# Patient Record
Sex: FEMALE | Race: WHITE | Employment: OTHER | URBAN - METROPOLITAN AREA
[De-identification: names, ages, dates, MRNs, and addresses within clinical notes are randomized per-mention and may not be internally consistent; named-entity substitution may affect disease eponyms.]

---

## 2017-07-17 PROBLEM — M16.4 POST-TRAUMATIC OSTEOARTHRITIS OF BOTH HIPS: Status: ACTIVE | Noted: 2017-07-17

## 2017-07-17 PROBLEM — M51.36 DDD (DEGENERATIVE DISC DISEASE), LUMBAR: Status: ACTIVE | Noted: 2017-07-17

## 2017-08-25 PROBLEM — Z87.81 HISTORY OF COMPRESSION FRACTURE OF SPINE: Status: ACTIVE | Noted: 2017-08-25

## 2018-05-14 ENCOUNTER — HOSPITAL ENCOUNTER (OUTPATIENT)
Age: 83
Discharge: HOME | End: 2018-05-16
Payer: MEDICARE

## 2018-05-14 LAB
ALBUMIN SERPL-MCNC: 3.7 G/DL (ref 3.5–5.2)
ALP BLD-CCNC: 50 U/L (ref 35–104)
ALT SERPL-CCNC: 13 U/L (ref 0–32)
ANION GAP SERPL CALCULATED.3IONS-SCNC: 15 MMOL/L (ref 7–16)
AST SERPL-CCNC: 18 U/L (ref 0–31)
BASOPHILS ABSOLUTE: 0.04 E9/L (ref 0–0.2)
BASOPHILS RELATIVE PERCENT: 0.8 % (ref 0–2)
BILIRUB SERPL-MCNC: 0.3 MG/DL (ref 0–1.2)
BUN BLDV-MCNC: 18 MG/DL (ref 8–23)
C-REACTIVE PROTEIN: <0.1 MG/DL (ref 0–0.4)
CALCIUM SERPL-MCNC: 9 MG/DL (ref 8.6–10.2)
CHLORIDE BLD-SCNC: 102 MMOL/L (ref 98–107)
CHOLESTEROL, TOTAL: 147 MG/DL (ref 0–199)
CO2: 25 MMOL/L (ref 22–29)
CREAT SERPL-MCNC: 1 MG/DL (ref 0.5–1)
EOSINOPHILS ABSOLUTE: 0.12 E9/L (ref 0.05–0.5)
EOSINOPHILS RELATIVE PERCENT: 2.3 % (ref 0–6)
GFR AFRICAN AMERICAN: >60
GFR NON-AFRICAN AMERICAN: 53 ML/MIN/1.73
GLUCOSE BLD-MCNC: 122 MG/DL (ref 74–109)
HCT VFR BLD CALC: 41.8 % (ref 34–48)
HDLC SERPL-MCNC: 72 MG/DL
HEMOGLOBIN: 13.2 G/DL (ref 11.5–15.5)
IMMATURE GRANULOCYTES #: 0.03 E9/L
IMMATURE GRANULOCYTES %: 0.6 % (ref 0–5)
LDL CHOLESTEROL CALCULATED: 57 MG/DL (ref 0–99)
LYMPHOCYTES ABSOLUTE: 1.12 E9/L (ref 1.5–4)
LYMPHOCYTES RELATIVE PERCENT: 21.3 % (ref 20–42)
MCH RBC QN AUTO: 29.3 PG (ref 26–35)
MCHC RBC AUTO-ENTMCNC: 31.6 % (ref 32–34.5)
MCV RBC AUTO: 92.9 FL (ref 80–99.9)
MONOCYTES ABSOLUTE: 0.57 E9/L (ref 0.1–0.95)
MONOCYTES RELATIVE PERCENT: 10.8 % (ref 2–12)
NEUTROPHILS ABSOLUTE: 3.39 E9/L (ref 1.8–7.3)
NEUTROPHILS RELATIVE PERCENT: 64.2 % (ref 43–80)
PDW BLD-RTO: 12.8 FL (ref 11.5–15)
PLATELET # BLD: 194 E9/L (ref 130–450)
PMV BLD AUTO: 11.4 FL (ref 7–12)
POTASSIUM SERPL-SCNC: 4.6 MMOL/L (ref 3.5–5)
RBC # BLD: 4.5 E12/L (ref 3.5–5.5)
SODIUM BLD-SCNC: 142 MMOL/L (ref 132–146)
TOTAL PROTEIN: 6.5 G/DL (ref 6.4–8.3)
TRIGL SERPL-MCNC: 89 MG/DL (ref 0–149)
VLDLC SERPL CALC-MCNC: 18 MG/DL
WBC # BLD: 5.3 E9/L (ref 4.5–11.5)

## 2018-05-14 PROCEDURE — 80053 COMPREHEN METABOLIC PANEL: CPT

## 2018-05-14 PROCEDURE — 86140 C-REACTIVE PROTEIN: CPT

## 2018-05-14 PROCEDURE — 85651 RBC SED RATE NONAUTOMATED: CPT

## 2018-05-14 PROCEDURE — 80061 LIPID PANEL: CPT

## 2018-05-14 PROCEDURE — 85025 COMPLETE CBC W/AUTO DIFF WBC: CPT

## 2018-05-15 LAB — SEDIMENTATION RATE, ERYTHROCYTE: 6 MM/HR (ref 0–20)

## 2018-11-21 ENCOUNTER — HOSPITAL ENCOUNTER (OUTPATIENT)
Age: 83
Discharge: HOME | End: 2018-11-23
Payer: MEDICARE

## 2018-11-21 LAB
ALBUMIN SERPL-MCNC: 3.8 G/DL (ref 3.5–5.2)
ALP BLD-CCNC: 48 U/L (ref 35–104)
ALT SERPL-CCNC: 17 U/L (ref 0–32)
ANION GAP SERPL CALCULATED.3IONS-SCNC: 15 MMOL/L (ref 7–16)
AST SERPL-CCNC: 24 U/L (ref 0–31)
BASOPHILS ABSOLUTE: 0.06 E9/L (ref 0–0.2)
BASOPHILS RELATIVE PERCENT: 1.1 % (ref 0–2)
BILIRUB SERPL-MCNC: 0.4 MG/DL (ref 0–1.2)
BUN BLDV-MCNC: 17 MG/DL (ref 8–23)
CALCIUM SERPL-MCNC: 9.1 MG/DL (ref 8.6–10.2)
CHLORIDE BLD-SCNC: 102 MMOL/L (ref 98–107)
CO2: 24 MMOL/L (ref 22–29)
CREAT SERPL-MCNC: 0.8 MG/DL (ref 0.5–1)
EOSINOPHILS ABSOLUTE: 0.11 E9/L (ref 0.05–0.5)
EOSINOPHILS RELATIVE PERCENT: 2 % (ref 0–6)
FOLATE: >20 NG/ML (ref 4.8–24.2)
GFR AFRICAN AMERICAN: >60
GFR NON-AFRICAN AMERICAN: >60 ML/MIN/1.73
GLUCOSE BLD-MCNC: 81 MG/DL (ref 74–99)
HCT VFR BLD CALC: 40.8 % (ref 34–48)
HEMOGLOBIN: 12.8 G/DL (ref 11.5–15.5)
IMMATURE GRANULOCYTES #: 0.02 E9/L
IMMATURE GRANULOCYTES %: 0.4 % (ref 0–5)
LYMPHOCYTES ABSOLUTE: 0.85 E9/L (ref 1.5–4)
LYMPHOCYTES RELATIVE PERCENT: 15.6 % (ref 20–42)
MCH RBC QN AUTO: 29.3 PG (ref 26–35)
MCHC RBC AUTO-ENTMCNC: 31.4 % (ref 32–34.5)
MCV RBC AUTO: 93.4 FL (ref 80–99.9)
MONOCYTES ABSOLUTE: 0.73 E9/L (ref 0.1–0.95)
MONOCYTES RELATIVE PERCENT: 13.4 % (ref 2–12)
NEUTROPHILS ABSOLUTE: 3.67 E9/L (ref 1.8–7.3)
NEUTROPHILS RELATIVE PERCENT: 67.5 % (ref 43–80)
PDW BLD-RTO: 13.2 FL (ref 11.5–15)
PLATELET # BLD: 184 E9/L (ref 130–450)
PMV BLD AUTO: 11.4 FL (ref 7–12)
POTASSIUM REFLEX MAGNESIUM: 4.3 MMOL/L (ref 3.5–5)
RBC # BLD: 4.37 E12/L (ref 3.5–5.5)
SODIUM BLD-SCNC: 141 MMOL/L (ref 132–146)
T4 FREE: 1.08 NG/DL (ref 0.93–1.7)
TOTAL PROTEIN: 6.5 G/DL (ref 6.4–8.3)
TSH SERPL DL<=0.05 MIU/L-ACNC: 1.92 UIU/ML (ref 0.27–4.2)
VITAMIN B-12: 702 PG/ML (ref 211–946)
VITAMIN D 25-HYDROXY: 55 NG/ML (ref 30–100)
WBC # BLD: 5.4 E9/L (ref 4.5–11.5)

## 2018-11-21 PROCEDURE — 82746 ASSAY OF FOLIC ACID SERUM: CPT

## 2018-11-21 PROCEDURE — 82306 VITAMIN D 25 HYDROXY: CPT

## 2018-11-21 PROCEDURE — 84439 ASSAY OF FREE THYROXINE: CPT

## 2018-11-21 PROCEDURE — 85025 COMPLETE CBC W/AUTO DIFF WBC: CPT

## 2018-11-21 PROCEDURE — 84443 ASSAY THYROID STIM HORMONE: CPT

## 2018-11-21 PROCEDURE — 82607 VITAMIN B-12: CPT

## 2018-11-21 PROCEDURE — 80053 COMPREHEN METABOLIC PANEL: CPT

## 2019-02-19 ENCOUNTER — HOSPITAL ENCOUNTER (OUTPATIENT)
Age: 84
Discharge: HOME | End: 2019-02-21
Payer: MEDICARE

## 2019-02-19 LAB
ALBUMIN SERPL-MCNC: 4 G/DL (ref 3.5–5.2)
ALP BLD-CCNC: 55 U/L (ref 35–104)
ALT SERPL-CCNC: 16 U/L (ref 0–32)
ANION GAP SERPL CALCULATED.3IONS-SCNC: 10 MMOL/L (ref 7–16)
AST SERPL-CCNC: 22 U/L (ref 0–31)
BASOPHILS ABSOLUTE: 0.04 E9/L (ref 0–0.2)
BASOPHILS RELATIVE PERCENT: 0.6 % (ref 0–2)
BILIRUB SERPL-MCNC: 0.3 MG/DL (ref 0–1.2)
BUN BLDV-MCNC: 18 MG/DL (ref 8–23)
C-REACTIVE PROTEIN: <0.1 MG/DL (ref 0–0.4)
CALCIUM SERPL-MCNC: 9.1 MG/DL (ref 8.6–10.2)
CHLORIDE BLD-SCNC: 100 MMOL/L (ref 98–107)
CHOLESTEROL, TOTAL: 172 MG/DL (ref 0–199)
CO2: 30 MMOL/L (ref 22–29)
CREAT SERPL-MCNC: 0.9 MG/DL (ref 0.5–1)
CREATININE URINE: 82 MG/DL (ref 29–226)
EOSINOPHILS ABSOLUTE: 0.06 E9/L (ref 0.05–0.5)
EOSINOPHILS RELATIVE PERCENT: 1 % (ref 0–6)
GFR AFRICAN AMERICAN: >60
GFR NON-AFRICAN AMERICAN: 59 ML/MIN/1.73
GLUCOSE BLD-MCNC: 83 MG/DL (ref 74–99)
HCT VFR BLD CALC: 42.5 % (ref 34–48)
HCT VFR BLD CALC: 42.7 % (ref 34–48)
HDLC SERPL-MCNC: 89 MG/DL
HEMOGLOBIN: 13 G/DL (ref 11.5–15.5)
IMMATURE GRANULOCYTES #: 0.06 E9/L
IMMATURE GRANULOCYTES %: 1 % (ref 0–5)
IMMATURE RETIC FRACT: 7.4 % (ref 3–15.9)
IRON SATURATION: 22 % (ref 15–50)
IRON: 74 MCG/DL (ref 37–145)
LDL CHOLESTEROL CALCULATED: 68 MG/DL (ref 0–99)
LYMPHOCYTES ABSOLUTE: 0.86 E9/L (ref 1.5–4)
LYMPHOCYTES RELATIVE PERCENT: 13.8 % (ref 20–42)
MAGNESIUM: 2.2 MG/DL (ref 1.6–2.6)
MCH RBC QN AUTO: 29.2 PG (ref 26–35)
MCHC RBC AUTO-ENTMCNC: 30.4 % (ref 32–34.5)
MCV RBC AUTO: 96 FL (ref 80–99.9)
MICROALBUMIN UR-MCNC: <12 MG/L
MICROALBUMIN/CREAT UR-RTO: ABNORMAL (ref 0–30)
MONOCYTES ABSOLUTE: 0.7 E9/L (ref 0.1–0.95)
MONOCYTES RELATIVE PERCENT: 11.2 % (ref 2–12)
NEUTROPHILS ABSOLUTE: 4.51 E9/L (ref 1.8–7.3)
NEUTROPHILS RELATIVE PERCENT: 72.4 % (ref 43–80)
PARATHYROID HORMONE INTACT: 29 PG/ML (ref 15–65)
PDW BLD-RTO: 13.7 FL (ref 11.5–15)
PHOSPHORUS: 4.4 MG/DL (ref 2.5–4.5)
PLATELET # BLD: 213 E9/L (ref 130–450)
PMV BLD AUTO: 10.8 FL (ref 7–12)
POTASSIUM SERPL-SCNC: 4.6 MMOL/L (ref 3.5–5)
RBC # BLD: 4.45 E12/L (ref 3.5–5.5)
RETIC HGB EQUIVALENT: 33.8 PG (ref 28.2–36.6)
RETICULOCYTE ABSOLUTE COUNT: 0.07 E12/L
RETICULOCYTE COUNT PCT: 1.5 % (ref 0.4–1.9)
SODIUM BLD-SCNC: 140 MMOL/L (ref 132–146)
TOTAL IRON BINDING CAPACITY: 331 MCG/DL (ref 250–450)
TOTAL PROTEIN: 6.6 G/DL (ref 6.4–8.3)
TRIGL SERPL-MCNC: 77 MG/DL (ref 0–149)
TSH SERPL DL<=0.05 MIU/L-ACNC: 2.06 UIU/ML (ref 0.27–4.2)
URIC ACID, SERUM: 3.5 MG/DL (ref 2.4–5.7)
VITAMIN D 25-HYDROXY: 57 NG/ML (ref 30–100)
VLDLC SERPL CALC-MCNC: 15 MG/DL
WBC # BLD: 6.2 E9/L (ref 4.5–11.5)

## 2019-02-19 PROCEDURE — 82570 ASSAY OF URINE CREATININE: CPT

## 2019-02-19 PROCEDURE — 83550 IRON BINDING TEST: CPT

## 2019-02-19 PROCEDURE — 82044 UR ALBUMIN SEMIQUANTITATIVE: CPT

## 2019-02-19 PROCEDURE — 85651 RBC SED RATE NONAUTOMATED: CPT

## 2019-02-19 PROCEDURE — 83540 ASSAY OF IRON: CPT

## 2019-02-19 PROCEDURE — 84443 ASSAY THYROID STIM HORMONE: CPT

## 2019-02-19 PROCEDURE — 84100 ASSAY OF PHOSPHORUS: CPT

## 2019-02-19 PROCEDURE — 82306 VITAMIN D 25 HYDROXY: CPT

## 2019-02-19 PROCEDURE — 80053 COMPREHEN METABOLIC PANEL: CPT

## 2019-02-19 PROCEDURE — 80061 LIPID PANEL: CPT

## 2019-02-19 PROCEDURE — 83735 ASSAY OF MAGNESIUM: CPT

## 2019-02-19 PROCEDURE — 84550 ASSAY OF BLOOD/URIC ACID: CPT

## 2019-02-19 PROCEDURE — 86140 C-REACTIVE PROTEIN: CPT

## 2019-02-19 PROCEDURE — 85045 AUTOMATED RETICULOCYTE COUNT: CPT

## 2019-02-19 PROCEDURE — 83970 ASSAY OF PARATHORMONE: CPT

## 2019-02-19 PROCEDURE — 85025 COMPLETE CBC W/AUTO DIFF WBC: CPT

## 2019-02-20 LAB — SEDIMENTATION RATE, ERYTHROCYTE: 6 MM/HR (ref 0–20)

## 2019-03-07 DIAGNOSIS — M85.80 DECREASED BONE DENSITY: ICD-10-CM

## 2019-03-07 RX ORDER — ZOLEDRONIC ACID 5 MG/100ML
5 INJECTION, SOLUTION INTRAVENOUS ONCE
Status: CANCELLED | OUTPATIENT
Start: 2019-04-15 | End: 2019-04-15

## 2019-03-07 RX ORDER — HEPARIN SODIUM (PORCINE) LOCK FLUSH IV SOLN 100 UNIT/ML 100 UNIT/ML
500 SOLUTION INTRAVENOUS PRN
Status: CANCELLED | OUTPATIENT
Start: 2019-04-15

## 2019-03-07 RX ORDER — SODIUM CHLORIDE 0.9 % (FLUSH) 0.9 %
10 SYRINGE (ML) INJECTION PRN
Status: CANCELLED | OUTPATIENT
Start: 2019-04-15

## 2019-03-26 ENCOUNTER — HOSPITAL ENCOUNTER (OUTPATIENT)
Dept: INFUSION THERAPY | Age: 84
Setting detail: INFUSION SERIES
Discharge: HOME | End: 2019-03-26
Payer: MEDICARE

## 2019-03-26 VITALS
RESPIRATION RATE: 18 BRPM | HEART RATE: 75 BPM | DIASTOLIC BLOOD PRESSURE: 68 MMHG | OXYGEN SATURATION: 96 % | TEMPERATURE: 98 F | SYSTOLIC BLOOD PRESSURE: 133 MMHG

## 2019-03-26 DIAGNOSIS — M85.80 DECREASED BONE DENSITY: Primary | ICD-10-CM

## 2019-03-26 PROCEDURE — 96365 THER/PROPH/DIAG IV INF INIT: CPT

## 2019-03-26 PROCEDURE — 6360000002 HC RX W HCPCS: Performed by: INTERNAL MEDICINE

## 2019-03-26 PROCEDURE — 2580000003 HC RX 258: Performed by: INTERNAL MEDICINE

## 2019-03-26 RX ORDER — CAPSAICIN 0.025 %
CREAM (GRAM) TOPICAL 2 TIMES DAILY
COMMUNITY
End: 2021-01-01

## 2019-03-26 RX ORDER — ZOLEDRONIC ACID 5 MG/100ML
5 INJECTION, SOLUTION INTRAVENOUS ONCE
Status: CANCELLED | OUTPATIENT
Start: 2019-03-26 | End: 2019-03-26

## 2019-03-26 RX ORDER — IPRATROPIUM BROMIDE 21 UG/1
2 SPRAY, METERED NASAL EVERY 12 HOURS
COMMUNITY
End: 2021-01-01

## 2019-03-26 RX ORDER — SODIUM CHLORIDE 0.9 % (FLUSH) 0.9 %
10 SYRINGE (ML) INJECTION PRN
Status: CANCELLED | OUTPATIENT
Start: 2019-03-26

## 2019-03-26 RX ORDER — BENAZEPRIL HYDROCHLORIDE AND HYDROCHLOROTHIAZIDE 5; 6.25 MG/1; MG/1
1 TABLET ORAL DAILY
COMMUNITY
End: 2021-01-01

## 2019-03-26 RX ORDER — HEPARIN SODIUM (PORCINE) LOCK FLUSH IV SOLN 100 UNIT/ML 100 UNIT/ML
500 SOLUTION INTRAVENOUS PRN
Status: CANCELLED | OUTPATIENT
Start: 2019-03-26

## 2019-03-26 RX ORDER — SODIUM CHLORIDE 0.9 % (FLUSH) 0.9 %
10 SYRINGE (ML) INJECTION PRN
Status: DISCONTINUED | OUTPATIENT
Start: 2019-03-26 | End: 2019-03-27 | Stop reason: HOSPADM

## 2019-03-26 RX ORDER — ZOLEDRONIC ACID 5 MG/100ML
5 INJECTION, SOLUTION INTRAVENOUS ONCE
Status: COMPLETED | OUTPATIENT
Start: 2019-03-26 | End: 2019-03-26

## 2019-03-26 RX ORDER — BUDESONIDE AND FORMOTEROL FUMARATE DIHYDRATE 160; 4.5 UG/1; UG/1
2 AEROSOL RESPIRATORY (INHALATION) 2 TIMES DAILY
COMMUNITY
End: 2021-01-01

## 2019-03-26 RX ADMIN — Medication 10 ML: at 13:52

## 2019-03-26 RX ADMIN — ZOLEDRONIC ACID 5 MG: 0.05 INJECTION, SOLUTION INTRAVENOUS at 13:31

## 2019-03-26 RX ADMIN — Medication 10 ML: at 13:31

## 2019-05-14 ENCOUNTER — HOSPITAL ENCOUNTER (OUTPATIENT)
Age: 84
Discharge: HOME | End: 2019-05-16
Payer: MEDICARE

## 2019-05-14 LAB
ALBUMIN SERPL-MCNC: 4 G/DL (ref 3.5–5.2)
ALP BLD-CCNC: 47 U/L (ref 35–104)
ALT SERPL-CCNC: 14 U/L (ref 0–32)
ANION GAP SERPL CALCULATED.3IONS-SCNC: 15 MMOL/L (ref 7–16)
AST SERPL-CCNC: 25 U/L (ref 0–31)
BASOPHILS ABSOLUTE: 0 E9/L (ref 0–0.2)
BASOPHILS RELATIVE PERCENT: 0.6 % (ref 0–2)
BILIRUB SERPL-MCNC: 0.4 MG/DL (ref 0–1.2)
BUN BLDV-MCNC: 17 MG/DL (ref 8–23)
BURR CELLS: ABNORMAL
CALCIUM SERPL-MCNC: 9.3 MG/DL (ref 8.6–10.2)
CHLORIDE BLD-SCNC: 103 MMOL/L (ref 98–107)
CO2: 25 MMOL/L (ref 22–29)
CREAT SERPL-MCNC: 0.8 MG/DL (ref 0.5–1)
EOSINOPHILS ABSOLUTE: 0.11 E9/L (ref 0.05–0.5)
EOSINOPHILS RELATIVE PERCENT: 1.7 % (ref 0–6)
GFR AFRICAN AMERICAN: >60
GFR NON-AFRICAN AMERICAN: >60 ML/MIN/1.73
GLUCOSE BLD-MCNC: 103 MG/DL (ref 74–99)
HCT VFR BLD CALC: 40.5 % (ref 34–48)
HEMOGLOBIN: 12.8 G/DL (ref 11.5–15.5)
LYMPHOCYTES ABSOLUTE: 0.4 E9/L (ref 1.5–4)
LYMPHOCYTES RELATIVE PERCENT: 6.1 % (ref 20–42)
MCH RBC QN AUTO: 29.3 PG (ref 26–35)
MCHC RBC AUTO-ENTMCNC: 31.6 % (ref 32–34.5)
MCV RBC AUTO: 92.7 FL (ref 80–99.9)
MONOCYTES ABSOLUTE: 0.79 E9/L (ref 0.1–0.95)
MONOCYTES RELATIVE PERCENT: 12.2 % (ref 2–12)
NEUTROPHILS ABSOLUTE: 5.28 E9/L (ref 1.8–7.3)
NEUTROPHILS RELATIVE PERCENT: 80 % (ref 43–80)
OVALOCYTES: ABNORMAL
PDW BLD-RTO: 13.4 FL (ref 11.5–15)
PLATELET # BLD: 184 E9/L (ref 130–450)
PMV BLD AUTO: 11.7 FL (ref 7–12)
POIKILOCYTES: ABNORMAL
POTASSIUM SERPL-SCNC: 4.5 MMOL/L (ref 3.5–5)
RBC # BLD: 4.37 E12/L (ref 3.5–5.5)
SODIUM BLD-SCNC: 143 MMOL/L (ref 132–146)
TOTAL PROTEIN: 6.7 G/DL (ref 6.4–8.3)
WBC # BLD: 6.6 E9/L (ref 4.5–11.5)

## 2019-05-14 PROCEDURE — 80053 COMPREHEN METABOLIC PANEL: CPT

## 2019-05-14 PROCEDURE — 85025 COMPLETE CBC W/AUTO DIFF WBC: CPT

## 2019-08-13 ENCOUNTER — HOSPITAL ENCOUNTER (OUTPATIENT)
Dept: GENERAL RADIOLOGY | Age: 84
Discharge: HOME | End: 2019-08-15
Payer: MEDICARE

## 2019-08-13 ENCOUNTER — HOSPITAL ENCOUNTER (OUTPATIENT)
Age: 84
Discharge: HOME | End: 2019-08-15
Payer: MEDICARE
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
## 2019-08-13 PROCEDURE — 72100 X-RAY EXAM L-S SPINE 2/3 VWS: CPT

## 2019-08-13 PROCEDURE — 72220 X-RAY EXAM SACRUM TAILBONE: CPT

## 2020-01-01 ENCOUNTER — HOSPITAL ENCOUNTER (OUTPATIENT)
Age: 85
Discharge: HOME | End: 2020-09-10
Payer: MEDICARE

## 2020-01-01 ENCOUNTER — TELEPHONE (OUTPATIENT)
Dept: FAMILY MEDICINE CLINIC | Age: 85
End: 2020-01-01

## 2020-01-01 LAB
ALBUMIN SERPL-MCNC: 3.6 G/DL (ref 3.5–5.2)
ALBUMIN SERPL-MCNC: 3.8 G/DL (ref 3.5–5.2)
ALP BLD-CCNC: 49 U/L (ref 35–104)
ALP BLD-CCNC: 51 U/L (ref 35–104)
ALT SERPL-CCNC: 12 U/L (ref 0–32)
ALT SERPL-CCNC: 13 U/L (ref 0–32)
ANION GAP SERPL CALCULATED.3IONS-SCNC: 11 MMOL/L (ref 7–16)
ANION GAP SERPL CALCULATED.3IONS-SCNC: 13 MMOL/L (ref 7–16)
AST SERPL-CCNC: 21 U/L (ref 0–31)
AST SERPL-CCNC: 22 U/L (ref 0–31)
BASOPHILS ABSOLUTE: 0.05 E9/L (ref 0–0.2)
BASOPHILS ABSOLUTE: 0.07 E9/L (ref 0–0.2)
BASOPHILS RELATIVE PERCENT: 0.7 % (ref 0–2)
BASOPHILS RELATIVE PERCENT: 1.2 % (ref 0–2)
BILIRUB SERPL-MCNC: 0.3 MG/DL (ref 0–1.2)
BILIRUB SERPL-MCNC: 0.5 MG/DL (ref 0–1.2)
BILIRUBIN URINE: NEGATIVE
BLOOD, URINE: NEGATIVE
BUN BLDV-MCNC: 19 MG/DL (ref 8–23)
BUN BLDV-MCNC: 19 MG/DL (ref 8–23)
C-REACTIVE PROTEIN: <0.1 MG/DL (ref 0–0.4)
CALCIUM SERPL-MCNC: 9.4 MG/DL (ref 8.6–10.2)
CALCIUM SERPL-MCNC: 9.4 MG/DL (ref 8.6–10.2)
CHLORIDE BLD-SCNC: 103 MMOL/L (ref 98–107)
CHLORIDE BLD-SCNC: 98 MMOL/L (ref 98–107)
CHOLESTEROL, TOTAL: 146 MG/DL (ref 0–199)
CHOLESTEROL, TOTAL: 153 MG/DL (ref 0–199)
CLARITY: CLEAR
CO2: 26 MMOL/L (ref 22–29)
CO2: 28 MMOL/L (ref 22–29)
COLOR: YELLOW
CREAT SERPL-MCNC: 0.8 MG/DL (ref 0.5–1)
CREAT SERPL-MCNC: 1 MG/DL (ref 0.5–1)
EOSINOPHILS ABSOLUTE: 0.1 E9/L (ref 0.05–0.5)
EOSINOPHILS ABSOLUTE: 0.22 E9/L (ref 0.05–0.5)
EOSINOPHILS RELATIVE PERCENT: 1.4 % (ref 0–6)
EOSINOPHILS RELATIVE PERCENT: 3.8 % (ref 0–6)
FOLATE: >20 NG/ML (ref 4.8–24.2)
GFR AFRICAN AMERICAN: >60
GFR AFRICAN AMERICAN: >60
GFR NON-AFRICAN AMERICAN: 52 ML/MIN/1.73
GFR NON-AFRICAN AMERICAN: >60 ML/MIN/1.73
GLUCOSE BLD-MCNC: 109 MG/DL (ref 74–99)
GLUCOSE BLD-MCNC: 88 MG/DL (ref 74–99)
GLUCOSE URINE: NEGATIVE MG/DL
HCT VFR BLD CALC: 39.4 % (ref 34–48)
HCT VFR BLD CALC: 39.7 % (ref 34–48)
HDLC SERPL-MCNC: 89 MG/DL
HDLC SERPL-MCNC: 93 MG/DL
HEMOGLOBIN: 12.1 G/DL (ref 11.5–15.5)
HEMOGLOBIN: 12.4 G/DL (ref 11.5–15.5)
IMMATURE GRANULOCYTES #: 0.02 E9/L
IMMATURE GRANULOCYTES #: 0.04 E9/L
IMMATURE GRANULOCYTES %: 0.3 % (ref 0–5)
IMMATURE GRANULOCYTES %: 0.6 % (ref 0–5)
KETONES, URINE: NEGATIVE MG/DL
LDL CHOLESTEROL CALCULATED: 46 MG/DL (ref 0–99)
LDL CHOLESTEROL CALCULATED: 47 MG/DL (ref 0–99)
LEUKOCYTE ESTERASE, URINE: NEGATIVE
LYMPHOCYTES ABSOLUTE: 0.61 E9/L (ref 1.5–4)
LYMPHOCYTES ABSOLUTE: 1.35 E9/L (ref 1.5–4)
LYMPHOCYTES RELATIVE PERCENT: 23.6 % (ref 20–42)
LYMPHOCYTES RELATIVE PERCENT: 8.8 % (ref 20–42)
MCH RBC QN AUTO: 27.2 PG (ref 26–35)
MCH RBC QN AUTO: 27.7 PG (ref 26–35)
MCHC RBC AUTO-ENTMCNC: 30.7 % (ref 32–34.5)
MCHC RBC AUTO-ENTMCNC: 31.2 % (ref 32–34.5)
MCV RBC AUTO: 87.1 FL (ref 80–99.9)
MCV RBC AUTO: 90.2 FL (ref 80–99.9)
MONOCYTES ABSOLUTE: 0.78 E9/L (ref 0.1–0.95)
MONOCYTES ABSOLUTE: 0.84 E9/L (ref 0.1–0.95)
MONOCYTES RELATIVE PERCENT: 11.3 % (ref 2–12)
MONOCYTES RELATIVE PERCENT: 14.7 % (ref 2–12)
NEUTROPHILS ABSOLUTE: 3.22 E9/L (ref 1.8–7.3)
NEUTROPHILS ABSOLUTE: 5.35 E9/L (ref 1.8–7.3)
NEUTROPHILS RELATIVE PERCENT: 56.4 % (ref 43–80)
NEUTROPHILS RELATIVE PERCENT: 77.2 % (ref 43–80)
NITRITE, URINE: NEGATIVE
PDW BLD-RTO: 14.4 FL (ref 11.5–15)
PDW BLD-RTO: 14.7 FL (ref 11.5–15)
PH UA: 6 (ref 5–9)
PLATELET # BLD: 187 E9/L (ref 130–450)
PLATELET # BLD: 216 E9/L (ref 130–450)
PMV BLD AUTO: 11.2 FL (ref 7–12)
PMV BLD AUTO: 11.4 FL (ref 7–12)
POTASSIUM SERPL-SCNC: 4.2 MMOL/L (ref 3.5–5)
POTASSIUM SERPL-SCNC: 4.5 MMOL/L (ref 3.5–5)
PROTEIN UA: NEGATIVE MG/DL
RBC # BLD: 4.37 E12/L (ref 3.5–5.5)
RBC # BLD: 4.56 E12/L (ref 3.5–5.5)
SEDIMENTATION RATE, ERYTHROCYTE: 3 MM/HR (ref 0–20)
SODIUM BLD-SCNC: 137 MMOL/L (ref 132–146)
SODIUM BLD-SCNC: 142 MMOL/L (ref 132–146)
SPECIFIC GRAVITY UA: >=1.03 (ref 1–1.03)
TOTAL PROTEIN: 6.1 G/DL (ref 6.4–8.3)
TOTAL PROTEIN: 6.5 G/DL (ref 6.4–8.3)
TRIGL SERPL-MCNC: 56 MG/DL (ref 0–149)
TRIGL SERPL-MCNC: 64 MG/DL (ref 0–149)
TSH SERPL DL<=0.05 MIU/L-ACNC: 1.5 UIU/ML (ref 0.27–4.2)
TSH SERPL DL<=0.05 MIU/L-ACNC: 2.97 UIU/ML (ref 0.27–4.2)
URINE CULTURE, ROUTINE: NORMAL
UROBILINOGEN, URINE: 0.2 E.U./DL
VITAMIN B-12: 699 PG/ML (ref 211–946)
VITAMIN D 25-HYDROXY: 54 NG/ML (ref 30–100)
VLDLC SERPL CALC-MCNC: 11 MG/DL
VLDLC SERPL CALC-MCNC: 13 MG/DL
WBC # BLD: 5.7 E9/L (ref 4.5–11.5)
WBC # BLD: 6.9 E9/L (ref 4.5–11.5)

## 2020-01-01 PROCEDURE — 86140 C-REACTIVE PROTEIN: CPT

## 2020-01-01 PROCEDURE — 84443 ASSAY THYROID STIM HORMONE: CPT

## 2020-01-01 PROCEDURE — 80061 LIPID PANEL: CPT

## 2020-01-01 PROCEDURE — 85025 COMPLETE CBC W/AUTO DIFF WBC: CPT

## 2020-01-01 PROCEDURE — 80053 COMPREHEN METABOLIC PANEL: CPT

## 2020-01-01 PROCEDURE — 85651 RBC SED RATE NONAUTOMATED: CPT

## 2020-01-01 RX ORDER — ZINC OXIDE
OINTMENT (GRAM) TOPICAL
Qty: 1 TUBE | Refills: 2 | Status: SHIPPED
Start: 2020-01-01 | End: 2021-01-01

## 2020-01-01 RX ORDER — LORATADINE 10 MG/1
TABLET ORAL
Qty: 30 TABLET | Refills: 11 | COMMUNITY
Start: 2020-01-01 | End: 2020-01-01 | Stop reason: SDUPTHER

## 2020-01-01 RX ORDER — WHEAT DEXTRIN 3 G/3.8 G
POWDER (GRAM) ORAL
Qty: 1 CAN | Refills: 11 | Status: SHIPPED | OUTPATIENT
Start: 2020-01-01

## 2020-01-01 RX ORDER — CHOLECALCIFEROL (VITAMIN D3) 50 MCG
2000 TABLET ORAL DAILY
Qty: 30 TABLET | Refills: 11 | Status: SHIPPED | OUTPATIENT
Start: 2020-01-01

## 2020-01-01 RX ORDER — VALSARTAN 40 MG/1
40 TABLET ORAL DAILY
Qty: 30 TABLET | Refills: 11 | Status: SHIPPED
Start: 2020-01-01 | End: 2021-01-01 | Stop reason: SINTOL

## 2020-01-01 RX ORDER — ALBUTEROL SULFATE 90 UG/1
2 AEROSOL, METERED RESPIRATORY (INHALATION) 4 TIMES DAILY PRN
Qty: 1 INHALER | Refills: 5 | Status: SHIPPED
Start: 2020-01-01 | End: 2021-01-01

## 2020-01-01 RX ORDER — MV-MN/OM3/DHA/EPA/FISH/LUT/ZEA 250-5-1 MG
1 CAPSULE ORAL DAILY
Qty: 30 CAPSULE | Refills: 11 | Status: SHIPPED | OUTPATIENT
Start: 2020-01-01

## 2020-01-01 RX ORDER — PREDNISONE 5 MG/1
TABLET, DELAYED RELEASE ORAL
Qty: 30 TABLET | Refills: 5 | Status: SHIPPED
Start: 2020-01-01 | End: 2021-01-01

## 2020-01-01 RX ORDER — UREA 10 %
1 LOTION (ML) TOPICAL NIGHTLY PRN
Qty: 30 TABLET | Refills: 11 | Status: SHIPPED
Start: 2020-01-01 | End: 2021-01-01

## 2020-01-01 RX ORDER — FUROSEMIDE 20 MG/1
20 TABLET ORAL DAILY
Qty: 30 TABLET | Refills: 5 | Status: SHIPPED
Start: 2020-01-01 | End: 2021-01-01

## 2020-01-01 RX ORDER — LORATADINE 10 MG/1
TABLET ORAL
Qty: 30 TABLET | Refills: 11 | Status: SHIPPED | OUTPATIENT
Start: 2020-01-01

## 2020-01-01 RX ORDER — LANOLIN ALCOHOL/MO/W.PET/CERES
3 CREAM (GRAM) TOPICAL NIGHTLY PRN
Qty: 15 TABLET | Refills: 3 | Status: SHIPPED | OUTPATIENT
Start: 2020-01-01 | End: 2020-01-01

## 2020-01-01 RX ORDER — LEVOFLOXACIN 500 MG/1
500 TABLET, FILM COATED ORAL DAILY
Qty: 7 TABLET | Refills: 0 | Status: SHIPPED | OUTPATIENT
Start: 2020-01-01 | End: 2021-01-01

## 2020-01-01 RX ORDER — SENNOSIDES 8.6 MG
CAPSULE ORAL
Qty: 180 TABLET | Refills: 5 | Status: SHIPPED
Start: 2020-01-01 | End: 2021-01-01

## 2020-01-01 RX ORDER — PHENOL 1.4 %
1 AEROSOL, SPRAY (ML) MUCOUS MEMBRANE 2 TIMES DAILY
Qty: 30 TABLET | Refills: 11 | Status: SHIPPED | OUTPATIENT
Start: 2020-01-01

## 2020-02-10 ENCOUNTER — HOSPITAL ENCOUNTER (OUTPATIENT)
Age: 85
Discharge: HOME | End: 2020-02-12
Payer: MEDICARE

## 2020-02-10 LAB
ALBUMIN SERPL-MCNC: 4 G/DL (ref 3.5–5.2)
ALP BLD-CCNC: 48 U/L (ref 35–104)
ALT SERPL-CCNC: 16 U/L (ref 0–32)
ANION GAP SERPL CALCULATED.3IONS-SCNC: 15 MMOL/L (ref 7–16)
AST SERPL-CCNC: 24 U/L (ref 0–31)
BASOPHILS ABSOLUTE: 0.03 E9/L (ref 0–0.2)
BASOPHILS RELATIVE PERCENT: 0.4 % (ref 0–2)
BILIRUB SERPL-MCNC: 0.3 MG/DL (ref 0–1.2)
BUN BLDV-MCNC: 26 MG/DL (ref 8–23)
BURR CELLS: ABNORMAL
C-REACTIVE PROTEIN: 0.1 MG/DL (ref 0–0.4)
CALCIUM SERPL-MCNC: 9.4 MG/DL (ref 8.6–10.2)
CHLORIDE BLD-SCNC: 100 MMOL/L (ref 98–107)
CHOLESTEROL, TOTAL: 169 MG/DL (ref 0–199)
CO2: 24 MMOL/L (ref 22–29)
CREAT SERPL-MCNC: 1 MG/DL (ref 0.5–1)
EOSINOPHILS ABSOLUTE: 0.05 E9/L (ref 0.05–0.5)
EOSINOPHILS RELATIVE PERCENT: 0.7 % (ref 0–6)
GFR AFRICAN AMERICAN: >60
GFR NON-AFRICAN AMERICAN: 52 ML/MIN/1.73
GLUCOSE BLD-MCNC: 128 MG/DL (ref 74–99)
HCT VFR BLD CALC: 40.7 % (ref 34–48)
HDLC SERPL-MCNC: 86 MG/DL
HEMOGLOBIN: 12.1 G/DL (ref 11.5–15.5)
IMMATURE GRANULOCYTES #: 0.04 E9/L
IMMATURE GRANULOCYTES %: 0.6 % (ref 0–5)
LDL CHOLESTEROL CALCULATED: 67 MG/DL (ref 0–99)
LYMPHOCYTES ABSOLUTE: 0.52 E9/L (ref 1.5–4)
LYMPHOCYTES RELATIVE PERCENT: 7.5 % (ref 20–42)
MCH RBC QN AUTO: 27.1 PG (ref 26–35)
MCHC RBC AUTO-ENTMCNC: 29.7 % (ref 32–34.5)
MCV RBC AUTO: 91.3 FL (ref 80–99.9)
MONOCYTES ABSOLUTE: 0.6 E9/L (ref 0.1–0.95)
MONOCYTES RELATIVE PERCENT: 8.6 % (ref 2–12)
NEUTROPHILS ABSOLUTE: 5.73 E9/L (ref 1.8–7.3)
NEUTROPHILS RELATIVE PERCENT: 82.2 % (ref 43–80)
OVALOCYTES: ABNORMAL
PDW BLD-RTO: 15 FL (ref 11.5–15)
PLATELET # BLD: 177 E9/L (ref 130–450)
PMV BLD AUTO: 11.1 FL (ref 7–12)
POIKILOCYTES: ABNORMAL
POTASSIUM SERPL-SCNC: 4.3 MMOL/L (ref 3.5–5)
RBC # BLD: 4.46 E12/L (ref 3.5–5.5)
SEDIMENTATION RATE, ERYTHROCYTE: 11 MM/HR (ref 0–20)
SODIUM BLD-SCNC: 139 MMOL/L (ref 132–146)
TOTAL PROTEIN: 6.8 G/DL (ref 6.4–8.3)
TRIGL SERPL-MCNC: 79 MG/DL (ref 0–149)
VLDLC SERPL CALC-MCNC: 16 MG/DL
WBC # BLD: 7 E9/L (ref 4.5–11.5)

## 2020-02-10 PROCEDURE — 85025 COMPLETE CBC W/AUTO DIFF WBC: CPT

## 2020-02-10 PROCEDURE — 85651 RBC SED RATE NONAUTOMATED: CPT

## 2020-02-10 PROCEDURE — 80061 LIPID PANEL: CPT

## 2020-02-10 PROCEDURE — 80053 COMPREHEN METABOLIC PANEL: CPT

## 2020-02-10 PROCEDURE — 86140 C-REACTIVE PROTEIN: CPT

## 2020-06-24 ENCOUNTER — HOSPITAL ENCOUNTER (OUTPATIENT)
Age: 85
Discharge: HOME | End: 2020-06-26
Payer: MEDICARE

## 2020-06-24 LAB
ANION GAP SERPL CALCULATED.3IONS-SCNC: 14 MMOL/L (ref 7–16)
BACTERIA: ABNORMAL /HPF
BILIRUBIN URINE: NEGATIVE
BLOOD, URINE: NEGATIVE
BUN BLDV-MCNC: 18 MG/DL (ref 8–23)
CALCIUM SERPL-MCNC: 9.2 MG/DL (ref 8.6–10.2)
CHLORIDE BLD-SCNC: 100 MMOL/L (ref 98–107)
CLARITY: CLEAR
CO2: 27 MMOL/L (ref 22–29)
COLOR: YELLOW
CREAT SERPL-MCNC: 0.8 MG/DL (ref 0.5–1)
CREATININE URINE: 105 MG/DL (ref 29–226)
FERRITIN: 21 NG/ML
GFR AFRICAN AMERICAN: >60
GFR NON-AFRICAN AMERICAN: >60 ML/MIN/1.73
GLUCOSE BLD-MCNC: 97 MG/DL (ref 74–99)
GLUCOSE URINE: NEGATIVE MG/DL
HCT VFR BLD CALC: 41.8 % (ref 34–48)
HEMOGLOBIN: 12.5 G/DL (ref 11.5–15.5)
IMMATURE RETIC FRACT: 11.7 % (ref 3–15.9)
IRON SATURATION: 20 % (ref 15–50)
IRON: 73 MCG/DL (ref 37–145)
KETONES, URINE: NEGATIVE MG/DL
LEUKOCYTE ESTERASE, URINE: NEGATIVE
MAGNESIUM: 2.2 MG/DL (ref 1.6–2.6)
MCH RBC QN AUTO: 27.4 PG (ref 26–35)
MCHC RBC AUTO-ENTMCNC: 29.9 % (ref 32–34.5)
MCV RBC AUTO: 91.7 FL (ref 80–99.9)
MICROALBUMIN UR-MCNC: 24 MG/L
MICROALBUMIN/CREAT UR-RTO: 22.9 (ref 0–30)
NITRITE, URINE: NEGATIVE
PARATHYROID HORMONE INTACT: 36 PG/ML (ref 15–65)
PDW BLD-RTO: 14.6 FL (ref 11.5–15)
PH UA: 6 (ref 5–9)
PHOSPHORUS: 4 MG/DL (ref 2.5–4.5)
PLATELET # BLD: 195 E9/L (ref 130–450)
PMV BLD AUTO: 11.4 FL (ref 7–12)
POTASSIUM SERPL-SCNC: 4.4 MMOL/L (ref 3.5–5)
PROTEIN UA: NEGATIVE MG/DL
RBC # BLD: 4.56 E12/L (ref 3.5–5.5)
RBC UA: ABNORMAL /HPF (ref 0–2)
RETIC HGB EQUIVALENT: 32.1 PG (ref 28.2–36.6)
RETICULOCYTE ABSOLUTE COUNT: 0.05 E12/L
RETICULOCYTE COUNT PCT: 1.2 % (ref 0.4–1.9)
SODIUM BLD-SCNC: 141 MMOL/L (ref 132–146)
SPECIFIC GRAVITY UA: 1.01 (ref 1–1.03)
TOTAL IRON BINDING CAPACITY: 372 MCG/DL (ref 250–450)
URIC ACID, SERUM: 3.2 MG/DL (ref 2.4–5.7)
UROBILINOGEN, URINE: 0.2 E.U./DL
VITAMIN D 25-HYDROXY: 57 NG/ML (ref 30–100)
WBC # BLD: 7.3 E9/L (ref 4.5–11.5)
WBC UA: ABNORMAL /HPF (ref 0–5)

## 2020-06-24 PROCEDURE — 82728 ASSAY OF FERRITIN: CPT

## 2020-06-24 PROCEDURE — 82570 ASSAY OF URINE CREATININE: CPT

## 2020-06-24 PROCEDURE — 80048 BASIC METABOLIC PNL TOTAL CA: CPT

## 2020-06-24 PROCEDURE — 85027 COMPLETE CBC AUTOMATED: CPT

## 2020-06-24 PROCEDURE — 84550 ASSAY OF BLOOD/URIC ACID: CPT

## 2020-06-24 PROCEDURE — 83550 IRON BINDING TEST: CPT

## 2020-06-24 PROCEDURE — 84100 ASSAY OF PHOSPHORUS: CPT

## 2020-06-24 PROCEDURE — 82044 UR ALBUMIN SEMIQUANTITATIVE: CPT

## 2020-06-24 PROCEDURE — 81001 URINALYSIS AUTO W/SCOPE: CPT

## 2020-06-24 PROCEDURE — 85045 AUTOMATED RETICULOCYTE COUNT: CPT

## 2020-06-24 PROCEDURE — 83735 ASSAY OF MAGNESIUM: CPT

## 2020-06-24 PROCEDURE — 83970 ASSAY OF PARATHORMONE: CPT

## 2020-06-24 PROCEDURE — 82306 VITAMIN D 25 HYDROXY: CPT

## 2020-06-24 PROCEDURE — 83540 ASSAY OF IRON: CPT

## 2020-10-21 NOTE — TELEPHONE ENCOUNTER
Dinh Perkins from [address] [place] [address] would like to know if pt should be on the plain Butt paste or the compounded butt paste pt was previously on the compound

## 2021-01-01 ENCOUNTER — TELEPHONE (OUTPATIENT)
Dept: CARDIOLOGY CLINIC | Age: 86
End: 2021-01-01

## 2021-01-01 ENCOUNTER — HOSPITAL ENCOUNTER (INPATIENT)
Age: 86
LOS: 6 days | Discharge: NURSING, REHAB OR LONG-TERM CARE | DRG: 291 | End: 2021-03-29
Attending: EMERGENCY MEDICINE | Admitting: FAMILY MEDICINE
Payer: MEDICARE

## 2021-01-01 ENCOUNTER — HOSPITAL ENCOUNTER (OUTPATIENT)
Dept: PREADMISSION TESTING | Age: 86
Discharge: HOME | End: 2021-05-13
Payer: MEDICARE

## 2021-01-01 ENCOUNTER — ANESTHESIA EVENT (OUTPATIENT)
Dept: OPERATING ROOM | Age: 86
DRG: 266 | End: 2021-01-01
Payer: MEDICARE

## 2021-01-01 ENCOUNTER — HOSPITAL ENCOUNTER (OUTPATIENT)
Dept: ULTRASOUND IMAGING | Age: 86
Discharge: HOME | End: 2021-05-15
Payer: MEDICARE

## 2021-01-01 ENCOUNTER — OFFICE VISIT (OUTPATIENT)
Dept: CARDIOLOGY CLINIC | Age: 86
End: 2021-01-01
Payer: MEDICARE

## 2021-01-01 ENCOUNTER — TELEPHONE (OUTPATIENT)
Dept: CARDIOLOGY | Age: 86
End: 2021-01-01

## 2021-01-01 ENCOUNTER — PREP FOR PROCEDURE (OUTPATIENT)
Dept: CARDIOLOGY | Age: 86
End: 2021-01-01

## 2021-01-01 ENCOUNTER — APPOINTMENT (OUTPATIENT)
Dept: ULTRASOUND IMAGING | Age: 86
DRG: 266 | End: 2021-01-01
Attending: INTERNAL MEDICINE
Payer: MEDICARE

## 2021-01-01 ENCOUNTER — HOSPITAL ENCOUNTER (OUTPATIENT)
Dept: GENERAL RADIOLOGY | Age: 86
Discharge: HOME | End: 2021-05-15
Payer: MEDICARE

## 2021-01-01 ENCOUNTER — HOSPITAL ENCOUNTER (OUTPATIENT)
Dept: CT IMAGING | Age: 86
Discharge: HOME | End: 2021-05-07
Payer: MEDICARE

## 2021-01-01 ENCOUNTER — IMMUNIZATION (OUTPATIENT)
Dept: PRIMARY CARE CLINIC | Age: 86
End: 2021-01-01
Payer: MEDICARE

## 2021-01-01 ENCOUNTER — TELEPHONE (OUTPATIENT)
Dept: PULMONOLOGY | Age: 86
End: 2021-01-01

## 2021-01-01 ENCOUNTER — APPOINTMENT (OUTPATIENT)
Dept: GENERAL RADIOLOGY | Age: 86
DRG: 266 | End: 2021-01-01
Attending: INTERNAL MEDICINE
Payer: MEDICARE

## 2021-01-01 ENCOUNTER — HOSPITAL ENCOUNTER (OUTPATIENT)
Dept: PULMONOLOGY | Age: 86
Discharge: HOME | End: 2021-05-13
Payer: MEDICARE

## 2021-01-01 ENCOUNTER — APPOINTMENT (OUTPATIENT)
Dept: GENERAL RADIOLOGY | Age: 86
DRG: 291 | End: 2021-01-01
Payer: MEDICARE

## 2021-01-01 ENCOUNTER — HOSPITAL ENCOUNTER (INPATIENT)
Age: 86
LOS: 3 days | Discharge: NURSING, REHAB OR LONG-TERM CARE | DRG: 266 | End: 2021-05-22
Attending: INTERNAL MEDICINE | Admitting: INTERNAL MEDICINE
Payer: MEDICARE

## 2021-01-01 ENCOUNTER — ANESTHESIA (OUTPATIENT)
Dept: OPERATING ROOM | Age: 86
DRG: 266 | End: 2021-01-01
Payer: MEDICARE

## 2021-01-01 ENCOUNTER — TELEPHONE (OUTPATIENT)
Dept: FAMILY MEDICINE CLINIC | Age: 86
End: 2021-01-01

## 2021-01-01 ENCOUNTER — APPOINTMENT (OUTPATIENT)
Dept: CT IMAGING | Age: 86
DRG: 291 | End: 2021-01-01
Payer: MEDICARE

## 2021-01-01 ENCOUNTER — APPOINTMENT (OUTPATIENT)
Dept: ULTRASOUND IMAGING | Age: 86
DRG: 291 | End: 2021-01-01
Payer: MEDICARE

## 2021-01-01 ENCOUNTER — HOSPITAL ENCOUNTER (OUTPATIENT)
Dept: CARDIOLOGY | Age: 86
Discharge: HOME | End: 2021-06-22
Payer: MEDICARE

## 2021-01-01 VITALS
RESPIRATION RATE: 16 BRPM | OXYGEN SATURATION: 100 % | BODY MASS INDEX: 24.3 KG/M2 | HEART RATE: 96 BPM | DIASTOLIC BLOOD PRESSURE: 93 MMHG | TEMPERATURE: 96.8 F | WEIGHT: 128.7 LBS | HEIGHT: 61 IN | SYSTOLIC BLOOD PRESSURE: 119 MMHG

## 2021-01-01 VITALS — RESPIRATION RATE: 28 BRPM | OXYGEN SATURATION: 100 %

## 2021-01-01 VITALS
TEMPERATURE: 98.4 F | BODY MASS INDEX: 23.98 KG/M2 | HEIGHT: 61 IN | WEIGHT: 127 LBS | SYSTOLIC BLOOD PRESSURE: 130 MMHG | DIASTOLIC BLOOD PRESSURE: 60 MMHG | HEART RATE: 94 BPM

## 2021-01-01 VITALS
WEIGHT: 134.6 LBS | RESPIRATION RATE: 20 BRPM | DIASTOLIC BLOOD PRESSURE: 76 MMHG | HEIGHT: 61 IN | BODY MASS INDEX: 25.41 KG/M2 | HEART RATE: 111 BPM | SYSTOLIC BLOOD PRESSURE: 122 MMHG

## 2021-01-01 VITALS
WEIGHT: 133.2 LBS | HEART RATE: 79 BPM | DIASTOLIC BLOOD PRESSURE: 52 MMHG | SYSTOLIC BLOOD PRESSURE: 88 MMHG | HEIGHT: 60 IN | BODY MASS INDEX: 26.15 KG/M2 | RESPIRATION RATE: 14 BRPM

## 2021-01-01 VITALS
WEIGHT: 134 LBS | DIASTOLIC BLOOD PRESSURE: 56 MMHG | HEART RATE: 77 BPM | HEIGHT: 61 IN | TEMPERATURE: 97.7 F | BODY MASS INDEX: 25.3 KG/M2 | OXYGEN SATURATION: 98 % | RESPIRATION RATE: 18 BRPM | SYSTOLIC BLOOD PRESSURE: 123 MMHG

## 2021-01-01 VITALS
HEART RATE: 69 BPM | DIASTOLIC BLOOD PRESSURE: 64 MMHG | BODY MASS INDEX: 23.94 KG/M2 | HEIGHT: 61 IN | SYSTOLIC BLOOD PRESSURE: 118 MMHG | RESPIRATION RATE: 16 BRPM | WEIGHT: 126.8 LBS

## 2021-01-01 VITALS
HEART RATE: 90 BPM | HEIGHT: 61 IN | DIASTOLIC BLOOD PRESSURE: 66 MMHG | SYSTOLIC BLOOD PRESSURE: 107 MMHG | WEIGHT: 130 LBS | RESPIRATION RATE: 20 BRPM | BODY MASS INDEX: 24.55 KG/M2

## 2021-01-01 VITALS
SYSTOLIC BLOOD PRESSURE: 120 MMHG | TEMPERATURE: 97.7 F | HEART RATE: 60 BPM | DIASTOLIC BLOOD PRESSURE: 50 MMHG | WEIGHT: 124 LBS | HEIGHT: 61 IN | BODY MASS INDEX: 23.41 KG/M2

## 2021-01-01 VITALS
SYSTOLIC BLOOD PRESSURE: 122 MMHG | HEART RATE: 87 BPM | RESPIRATION RATE: 18 BRPM | OXYGEN SATURATION: 90 % | WEIGHT: 134 LBS | DIASTOLIC BLOOD PRESSURE: 70 MMHG | BODY MASS INDEX: 25.3 KG/M2 | HEIGHT: 61 IN

## 2021-01-01 VITALS
WEIGHT: 134 LBS | BODY MASS INDEX: 25.3 KG/M2 | RESPIRATION RATE: 20 BRPM | DIASTOLIC BLOOD PRESSURE: 74 MMHG | HEART RATE: 96 BPM | SYSTOLIC BLOOD PRESSURE: 106 MMHG | OXYGEN SATURATION: 97 % | HEIGHT: 61 IN | TEMPERATURE: 97.5 F

## 2021-01-01 DIAGNOSIS — Z95.2 S/P TAVR (TRANSCATHETER AORTIC VALVE REPLACEMENT): Primary | ICD-10-CM

## 2021-01-01 DIAGNOSIS — I50.9 ACUTE CONGESTIVE HEART FAILURE, UNSPECIFIED HEART FAILURE TYPE (HCC): ICD-10-CM

## 2021-01-01 DIAGNOSIS — I34.9 NON-RHEUMATIC MITRAL VALVE DISEASE: ICD-10-CM

## 2021-01-01 DIAGNOSIS — I35.0 NODULAR CALCIFIC AORTIC VALVE STENOSIS: Primary | ICD-10-CM

## 2021-01-01 DIAGNOSIS — I35.0 NODULAR CALCIFIC AORTIC VALVE STENOSIS: ICD-10-CM

## 2021-01-01 DIAGNOSIS — R06.00 DYSPNEA, UNSPECIFIED TYPE: ICD-10-CM

## 2021-01-01 DIAGNOSIS — I35.0 CRITICAL AORTIC VALVE STENOSIS: Primary | ICD-10-CM

## 2021-01-01 DIAGNOSIS — R01.1 MURMUR: ICD-10-CM

## 2021-01-01 DIAGNOSIS — R06.00 DYSPNEA, UNSPECIFIED TYPE: Primary | ICD-10-CM

## 2021-01-01 DIAGNOSIS — J96.02 ACUTE RESPIRATORY FAILURE WITH HYPOXIA AND HYPERCAPNIA (HCC): Primary | ICD-10-CM

## 2021-01-01 DIAGNOSIS — R09.89 SYMPTOMS INVOLVING CARDIOVASCULAR SYSTEM: Primary | ICD-10-CM

## 2021-01-01 DIAGNOSIS — I50.23 ACUTE ON CHRONIC SYSTOLIC CONGESTIVE HEART FAILURE (HCC): Primary | ICD-10-CM

## 2021-01-01 DIAGNOSIS — J96.01 ACUTE RESPIRATORY FAILURE WITH HYPOXIA AND HYPERCAPNIA (HCC): Primary | ICD-10-CM

## 2021-01-01 DIAGNOSIS — I10 HYPERTENSION, UNSPECIFIED TYPE: Primary | ICD-10-CM

## 2021-01-01 DIAGNOSIS — I26.93 SINGLE SUBSEGMENTAL PULMONARY EMBOLISM WITHOUT ACUTE COR PULMONALE (HCC): ICD-10-CM

## 2021-01-01 DIAGNOSIS — I50.23 ACUTE ON CHRONIC SYSTOLIC CONGESTIVE HEART FAILURE (HCC): ICD-10-CM

## 2021-01-01 DIAGNOSIS — I26.99 OTHER ACUTE PULMONARY EMBOLISM WITHOUT ACUTE COR PULMONALE (HCC): ICD-10-CM

## 2021-01-01 DIAGNOSIS — I27.20 MODERATE TO SEVERE PULMONARY HYPERTENSION (HCC): ICD-10-CM

## 2021-01-01 DIAGNOSIS — R09.89 SYMPTOMS INVOLVING CARDIOVASCULAR SYSTEM: ICD-10-CM

## 2021-01-01 DIAGNOSIS — I48.19 PERSISTENT ATRIAL FIBRILLATION (HCC): Primary | ICD-10-CM

## 2021-01-01 DIAGNOSIS — I10 HYPERTENSION, UNSPECIFIED TYPE: ICD-10-CM

## 2021-01-01 DIAGNOSIS — Z51.5 TERMINAL CARE: Primary | ICD-10-CM

## 2021-01-01 DIAGNOSIS — Z01.818 PRE-OP TESTING: Primary | ICD-10-CM

## 2021-01-01 LAB
ABO/RH: NORMAL
ADENOVIRUS BY PCR: NOT DETECTED
ALBUMIN SERPL-MCNC: 3.4 G/DL (ref 3.5–5.2)
ALBUMIN SERPL-MCNC: 3.4 G/DL (ref 3.5–5.2)
ALBUMIN SERPL-MCNC: 3.5 G/DL (ref 3.5–5.2)
ALBUMIN SERPL-MCNC: 3.6 G/DL (ref 3.5–5.2)
ALBUMIN SERPL-MCNC: 3.7 G/DL (ref 3.5–5.2)
ALBUMIN SERPL-MCNC: 4.1 G/DL (ref 3.5–5.2)
ALP BLD-CCNC: 53 U/L (ref 35–104)
ALP BLD-CCNC: 54 U/L (ref 35–104)
ALP BLD-CCNC: 55 U/L (ref 35–104)
ALP BLD-CCNC: 57 U/L (ref 35–104)
ALP BLD-CCNC: 58 U/L (ref 35–104)
ALP BLD-CCNC: 63 U/L (ref 35–104)
ALP BLD-CCNC: 63 U/L (ref 35–104)
ALP BLD-CCNC: 64 U/L (ref 35–104)
ALT SERPL-CCNC: 17 U/L (ref 0–32)
ALT SERPL-CCNC: 17 U/L (ref 0–32)
ALT SERPL-CCNC: 18 U/L (ref 0–32)
ALT SERPL-CCNC: 19 U/L (ref 0–32)
ALT SERPL-CCNC: 19 U/L (ref 0–32)
ALT SERPL-CCNC: 21 U/L (ref 0–32)
ALT SERPL-CCNC: 22 U/L (ref 0–32)
ALT SERPL-CCNC: 26 U/L (ref 0–32)
ANION GAP SERPL CALCULATED.3IONS-SCNC: 10 MMOL/L (ref 7–16)
ANION GAP SERPL CALCULATED.3IONS-SCNC: 11 MMOL/L (ref 7–16)
ANION GAP SERPL CALCULATED.3IONS-SCNC: 12 MMOL/L (ref 7–16)
ANION GAP SERPL CALCULATED.3IONS-SCNC: 2 MMOL/L (ref 7–16)
ANION GAP SERPL CALCULATED.3IONS-SCNC: 4 MMOL/L (ref 7–16)
ANION GAP SERPL CALCULATED.3IONS-SCNC: 5 MMOL/L (ref 7–16)
ANION GAP SERPL CALCULATED.3IONS-SCNC: 5 MMOL/L (ref 7–16)
ANION GAP SERPL CALCULATED.3IONS-SCNC: 6 MMOL/L (ref 7–16)
ANION GAP SERPL CALCULATED.3IONS-SCNC: 7 MMOL/L (ref 7–16)
ANION GAP SERPL CALCULATED.3IONS-SCNC: 8 MMOL/L (ref 7–16)
ANISOCYTOSIS: ABNORMAL
ANTIBODY SCREEN: NORMAL
APTT: 37 SEC (ref 24.5–35.1)
AST SERPL-CCNC: 18 U/L (ref 0–31)
AST SERPL-CCNC: 19 U/L (ref 0–31)
AST SERPL-CCNC: 19 U/L (ref 0–31)
AST SERPL-CCNC: 21 U/L (ref 0–31)
AST SERPL-CCNC: 22 U/L (ref 0–31)
AST SERPL-CCNC: 22 U/L (ref 0–31)
AST SERPL-CCNC: 24 U/L (ref 0–31)
AST SERPL-CCNC: 24 U/L (ref 0–31)
B.E.: 12.9 MMOL/L (ref -3–3)
B.E.: 6.9 MMOL/L (ref -3–3)
B.E.: 9.1 MMOL/L (ref -3–3)
BACTERIA: ABNORMAL /HPF
BASOPHILS ABSOLUTE: 0 E9/L (ref 0–0.2)
BASOPHILS ABSOLUTE: 0 E9/L (ref 0–0.2)
BASOPHILS ABSOLUTE: 0.04 E9/L (ref 0–0.2)
BASOPHILS ABSOLUTE: 0.05 E9/L (ref 0–0.2)
BASOPHILS ABSOLUTE: 0.06 E9/L (ref 0–0.2)
BASOPHILS RELATIVE PERCENT: 0.4 % (ref 0–2)
BASOPHILS RELATIVE PERCENT: 0.5 % (ref 0–2)
BASOPHILS RELATIVE PERCENT: 0.6 % (ref 0–2)
BASOPHILS RELATIVE PERCENT: 0.8 % (ref 0–2)
BASOPHILS RELATIVE PERCENT: 1 % (ref 0–2)
BILIRUB SERPL-MCNC: 0.4 MG/DL (ref 0–1.2)
BILIRUB SERPL-MCNC: 0.5 MG/DL (ref 0–1.2)
BILIRUB SERPL-MCNC: 0.5 MG/DL (ref 0–1.2)
BILIRUB SERPL-MCNC: 0.6 MG/DL (ref 0–1.2)
BILIRUBIN URINE: NEGATIVE
BILIRUBIN URINE: NEGATIVE
BLOOD BANK DISPENSE STATUS: NORMAL
BLOOD BANK PRODUCT CODE: NORMAL
BLOOD CULTURE, ROUTINE: NORMAL
BLOOD, URINE: ABNORMAL
BLOOD, URINE: NEGATIVE
BORDETELLA PARAPERTUSSIS BY PCR: NOT DETECTED
BORDETELLA PERTUSSIS BY PCR: NOT DETECTED
BPU ID: NORMAL
BUN BLDV-MCNC: 14 MG/DL (ref 8–23)
BUN BLDV-MCNC: 16 MG/DL (ref 6–23)
BUN BLDV-MCNC: 17 MG/DL (ref 8–23)
BUN BLDV-MCNC: 18 MG/DL (ref 8–23)
BUN BLDV-MCNC: 19 MG/DL (ref 6–23)
BUN BLDV-MCNC: 19 MG/DL (ref 8–23)
BUN BLDV-MCNC: 19 MG/DL (ref 8–23)
BUN BLDV-MCNC: 20 MG/DL (ref 6–23)
BUN BLDV-MCNC: 20 MG/DL (ref 8–23)
BUN BLDV-MCNC: 22 MG/DL (ref 8–23)
BUN BLDV-MCNC: 22 MG/DL (ref 8–23)
BUN BLDV-MCNC: 23 MG/DL (ref 8–23)
BUN BLDV-MCNC: 25 MG/DL (ref 8–23)
BUN BLDV-MCNC: 25 MG/DL (ref 8–23)
BUN BLDV-MCNC: 27 MG/DL (ref 8–23)
BUN BLDV-MCNC: 27 MG/DL (ref 8–23)
BUN BLDV-MCNC: 29 MG/DL (ref 8–23)
BURR CELLS: ABNORMAL
CALCIUM SERPL-MCNC: 10.5 MG/DL (ref 8.6–10.2)
CALCIUM SERPL-MCNC: 11.1 MG/DL (ref 8.6–10.2)
CALCIUM SERPL-MCNC: 8.3 MG/DL (ref 8.6–10.2)
CALCIUM SERPL-MCNC: 8.7 MG/DL (ref 8.6–10.2)
CALCIUM SERPL-MCNC: 8.8 MG/DL (ref 8.6–10.2)
CALCIUM SERPL-MCNC: 8.8 MG/DL (ref 8.6–10.2)
CALCIUM SERPL-MCNC: 8.9 MG/DL (ref 8.6–10.2)
CALCIUM SERPL-MCNC: 9 MG/DL (ref 8.6–10.2)
CALCIUM SERPL-MCNC: 9.1 MG/DL (ref 8.6–10.2)
CALCIUM SERPL-MCNC: 9.2 MG/DL (ref 8.6–10.2)
CALCIUM SERPL-MCNC: 9.3 MG/DL (ref 8.6–10.2)
CHLAMYDOPHILIA PNEUMONIAE BY PCR: NOT DETECTED
CHLORIDE BLD-SCNC: 100 MMOL/L (ref 98–107)
CHLORIDE BLD-SCNC: 102 MMOL/L (ref 98–107)
CHLORIDE BLD-SCNC: 87 MMOL/L (ref 98–107)
CHLORIDE BLD-SCNC: 90 MMOL/L (ref 98–107)
CHLORIDE BLD-SCNC: 91 MMOL/L (ref 98–107)
CHLORIDE BLD-SCNC: 92 MMOL/L (ref 98–107)
CHLORIDE BLD-SCNC: 93 MMOL/L (ref 98–107)
CHLORIDE BLD-SCNC: 93 MMOL/L (ref 98–107)
CHLORIDE BLD-SCNC: 94 MMOL/L (ref 98–107)
CHLORIDE BLD-SCNC: 95 MMOL/L (ref 98–107)
CHLORIDE BLD-SCNC: 96 MMOL/L (ref 98–107)
CHLORIDE BLD-SCNC: 97 MMOL/L (ref 98–107)
CHLORIDE BLD-SCNC: 98 MMOL/L (ref 98–107)
CHLORIDE BLD-SCNC: 98 MMOL/L (ref 98–107)
CHLORIDE BLD-SCNC: 99 MMOL/L (ref 98–107)
CLARITY: ABNORMAL
CLARITY: CLEAR
CO2: 30 MMOL/L (ref 22–29)
CO2: 33 MMOL/L (ref 22–29)
CO2: 34 MMOL/L (ref 22–29)
CO2: 35 MMOL/L (ref 22–29)
CO2: 36 MMOL/L (ref 22–29)
CO2: 37 MMOL/L (ref 22–29)
CO2: 38 MMOL/L (ref 22–29)
CO2: 39 MMOL/L (ref 22–29)
CO2: 40 MMOL/L (ref 22–29)
CO2: 40 MMOL/L (ref 22–29)
CO2: 41 MMOL/L (ref 22–29)
CO2: 42 MMOL/L (ref 22–29)
CO2: 44 MMOL/L (ref 22–29)
CO2: 46 MMOL/L (ref 22–29)
COHB: 1.8 % (ref 0–1.5)
COHB: 1.8 % (ref 0–1.5)
COHB: 1.9 % (ref 0–1.5)
COLOR: YELLOW
COLOR: YELLOW
CORONAVIRUS 229E BY PCR: NOT DETECTED
CORONAVIRUS HKU1 BY PCR: NOT DETECTED
CORONAVIRUS NL63 BY PCR: NOT DETECTED
CORONAVIRUS OC43 BY PCR: NOT DETECTED
CREAT SERPL-MCNC: 0.6 MG/DL (ref 0.5–1)
CREAT SERPL-MCNC: 0.7 MG/DL (ref 0.5–1)
CREAT SERPL-MCNC: 0.8 MG/DL (ref 0.5–1)
CRITICAL: ABNORMAL
CULTURE, BLOOD 2: NORMAL
CULTURE, RESPIRATORY: NORMAL
DATE ANALYZED: ABNORMAL
DATE OF COLLECTION: ABNORMAL
DESCRIPTION BLOOD BANK: NORMAL
DIGOXIN LEVEL: 1.5 NG/ML (ref 0.8–2)
DIGOXIN LEVEL: <0.3 NG/ML (ref 0.8–2)
EKG ATRIAL RATE: 441 BPM
EKG ATRIAL RATE: 72 BPM
EKG ATRIAL RATE: 96 BPM
EKG Q-T INTERVAL: 314 MS
EKG Q-T INTERVAL: 336 MS
EKG Q-T INTERVAL: 340 MS
EKG QRS DURATION: 102 MS
EKG QRS DURATION: 92 MS
EKG QRS DURATION: 96 MS
EKG QTC CALCULATION (BAZETT): 397 MS
EKG QTC CALCULATION (BAZETT): 412 MS
EKG QTC CALCULATION (BAZETT): 437 MS
EKG R AXIS: 35 DEGREES
EKG R AXIS: 53 DEGREES
EKG R AXIS: 62 DEGREES
EKG T AXIS: -102 DEGREES
EKG T AXIS: -117 DEGREES
EKG T AXIS: 118 DEGREES
EKG VENTRICULAR RATE: 102 BPM
EKG VENTRICULAR RATE: 104 BPM
EKG VENTRICULAR RATE: 82 BPM
EOSINOPHILS ABSOLUTE: 0.07 E9/L (ref 0.05–0.5)
EOSINOPHILS ABSOLUTE: 0.11 E9/L (ref 0.05–0.5)
EOSINOPHILS ABSOLUTE: 0.15 E9/L (ref 0.05–0.5)
EOSINOPHILS ABSOLUTE: 0.16 E9/L (ref 0.05–0.5)
EOSINOPHILS ABSOLUTE: 0.17 E9/L (ref 0.05–0.5)
EOSINOPHILS ABSOLUTE: 0.18 E9/L (ref 0.05–0.5)
EOSINOPHILS RELATIVE PERCENT: 0.7 % (ref 0–6)
EOSINOPHILS RELATIVE PERCENT: 0.9 % (ref 0–6)
EOSINOPHILS RELATIVE PERCENT: 0.9 % (ref 0–6)
EOSINOPHILS RELATIVE PERCENT: 1.3 % (ref 0–6)
EOSINOPHILS RELATIVE PERCENT: 2 % (ref 0–6)
EOSINOPHILS RELATIVE PERCENT: 2.5 % (ref 0–6)
EOSINOPHILS RELATIVE PERCENT: 2.6 % (ref 0–6)
EOSINOPHILS RELATIVE PERCENT: 2.8 % (ref 0–6)
GFR AFRICAN AMERICAN: >60
GFR NON-AFRICAN AMERICAN: >60 ML/MIN/1.73
GLUCOSE BLD-MCNC: 100 MG/DL (ref 74–99)
GLUCOSE BLD-MCNC: 101 MG/DL (ref 74–99)
GLUCOSE BLD-MCNC: 101 MG/DL (ref 74–99)
GLUCOSE BLD-MCNC: 104 MG/DL (ref 74–99)
GLUCOSE BLD-MCNC: 105 MG/DL (ref 74–99)
GLUCOSE BLD-MCNC: 108 MG/DL (ref 74–99)
GLUCOSE BLD-MCNC: 110 MG/DL (ref 74–99)
GLUCOSE BLD-MCNC: 112 MG/DL (ref 74–99)
GLUCOSE BLD-MCNC: 115 MG/DL (ref 74–99)
GLUCOSE BLD-MCNC: 117 MG/DL (ref 74–99)
GLUCOSE BLD-MCNC: 119 MG/DL (ref 74–99)
GLUCOSE BLD-MCNC: 123 MG/DL (ref 74–99)
GLUCOSE BLD-MCNC: 82 MG/DL (ref 74–99)
GLUCOSE BLD-MCNC: 89 MG/DL (ref 74–99)
GLUCOSE BLD-MCNC: 95 MG/DL (ref 74–99)
GLUCOSE BLD-MCNC: 96 MG/DL (ref 74–99)
GLUCOSE BLD-MCNC: 96 MG/DL (ref 74–99)
GLUCOSE BLD-MCNC: 97 MG/DL (ref 74–99)
GLUCOSE BLD-MCNC: 98 MG/DL (ref 74–99)
GLUCOSE BLD-MCNC: 98 MG/DL (ref 74–99)
GLUCOSE URINE: NEGATIVE MG/DL
GLUCOSE URINE: NEGATIVE MG/DL
HBA1C MFR BLD: 5.1 % (ref 4–5.6)
HBA1C MFR BLD: 5.6 % (ref 4–5.6)
HCO3: 33.1 MMOL/L (ref 22–26)
HCO3: 35.6 MMOL/L (ref 22–26)
HCO3: 39.1 MMOL/L (ref 22–26)
HCT VFR BLD CALC: 36.8 % (ref 34–48)
HCT VFR BLD CALC: 37 % (ref 34–48)
HCT VFR BLD CALC: 37.3 % (ref 34–48)
HCT VFR BLD CALC: 37.6 % (ref 34–48)
HCT VFR BLD CALC: 38.3 % (ref 34–48)
HCT VFR BLD CALC: 38.9 % (ref 34–48)
HCT VFR BLD CALC: 39.1 % (ref 34–48)
HCT VFR BLD CALC: 39.1 % (ref 34–48)
HCT VFR BLD CALC: 40 % (ref 34–48)
HCT VFR BLD CALC: 40 % (ref 34–48)
HCT VFR BLD CALC: 44.2 % (ref 34–48)
HCT VFR BLD CALC: 44.3 % (ref 34–48)
HEMOGLOBIN: 11.1 G/DL (ref 11.5–15.5)
HEMOGLOBIN: 11.2 G/DL (ref 11.5–15.5)
HEMOGLOBIN: 11.4 G/DL (ref 11.5–15.5)
HEMOGLOBIN: 11.4 G/DL (ref 11.5–15.5)
HEMOGLOBIN: 11.6 G/DL (ref 11.5–15.5)
HEMOGLOBIN: 11.6 G/DL (ref 11.5–15.5)
HEMOGLOBIN: 11.9 G/DL (ref 11.5–15.5)
HEMOGLOBIN: 11.9 G/DL (ref 11.5–15.5)
HEMOGLOBIN: 12 G/DL (ref 11.5–15.5)
HEMOGLOBIN: 12.1 G/DL (ref 11.5–15.5)
HEMOGLOBIN: 13.4 G/DL (ref 11.5–15.5)
HEMOGLOBIN: 13.4 G/DL (ref 11.5–15.5)
HHB: 2.7 % (ref 0–5)
HHB: 3.3 % (ref 0–5)
HHB: 3.9 % (ref 0–5)
HUMAN METAPNEUMOVIRUS BY PCR: NOT DETECTED
HUMAN RHINOVIRUS/ENTEROVIRUS BY PCR: NOT DETECTED
HYPOCHROMIA: ABNORMAL
HYPOCHROMIA: ABNORMAL
IMMATURE GRANULOCYTES #: 0.03 E9/L
IMMATURE GRANULOCYTES #: 0.04 E9/L
IMMATURE GRANULOCYTES #: 0.05 E9/L
IMMATURE GRANULOCYTES #: 0.05 E9/L
IMMATURE GRANULOCYTES #: 0.06 E9/L
IMMATURE GRANULOCYTES #: 0.06 E9/L
IMMATURE GRANULOCYTES %: 0.5 % (ref 0–5)
IMMATURE GRANULOCYTES %: 0.5 % (ref 0–5)
IMMATURE GRANULOCYTES %: 0.6 % (ref 0–5)
IMMATURE GRANULOCYTES %: 0.7 % (ref 0–5)
IMMATURE GRANULOCYTES %: 0.8 % (ref 0–5)
IMMATURE GRANULOCYTES %: 0.9 % (ref 0–5)
INFLUENZA A BY PCR: NOT DETECTED
INFLUENZA B BY PCR: NOT DETECTED
INR BLD: 1.1
KETONES, URINE: ABNORMAL MG/DL
KETONES, URINE: NEGATIVE MG/DL
L. PNEUMOPHILA SEROGP 1 UR AG: NORMAL
LAB: ABNORMAL
LACTIC ACID: 0.8 MMOL/L (ref 0.5–2.2)
LACTIC ACID: 1.7 MMOL/L (ref 0.5–2.2)
LEUKOCYTE ESTERASE, URINE: ABNORMAL
LEUKOCYTE ESTERASE, URINE: NEGATIVE
LV EF: 60 %
LV EF: 70 %
LVEF MODALITY: NORMAL
LVEF MODALITY: NORMAL
LYMPHOCYTES ABSOLUTE: 0.43 E9/L (ref 1.5–4)
LYMPHOCYTES ABSOLUTE: 0.47 E9/L (ref 1.5–4)
LYMPHOCYTES ABSOLUTE: 0.62 E9/L (ref 1.5–4)
LYMPHOCYTES ABSOLUTE: 0.78 E9/L (ref 1.5–4)
LYMPHOCYTES ABSOLUTE: 0.86 E9/L (ref 1.5–4)
LYMPHOCYTES ABSOLUTE: 0.87 E9/L (ref 1.5–4)
LYMPHOCYTES ABSOLUTE: 0.87 E9/L (ref 1.5–4)
LYMPHOCYTES ABSOLUTE: 1.19 E9/L (ref 1.5–4)
LYMPHOCYTES RELATIVE PERCENT: 11.5 % (ref 20–42)
LYMPHOCYTES RELATIVE PERCENT: 13.6 % (ref 20–42)
LYMPHOCYTES RELATIVE PERCENT: 14.1 % (ref 20–42)
LYMPHOCYTES RELATIVE PERCENT: 17.1 % (ref 20–42)
LYMPHOCYTES RELATIVE PERCENT: 4.3 % (ref 20–42)
LYMPHOCYTES RELATIVE PERCENT: 6.1 % (ref 20–42)
LYMPHOCYTES RELATIVE PERCENT: 7.9 % (ref 20–42)
LYMPHOCYTES RELATIVE PERCENT: 9 % (ref 20–42)
Lab: ABNORMAL
MAGNESIUM: 2 MG/DL (ref 1.6–2.6)
MAGNESIUM: 2 MG/DL (ref 1.6–2.6)
MAGNESIUM: 2.2 MG/DL (ref 1.6–2.6)
MCH RBC QN AUTO: 27.7 PG (ref 26–35)
MCH RBC QN AUTO: 27.9 PG (ref 26–35)
MCH RBC QN AUTO: 28 PG (ref 26–35)
MCH RBC QN AUTO: 28.1 PG (ref 26–35)
MCH RBC QN AUTO: 28.4 PG (ref 26–35)
MCH RBC QN AUTO: 28.7 PG (ref 26–35)
MCH RBC QN AUTO: 28.9 PG (ref 26–35)
MCH RBC QN AUTO: 29.1 PG (ref 26–35)
MCH RBC QN AUTO: 29.3 PG (ref 26–35)
MCH RBC QN AUTO: 29.3 PG (ref 26–35)
MCH RBC QN AUTO: 29.4 PG (ref 26–35)
MCH RBC QN AUTO: 29.5 PG (ref 26–35)
MCHC RBC AUTO-ENTMCNC: 29.8 % (ref 32–34.5)
MCHC RBC AUTO-ENTMCNC: 29.8 % (ref 32–34.5)
MCHC RBC AUTO-ENTMCNC: 30.2 % (ref 32–34.5)
MCHC RBC AUTO-ENTMCNC: 30.2 % (ref 32–34.5)
MCHC RBC AUTO-ENTMCNC: 30.3 % (ref 32–34.5)
MCHC RBC AUTO-ENTMCNC: 30.4 % (ref 32–34.5)
MCHC RBC AUTO-ENTMCNC: 30.6 % (ref 32–34.5)
MCHC RBC AUTO-ENTMCNC: 30.7 % (ref 32–34.5)
MCV RBC AUTO: 92.3 FL (ref 80–99.9)
MCV RBC AUTO: 92.7 FL (ref 80–99.9)
MCV RBC AUTO: 92.7 FL (ref 80–99.9)
MCV RBC AUTO: 92.8 FL (ref 80–99.9)
MCV RBC AUTO: 93.2 FL (ref 80–99.9)
MCV RBC AUTO: 95.4 FL (ref 80–99.9)
MCV RBC AUTO: 95.4 FL (ref 80–99.9)
MCV RBC AUTO: 96.2 FL (ref 80–99.9)
MCV RBC AUTO: 96.3 FL (ref 80–99.9)
MCV RBC AUTO: 96.7 FL (ref 80–99.9)
MCV RBC AUTO: 97 FL (ref 80–99.9)
MCV RBC AUTO: 97 FL (ref 80–99.9)
METER GLUCOSE: 110 MG/DL (ref 74–99)
METER GLUCOSE: 127 MG/DL (ref 74–99)
METER GLUCOSE: 204 MG/DL (ref 74–99)
METER GLUCOSE: 87 MG/DL (ref 74–99)
METER GLUCOSE: 94 MG/DL (ref 74–99)
METHB: 0.2 % (ref 0–1.5)
MODE: ABNORMAL
MONOCYTES ABSOLUTE: 0.78 E9/L (ref 0.1–0.95)
MONOCYTES ABSOLUTE: 0.88 E9/L (ref 0.1–0.95)
MONOCYTES ABSOLUTE: 0.92 E9/L (ref 0.1–0.95)
MONOCYTES ABSOLUTE: 0.96 E9/L (ref 0.1–0.95)
MONOCYTES ABSOLUTE: 0.97 E9/L (ref 0.1–0.95)
MONOCYTES ABSOLUTE: 1 E9/L (ref 0.1–0.95)
MONOCYTES ABSOLUTE: 1.09 E9/L (ref 0.1–0.95)
MONOCYTES ABSOLUTE: 1.21 E9/L (ref 0.1–0.95)
MONOCYTES RELATIVE PERCENT: 12.3 % (ref 2–12)
MONOCYTES RELATIVE PERCENT: 13.2 % (ref 2–12)
MONOCYTES RELATIVE PERCENT: 13.9 % (ref 2–12)
MONOCYTES RELATIVE PERCENT: 14 % (ref 2–12)
MONOCYTES RELATIVE PERCENT: 14 % (ref 2–12)
MONOCYTES RELATIVE PERCENT: 14.4 % (ref 2–12)
MONOCYTES RELATIVE PERCENT: 15 % (ref 2–12)
MONOCYTES RELATIVE PERCENT: 7.8 % (ref 2–12)
MRSA CULTURE ONLY: NORMAL
MYCOPLASMA PNEUMONIAE BY PCR: NOT DETECTED
MYELOCYTE PERCENT: 0.9 % (ref 0–0)
NEUTROPHILS ABSOLUTE: 4.08 E9/L (ref 1.8–7.3)
NEUTROPHILS ABSOLUTE: 4.33 E9/L (ref 1.8–7.3)
NEUTROPHILS ABSOLUTE: 4.5 E9/L (ref 1.8–7.3)
NEUTROPHILS ABSOLUTE: 5.45 E9/L (ref 1.8–7.3)
NEUTROPHILS ABSOLUTE: 6.08 E9/L (ref 1.8–7.3)
NEUTROPHILS ABSOLUTE: 6.16 E9/L (ref 1.8–7.3)
NEUTROPHILS ABSOLUTE: 6.47 E9/L (ref 1.8–7.3)
NEUTROPHILS ABSOLUTE: 8.63 E9/L (ref 1.8–7.3)
NEUTROPHILS RELATIVE PERCENT: 64.8 % (ref 43–80)
NEUTROPHILS RELATIVE PERCENT: 66.9 % (ref 43–80)
NEUTROPHILS RELATIVE PERCENT: 67.6 % (ref 43–80)
NEUTROPHILS RELATIVE PERCENT: 72.1 % (ref 43–80)
NEUTROPHILS RELATIVE PERCENT: 74.7 % (ref 43–80)
NEUTROPHILS RELATIVE PERCENT: 78.1 % (ref 43–80)
NEUTROPHILS RELATIVE PERCENT: 79.1 % (ref 43–80)
NEUTROPHILS RELATIVE PERCENT: 86.2 % (ref 43–80)
NITRITE, URINE: NEGATIVE
NITRITE, URINE: POSITIVE
NUCLEATED RED BLOOD CELLS: 0.9 /100 WBC
O2 CONTENT: 15.8 ML/DL
O2 CONTENT: 16 ML/DL
O2 CONTENT: 17.2 ML/DL
O2 SATURATION: 96 % (ref 92–98.5)
O2 SATURATION: 96.6 % (ref 92–98.5)
O2 SATURATION: 97.2 % (ref 92–98.5)
O2HB: 94 % (ref 94–97)
O2HB: 94.7 % (ref 94–97)
O2HB: 95.3 % (ref 94–97)
OPERATOR ID: 1394
OPERATOR ID: 1768
OPERATOR ID: 421
ORGANISM: ABNORMAL
OVALOCYTES: ABNORMAL
PARAINFLUENZA VIRUS 1 BY PCR: NOT DETECTED
PARAINFLUENZA VIRUS 2 BY PCR: NOT DETECTED
PARAINFLUENZA VIRUS 3 BY PCR: NOT DETECTED
PARAINFLUENZA VIRUS 4 BY PCR: NOT DETECTED
PATIENT TEMP: 37 C
PCO2: 54.5 MMHG (ref 35–45)
PCO2: 56.7 MMHG (ref 35–45)
PCO2: 58.2 MMHG (ref 35–45)
PDW BLD-RTO: 12.7 FL (ref 11.5–15)
PDW BLD-RTO: 12.8 FL (ref 11.5–15)
PDW BLD-RTO: 12.9 FL (ref 11.5–15)
PDW BLD-RTO: 13 FL (ref 11.5–15)
PDW BLD-RTO: 13.1 FL (ref 11.5–15)
PDW BLD-RTO: 13.3 FL (ref 11.5–15)
PDW BLD-RTO: 13.5 FL (ref 11.5–15)
PDW BLD-RTO: 13.7 FL (ref 11.5–15)
PDW BLD-RTO: 13.8 FL (ref 11.5–15)
PDW BLD-RTO: 13.9 FL (ref 11.5–15)
PDW BLD-RTO: 14.1 FL (ref 11.5–15)
PDW BLD-RTO: 14.1 FL (ref 11.5–15)
PH BLOOD GAS: 7.4 (ref 7.35–7.45)
PH BLOOD GAS: 7.4 (ref 7.35–7.45)
PH BLOOD GAS: 7.46 (ref 7.35–7.45)
PH UA: 5.5 (ref 5–9)
PH UA: 7 (ref 5–9)
PHOSPHORUS: 4.3 MG/DL (ref 2.5–4.5)
PLATELET # BLD: 122 E9/L (ref 130–450)
PLATELET # BLD: 154 E9/L (ref 130–450)
PLATELET # BLD: 154 E9/L (ref 130–450)
PLATELET # BLD: 165 E9/L (ref 130–450)
PLATELET # BLD: 169 E9/L (ref 130–450)
PLATELET # BLD: 172 E9/L (ref 130–450)
PLATELET # BLD: 173 E9/L (ref 130–450)
PLATELET # BLD: 185 E9/L (ref 130–450)
PLATELET # BLD: 202 E9/L (ref 130–450)
PLATELET # BLD: 226 E9/L (ref 130–450)
PLATELET # BLD: 91 E9/L (ref 130–450)
PLATELET # BLD: 98 E9/L (ref 130–450)
PLATELET CONFIRMATION: NORMAL
PLATELET CONFIRMATION: NORMAL
PMV BLD AUTO: 10.7 FL (ref 7–12)
PMV BLD AUTO: 10.7 FL (ref 7–12)
PMV BLD AUTO: 10.8 FL (ref 7–12)
PMV BLD AUTO: 10.9 FL (ref 7–12)
PMV BLD AUTO: 11 FL (ref 7–12)
PMV BLD AUTO: 11.4 FL (ref 7–12)
PMV BLD AUTO: 11.8 FL (ref 7–12)
PMV BLD AUTO: 11.9 FL (ref 7–12)
PO2: 113.9 MMHG (ref 75–100)
PO2: 91.5 MMHG (ref 75–100)
PO2: 99.6 MMHG (ref 75–100)
POC ACT LR: 173 SECONDS
POC ACT LR: 173 SECONDS
POC ACT LR: 329 SECONDS
POIKILOCYTES: ABNORMAL
POLYCHROMASIA: ABNORMAL
POLYCHROMASIA: ABNORMAL
POTASSIUM REFLEX MAGNESIUM: 3.1 MMOL/L (ref 3.5–5)
POTASSIUM REFLEX MAGNESIUM: 3.3 MMOL/L (ref 3.5–5)
POTASSIUM REFLEX MAGNESIUM: 3.3 MMOL/L (ref 3.5–5)
POTASSIUM REFLEX MAGNESIUM: 3.6 MMOL/L (ref 3.5–5)
POTASSIUM REFLEX MAGNESIUM: 3.6 MMOL/L (ref 3.5–5)
POTASSIUM REFLEX MAGNESIUM: 4 MMOL/L (ref 3.5–5)
POTASSIUM REFLEX MAGNESIUM: 4 MMOL/L (ref 3.5–5)
POTASSIUM SERPL-SCNC: 3.4 MMOL/L (ref 3.5–5)
POTASSIUM SERPL-SCNC: 3.5 MMOL/L (ref 3.5–5)
POTASSIUM SERPL-SCNC: 3.6 MMOL/L (ref 3.5–5)
POTASSIUM SERPL-SCNC: 3.7 MMOL/L (ref 3.5–5)
POTASSIUM SERPL-SCNC: 3.7 MMOL/L (ref 3.5–5)
POTASSIUM SERPL-SCNC: 3.9 MMOL/L (ref 3.5–5)
POTASSIUM SERPL-SCNC: 4.1 MMOL/L (ref 3.5–5)
POTASSIUM SERPL-SCNC: 4.1 MMOL/L (ref 3.5–5)
POTASSIUM SERPL-SCNC: 4.2 MMOL/L (ref 3.5–5)
POTASSIUM SERPL-SCNC: 4.2 MMOL/L (ref 3.5–5)
POTASSIUM SERPL-SCNC: 4.4 MMOL/L (ref 3.5–5)
POTASSIUM SERPL-SCNC: 4.4 MMOL/L (ref 3.5–5)
POTASSIUM SERPL-SCNC: 4.6 MMOL/L (ref 3.5–5)
POTASSIUM SERPL-SCNC: 5 MMOL/L (ref 3.5–5)
POTASSIUM SERPL-SCNC: 5 MMOL/L (ref 3.5–5)
POTASSIUM SERPL-SCNC: 5.1 MMOL/L (ref 3.5–5)
PRO-BNP: 2566 PG/ML (ref 0–450)
PRO-BNP: 2755 PG/ML (ref 0–450)
PROCALCITONIN: 0.09 NG/ML (ref 0–0.08)
PROCALCITONIN: 0.09 NG/ML (ref 0–0.08)
PROTEIN UA: NEGATIVE MG/DL
PROTEIN UA: NEGATIVE MG/DL
PROTHROMBIN TIME: 11.6 SEC (ref 9.3–12.4)
RBC # BLD: 3.94 E12/L (ref 3.5–5.5)
RBC # BLD: 3.95 E12/L (ref 3.5–5.5)
RBC # BLD: 3.97 E12/L (ref 3.5–5.5)
RBC # BLD: 4.01 E12/L (ref 3.5–5.5)
RBC # BLD: 4.02 E12/L (ref 3.5–5.5)
RBC # BLD: 4.03 E12/L (ref 3.5–5.5)
RBC # BLD: 4.04 E12/L (ref 3.5–5.5)
RBC # BLD: 4.1 E12/L (ref 3.5–5.5)
RBC # BLD: 4.16 E12/L (ref 3.5–5.5)
RBC # BLD: 4.29 E12/L (ref 3.5–5.5)
RBC # BLD: 4.58 E12/L (ref 3.5–5.5)
RBC # BLD: 4.77 E12/L (ref 3.5–5.5)
RBC UA: ABNORMAL /HPF (ref 0–2)
RESPIRATORY SYNCYTIAL VIRUS BY PCR: NOT DETECTED
SARS-COV-2, NAAT: NOT DETECTED
SARS-COV-2, PCR: NOT DETECTED
SCHISTOCYTES: ABNORMAL
SMEAR, RESPIRATORY: NORMAL
SODIUM BLD-SCNC: 133 MMOL/L (ref 132–146)
SODIUM BLD-SCNC: 136 MMOL/L (ref 132–146)
SODIUM BLD-SCNC: 137 MMOL/L (ref 132–146)
SODIUM BLD-SCNC: 138 MMOL/L (ref 132–146)
SODIUM BLD-SCNC: 139 MMOL/L (ref 132–146)
SODIUM BLD-SCNC: 140 MMOL/L (ref 132–146)
SODIUM BLD-SCNC: 141 MMOL/L (ref 132–146)
SODIUM BLD-SCNC: 141 MMOL/L (ref 132–146)
SODIUM BLD-SCNC: 142 MMOL/L (ref 132–146)
SODIUM BLD-SCNC: 144 MMOL/L (ref 132–146)
SODIUM BLD-SCNC: 145 MMOL/L (ref 132–146)
SODIUM BLD-SCNC: 145 MMOL/L (ref 132–146)
SOURCE, BLOOD GAS: ABNORMAL
SPECIFIC GRAVITY UA: 1.02 (ref 1–1.03)
SPECIFIC GRAVITY UA: <=1.005 (ref 1–1.03)
STREP PNEUMONIAE ANTIGEN, URINE: NORMAL
T4 FREE: 1.23 NG/DL (ref 0.93–1.7)
THB: 11.8 G/DL (ref 11.5–16.5)
THB: 11.9 G/DL (ref 11.5–16.5)
THB: 12.8 G/DL (ref 11.5–16.5)
TIME ANALYZED: 1409
TIME ANALYZED: 1619
TIME ANALYZED: 835
TOTAL PROTEIN: 6 G/DL (ref 6.4–8.3)
TOTAL PROTEIN: 6.2 G/DL (ref 6.4–8.3)
TOTAL PROTEIN: 6.2 G/DL (ref 6.4–8.3)
TOTAL PROTEIN: 6.3 G/DL (ref 6.4–8.3)
TOTAL PROTEIN: 6.3 G/DL (ref 6.4–8.3)
TOTAL PROTEIN: 6.5 G/DL (ref 6.4–8.3)
TOTAL PROTEIN: 7.2 G/DL (ref 6.4–8.3)
TOTAL PROTEIN: 7.3 G/DL (ref 6.4–8.3)
TROPONIN: <0.01 NG/ML (ref 0–0.03)
TSH SERPL DL<=0.05 MIU/L-ACNC: 1.42 UIU/ML (ref 0.27–4.2)
UROBILINOGEN, URINE: 0.2 E.U./DL
UROBILINOGEN, URINE: 0.2 E.U./DL
WBC # BLD: 10 E9/L (ref 4.5–11.5)
WBC # BLD: 11.7 E9/L (ref 4.5–11.5)
WBC # BLD: 6.1 E9/L (ref 4.5–11.5)
WBC # BLD: 6.4 E9/L (ref 4.5–11.5)
WBC # BLD: 6.9 E9/L (ref 4.5–11.5)
WBC # BLD: 7.5 E9/L (ref 4.5–11.5)
WBC # BLD: 7.6 E9/L (ref 4.5–11.5)
WBC # BLD: 7.6 E9/L (ref 4.5–11.5)
WBC # BLD: 7.7 E9/L (ref 4.5–11.5)
WBC # BLD: 7.8 E9/L (ref 4.5–11.5)
WBC # BLD: 8.7 E9/L (ref 4.5–11.5)
WBC # BLD: 9.4 E9/L (ref 4.5–11.5)
WBC UA: ABNORMAL /HPF (ref 0–5)

## 2021-01-01 PROCEDURE — 97530 THERAPEUTIC ACTIVITIES: CPT

## 2021-01-01 PROCEDURE — 2700000000 HC OXYGEN THERAPY PER DAY

## 2021-01-01 PROCEDURE — 84145 PROCALCITONIN (PCT): CPT

## 2021-01-01 PROCEDURE — 84439 ASSAY OF FREE THYROXINE: CPT

## 2021-01-01 PROCEDURE — 6360000002 HC RX W HCPCS: Performed by: INTERNAL MEDICINE

## 2021-01-01 PROCEDURE — 6370000000 HC RX 637 (ALT 250 FOR IP): Performed by: STUDENT IN AN ORGANIZED HEALTH CARE EDUCATION/TRAINING PROGRAM

## 2021-01-01 PROCEDURE — 02RF38Z REPLACEMENT OF AORTIC VALVE WITH ZOOPLASTIC TISSUE, PERCUTANEOUS APPROACH: ICD-10-PCS | Performed by: INTERNAL MEDICINE

## 2021-01-01 PROCEDURE — 36415 COLL VENOUS BLD VENIPUNCTURE: CPT

## 2021-01-01 PROCEDURE — 1123F ACP DISCUSS/DSCN MKR DOCD: CPT | Performed by: INTERNAL MEDICINE

## 2021-01-01 PROCEDURE — 83735 ASSAY OF MAGNESIUM: CPT

## 2021-01-01 PROCEDURE — 99223 1ST HOSP IP/OBS HIGH 75: CPT | Performed by: INTERNAL MEDICINE

## 2021-01-01 PROCEDURE — 86900 BLOOD TYPING SEROLOGIC ABO: CPT

## 2021-01-01 PROCEDURE — 2580000003 HC RX 258: Performed by: RADIOLOGY

## 2021-01-01 PROCEDURE — 2580000003 HC RX 258: Performed by: STUDENT IN AN ORGANIZED HEALTH CARE EDUCATION/TRAINING PROGRAM

## 2021-01-01 PROCEDURE — 99222 1ST HOSP IP/OBS MODERATE 55: CPT | Performed by: FAMILY MEDICINE

## 2021-01-01 PROCEDURE — 2500000003 HC RX 250 WO HCPCS: Performed by: PHYSICIAN ASSISTANT

## 2021-01-01 PROCEDURE — 75572 CT HRT W/3D IMAGE: CPT

## 2021-01-01 PROCEDURE — 2580000003 HC RX 258: Performed by: PHYSICIAN ASSISTANT

## 2021-01-01 PROCEDURE — 1090F PRES/ABSN URINE INCON ASSESS: CPT | Performed by: PHYSICIAN ASSISTANT

## 2021-01-01 PROCEDURE — 6360000002 HC RX W HCPCS

## 2021-01-01 PROCEDURE — C1725 CATH, TRANSLUMIN NON-LASER: HCPCS

## 2021-01-01 PROCEDURE — 99222 1ST HOSP IP/OBS MODERATE 55: CPT | Performed by: INTERNAL MEDICINE

## 2021-01-01 PROCEDURE — 94760 N-INVAS EAR/PLS OXIMETRY 1: CPT

## 2021-01-01 PROCEDURE — 4040F PNEUMOC VAC/ADMIN/RCVD: CPT | Performed by: INTERNAL MEDICINE

## 2021-01-01 PROCEDURE — APPSS60 APP SPLIT SHARED TIME 46-60 MINUTES: Performed by: NURSE PRACTITIONER

## 2021-01-01 PROCEDURE — 82805 BLOOD GASES W/O2 SATURATION: CPT

## 2021-01-01 PROCEDURE — 93000 ELECTROCARDIOGRAM COMPLETE: CPT | Performed by: INTERNAL MEDICINE

## 2021-01-01 PROCEDURE — 99222 1ST HOSP IP/OBS MODERATE 55: CPT | Performed by: THORACIC SURGERY (CARDIOTHORACIC VASCULAR SURGERY)

## 2021-01-01 PROCEDURE — 80053 COMPREHEN METABOLIC PANEL: CPT

## 2021-01-01 PROCEDURE — 94729 DIFFUSING CAPACITY: CPT | Performed by: INTERNAL MEDICINE

## 2021-01-01 PROCEDURE — G8427 DOCREV CUR MEDS BY ELIG CLIN: HCPCS | Performed by: INTERNAL MEDICINE

## 2021-01-01 PROCEDURE — 1090F PRES/ABSN URINE INCON ASSESS: CPT | Performed by: INTERNAL MEDICINE

## 2021-01-01 PROCEDURE — 99214 OFFICE O/P EST MOD 30 MIN: CPT | Performed by: INTERNAL MEDICINE

## 2021-01-01 PROCEDURE — 71045 X-RAY EXAM CHEST 1 VIEW: CPT

## 2021-01-01 PROCEDURE — 2500000003 HC RX 250 WO HCPCS: Performed by: STUDENT IN AN ORGANIZED HEALTH CARE EDUCATION/TRAINING PROGRAM

## 2021-01-01 PROCEDURE — 99233 SBSQ HOSP IP/OBS HIGH 50: CPT | Performed by: INTERNAL MEDICINE

## 2021-01-01 PROCEDURE — 83880 ASSAY OF NATRIURETIC PEPTIDE: CPT

## 2021-01-01 PROCEDURE — G8420 CALC BMI NORM PARAMETERS: HCPCS | Performed by: INTERNAL MEDICINE

## 2021-01-01 PROCEDURE — 0202U NFCT DS 22 TRGT SARS-COV-2: CPT

## 2021-01-01 PROCEDURE — 94729 DIFFUSING CAPACITY: CPT

## 2021-01-01 PROCEDURE — 1036F TOBACCO NON-USER: CPT | Performed by: INTERNAL MEDICINE

## 2021-01-01 PROCEDURE — 2140000000 HC CCU INTERMEDIATE R&B

## 2021-01-01 PROCEDURE — G8417 CALC BMI ABV UP PARAM F/U: HCPCS | Performed by: INTERNAL MEDICINE

## 2021-01-01 PROCEDURE — 85027 COMPLETE CBC AUTOMATED: CPT

## 2021-01-01 PROCEDURE — 6360000002 HC RX W HCPCS: Performed by: PHYSICIAN ASSISTANT

## 2021-01-01 PROCEDURE — 93010 ELECTROCARDIOGRAM REPORT: CPT | Performed by: INTERNAL MEDICINE

## 2021-01-01 PROCEDURE — G8428 CUR MEDS NOT DOCUMENT: HCPCS | Performed by: PHYSICIAN ASSISTANT

## 2021-01-01 PROCEDURE — 2060000000 HC ICU INTERMEDIATE R&B

## 2021-01-01 PROCEDURE — 6360000002 HC RX W HCPCS: Performed by: STUDENT IN AN ORGANIZED HEALTH CARE EDUCATION/TRAINING PROGRAM

## 2021-01-01 PROCEDURE — 99284 EMERGENCY DEPT VISIT MOD MDM: CPT

## 2021-01-01 PROCEDURE — 99205 OFFICE O/P NEW HI 60 MIN: CPT | Performed by: INTERNAL MEDICINE

## 2021-01-01 PROCEDURE — 87206 SMEAR FLUORESCENT/ACID STAI: CPT

## 2021-01-01 PROCEDURE — C1725 CATH, TRANSLUMIN NON-LASER: HCPCS | Performed by: INTERNAL MEDICINE

## 2021-01-01 PROCEDURE — 1036F TOBACCO NON-USER: CPT | Performed by: PHYSICIAN ASSISTANT

## 2021-01-01 PROCEDURE — 1111F DSCHRG MED/CURRENT MED MERGE: CPT | Performed by: INTERNAL MEDICINE

## 2021-01-01 PROCEDURE — 84443 ASSAY THYROID STIM HORMONE: CPT

## 2021-01-01 PROCEDURE — 3700000001 HC ADD 15 MINUTES (ANESTHESIA): Performed by: INTERNAL MEDICINE

## 2021-01-01 PROCEDURE — 87186 SC STD MICRODIL/AGAR DIL: CPT

## 2021-01-01 PROCEDURE — 86850 RBC ANTIBODY SCREEN: CPT

## 2021-01-01 PROCEDURE — 74174 CTA ABD&PLVS W/CONTRAST: CPT

## 2021-01-01 PROCEDURE — 93306 TTE W/DOPPLER COMPLETE: CPT

## 2021-01-01 PROCEDURE — 94640 AIRWAY INHALATION TREATMENT: CPT

## 2021-01-01 PROCEDURE — 36600 WITHDRAWAL OF ARTERIAL BLOOD: CPT

## 2021-01-01 PROCEDURE — 85025 COMPLETE CBC W/AUTO DIFF WBC: CPT

## 2021-01-01 PROCEDURE — 93970 EXTREMITY STUDY: CPT | Performed by: RADIOLOGY

## 2021-01-01 PROCEDURE — 2780000006 HC MISC HEART VALVE: Performed by: INTERNAL MEDICINE

## 2021-01-01 PROCEDURE — 6370000000 HC RX 637 (ALT 250 FOR IP): Performed by: PHYSICIAN ASSISTANT

## 2021-01-01 PROCEDURE — 1123F ACP DISCUSS/DSCN MKR DOCD: CPT | Performed by: PHYSICIAN ASSISTANT

## 2021-01-01 PROCEDURE — 83605 ASSAY OF LACTIC ACID: CPT

## 2021-01-01 PROCEDURE — 80048 BASIC METABOLIC PNL TOTAL CA: CPT

## 2021-01-01 PROCEDURE — 3700000000 HC ANESTHESIA ATTENDED CARE: Performed by: INTERNAL MEDICINE

## 2021-01-01 PROCEDURE — C1894 INTRO/SHEATH, NON-LASER: HCPCS | Performed by: INTERNAL MEDICINE

## 2021-01-01 PROCEDURE — 85610 PROTHROMBIN TIME: CPT

## 2021-01-01 PROCEDURE — 94726 PLETHYSMOGRAPHY LUNG VOLUMES: CPT | Performed by: INTERNAL MEDICINE

## 2021-01-01 PROCEDURE — 32555 ASPIRATE PLEURA W/ IMAGING: CPT | Performed by: INTERNAL MEDICINE

## 2021-01-01 PROCEDURE — 99213 OFFICE O/P EST LOW 20 MIN: CPT | Performed by: PHYSICIAN ASSISTANT

## 2021-01-01 PROCEDURE — 0W9B3ZZ DRAINAGE OF LEFT PLEURAL CAVITY, PERCUTANEOUS APPROACH: ICD-10-PCS | Performed by: INTERNAL MEDICINE

## 2021-01-01 PROCEDURE — 6370000000 HC RX 637 (ALT 250 FOR IP): Performed by: NURSE PRACTITIONER

## 2021-01-01 PROCEDURE — 99232 SBSQ HOSP IP/OBS MODERATE 35: CPT | Performed by: INTERNAL MEDICINE

## 2021-01-01 PROCEDURE — 0001A COVID-19, PFIZER VACCINE 30MCG/0.3ML DOSE: CPT

## 2021-01-01 PROCEDURE — 91300 COVID-19, PFIZER VACCINE 30MCG/0.3ML DOSE: CPT

## 2021-01-01 PROCEDURE — 6360000004 HC RX CONTRAST MEDICATION: Performed by: RADIOLOGY

## 2021-01-01 PROCEDURE — 4040F PNEUMOC VAC/ADMIN/RCVD: CPT | Performed by: PHYSICIAN ASSISTANT

## 2021-01-01 PROCEDURE — 2500000003 HC RX 250 WO HCPCS: Performed by: INTERNAL MEDICINE

## 2021-01-01 PROCEDURE — 82962 GLUCOSE BLOOD TEST: CPT

## 2021-01-01 PROCEDURE — 33361 REPLACE AORTIC VALVE PERQ: CPT | Performed by: INTERNAL MEDICINE

## 2021-01-01 PROCEDURE — 3600000018 HC SURGERY OHS ADDTL 15MIN: Performed by: INTERNAL MEDICINE

## 2021-01-01 PROCEDURE — 85347 COAGULATION TIME ACTIVATED: CPT

## 2021-01-01 PROCEDURE — 33361 REPLACE AORTIC VALVE PERQ: CPT | Performed by: THORACIC SURGERY (CARDIOTHORACIC VASCULAR SURGERY)

## 2021-01-01 PROCEDURE — 93005 ELECTROCARDIOGRAM TRACING: CPT | Performed by: STUDENT IN AN ORGANIZED HEALTH CARE EDUCATION/TRAINING PROGRAM

## 2021-01-01 PROCEDURE — 99204 OFFICE O/P NEW MOD 45 MIN: CPT | Performed by: INTERNAL MEDICINE

## 2021-01-01 PROCEDURE — 2709999900 HC NON-CHARGEABLE SUPPLY

## 2021-01-01 PROCEDURE — 84132 ASSAY OF SERUM POTASSIUM: CPT

## 2021-01-01 PROCEDURE — 93970 EXTREMITY STUDY: CPT

## 2021-01-01 PROCEDURE — 94375 RESPIRATORY FLOW VOLUME LOOP: CPT

## 2021-01-01 PROCEDURE — 0002A COVID-19, PFIZER VACCINE 30MCG/0.3ML DOSE: CPT | Performed by: NURSE PRACTITIONER

## 2021-01-01 PROCEDURE — 71275 CT ANGIOGRAPHY CHEST: CPT

## 2021-01-01 PROCEDURE — 2500000003 HC RX 250 WO HCPCS

## 2021-01-01 PROCEDURE — 87077 CULTURE AEROBIC IDENTIFY: CPT

## 2021-01-01 PROCEDURE — 93880 EXTRACRANIAL BILAT STUDY: CPT

## 2021-01-01 PROCEDURE — 6370000000 HC RX 637 (ALT 250 FOR IP): Performed by: INTERNAL MEDICINE

## 2021-01-01 PROCEDURE — 99239 HOSP IP/OBS DSCHRG MGMT >30: CPT | Performed by: FAMILY MEDICINE

## 2021-01-01 PROCEDURE — 83036 HEMOGLOBIN GLYCOSYLATED A1C: CPT

## 2021-01-01 PROCEDURE — 87070 CULTURE OTHR SPECIMN AEROBIC: CPT

## 2021-01-01 PROCEDURE — 71046 X-RAY EXAM CHEST 2 VIEWS: CPT

## 2021-01-01 PROCEDURE — 6360000002 HC RX W HCPCS: Performed by: EMERGENCY MEDICINE

## 2021-01-01 PROCEDURE — 93005 ELECTROCARDIOGRAM TRACING: CPT | Performed by: PHYSICIAN ASSISTANT

## 2021-01-01 PROCEDURE — 99232 SBSQ HOSP IP/OBS MODERATE 35: CPT | Performed by: FAMILY MEDICINE

## 2021-01-01 PROCEDURE — C1760 CLOSURE DEV, VASC: HCPCS | Performed by: INTERNAL MEDICINE

## 2021-01-01 PROCEDURE — 86901 BLOOD TYPING SEROLOGIC RH(D): CPT

## 2021-01-01 PROCEDURE — 94660 CPAP INITIATION&MGMT: CPT

## 2021-01-01 PROCEDURE — 93308 TTE F-UP OR LMTD: CPT

## 2021-01-01 PROCEDURE — G8428 CUR MEDS NOT DOCUMENT: HCPCS | Performed by: INTERNAL MEDICINE

## 2021-01-01 PROCEDURE — 81001 URINALYSIS AUTO W/SCOPE: CPT

## 2021-01-01 PROCEDURE — 3600000008 HC SURGERY OHS BASE: Performed by: INTERNAL MEDICINE

## 2021-01-01 PROCEDURE — 87081 CULTURE SCREEN ONLY: CPT

## 2021-01-01 PROCEDURE — G8484 FLU IMMUNIZE NO ADMIN: HCPCS | Performed by: INTERNAL MEDICINE

## 2021-01-01 PROCEDURE — 97161 PT EVAL LOW COMPLEX 20 MIN: CPT

## 2021-01-01 PROCEDURE — 81003 URINALYSIS AUTO W/O SCOPE: CPT

## 2021-01-01 PROCEDURE — 05HM33Z INSERTION OF INFUSION DEVICE INTO RIGHT INTERNAL JUGULAR VEIN, PERCUTANEOUS APPROACH: ICD-10-PCS | Performed by: ANESTHESIOLOGY

## 2021-01-01 PROCEDURE — 91300 COVID-19, PFIZER VACCINE 30MCG/0.3ML DOSE: CPT | Performed by: NURSE PRACTITIONER

## 2021-01-01 PROCEDURE — 6370000000 HC RX 637 (ALT 250 FOR IP): Performed by: FAMILY MEDICINE

## 2021-01-01 PROCEDURE — 6360000002 HC RX W HCPCS: Performed by: NURSE PRACTITIONER

## 2021-01-01 PROCEDURE — 94010 BREATHING CAPACITY TEST: CPT | Performed by: INTERNAL MEDICINE

## 2021-01-01 PROCEDURE — 93880 EXTRACRANIAL BILAT STUDY: CPT | Performed by: RADIOLOGY

## 2021-01-01 PROCEDURE — 87449 NOS EACH ORGANISM AG IA: CPT

## 2021-01-01 PROCEDURE — 2580000003 HC RX 258

## 2021-01-01 PROCEDURE — B3101ZZ FLUOROSCOPY OF THORACIC AORTA USING LOW OSMOLAR CONTRAST: ICD-10-PCS | Performed by: INTERNAL MEDICINE

## 2021-01-01 PROCEDURE — 97535 SELF CARE MNGMENT TRAINING: CPT

## 2021-01-01 PROCEDURE — 99232 SBSQ HOSP IP/OBS MODERATE 35: CPT | Performed by: PHYSICIAN ASSISTANT

## 2021-01-01 PROCEDURE — 87040 BLOOD CULTURE FOR BACTERIA: CPT

## 2021-01-01 PROCEDURE — 80162 ASSAY OF DIGOXIN TOTAL: CPT

## 2021-01-01 PROCEDURE — 2709999900 HC NON-CHARGEABLE SUPPLY: Performed by: INTERNAL MEDICINE

## 2021-01-01 PROCEDURE — 85730 THROMBOPLASTIN TIME PARTIAL: CPT

## 2021-01-01 PROCEDURE — 97165 OT EVAL LOW COMPLEX 30 MIN: CPT

## 2021-01-01 PROCEDURE — P9041 ALBUMIN (HUMAN),5%, 50ML: HCPCS

## 2021-01-01 PROCEDURE — C1729 CATH, DRAINAGE: HCPCS

## 2021-01-01 PROCEDURE — C1769 GUIDE WIRE: HCPCS | Performed by: INTERNAL MEDICINE

## 2021-01-01 PROCEDURE — 84484 ASSAY OF TROPONIN QUANT: CPT

## 2021-01-01 PROCEDURE — G8420 CALC BMI NORM PARAMETERS: HCPCS | Performed by: PHYSICIAN ASSISTANT

## 2021-01-01 PROCEDURE — C1769 GUIDE WIRE: HCPCS

## 2021-01-01 PROCEDURE — 1111F DSCHRG MED/CURRENT MED MERGE: CPT | Performed by: PHYSICIAN ASSISTANT

## 2021-01-01 PROCEDURE — 87088 URINE BACTERIA CULTURE: CPT

## 2021-01-01 PROCEDURE — 86923 COMPATIBILITY TEST ELECTRIC: CPT

## 2021-01-01 PROCEDURE — 5A1213Z PERFORMANCE OF CARDIAC PACING, INTERMITTENT: ICD-10-PCS | Performed by: INTERNAL MEDICINE

## 2021-01-01 PROCEDURE — 84100 ASSAY OF PHOSPHORUS: CPT

## 2021-01-01 PROCEDURE — 87635 SARS-COV-2 COVID-19 AMP PRB: CPT

## 2021-01-01 DEVICE — VLV EVPROPLUS-26 COMM US
Type: IMPLANTABLE DEVICE | Site: HEART | Status: FUNCTIONAL
Brand: EVOLUT™ PRO+

## 2021-01-01 DEVICE — CATHETER PACE 5FR L110CM 6FR INTRO D10CM 1MM SPACE POLYUR: Type: IMPLANTABLE DEVICE | Site: HEART | Status: FUNCTIONAL

## 2021-01-01 RX ORDER — ALBUTEROL SULFATE 2.5 MG/3ML
2.5 SOLUTION RESPIRATORY (INHALATION) EVERY 6 HOURS PRN
Status: DISCONTINUED | OUTPATIENT
Start: 2021-01-01 | End: 2021-01-01 | Stop reason: HOSPADM

## 2021-01-01 RX ORDER — PREDNISONE 1 MG/1
5 TABLET ORAL DAILY
Status: DISCONTINUED | OUTPATIENT
Start: 2021-01-01 | End: 2021-01-01 | Stop reason: HOSPADM

## 2021-01-01 RX ORDER — GABAPENTIN 100 MG/1
CAPSULE ORAL
Qty: 30 CAPSULE | Refills: 3 | Status: SHIPPED
Start: 2021-01-01 | End: 2021-01-01 | Stop reason: SDUPTHER

## 2021-01-01 RX ORDER — ISOSORBIDE MONONITRATE 30 MG/1
30 TABLET, EXTENDED RELEASE ORAL DAILY
Status: DISCONTINUED | OUTPATIENT
Start: 2021-01-01 | End: 2021-01-01 | Stop reason: HOSPADM

## 2021-01-01 RX ORDER — SULFAMETHOXAZOLE AND TRIMETHOPRIM 800; 160 MG/1; MG/1
1 TABLET ORAL 2 TIMES DAILY
Qty: 14 TABLET | Refills: 0 | Status: SHIPPED | OUTPATIENT
Start: 2021-01-01 | End: 2021-01-01

## 2021-01-01 RX ORDER — SPIRONOLACTONE 25 MG/1
12.5 TABLET ORAL DAILY
Status: DISCONTINUED | OUTPATIENT
Start: 2021-01-01 | End: 2021-01-01

## 2021-01-01 RX ORDER — ACETAMINOPHEN 650 MG/1
650 SUPPOSITORY RECTAL EVERY 6 HOURS PRN
Status: DISCONTINUED | OUTPATIENT
Start: 2021-01-01 | End: 2021-01-01 | Stop reason: HOSPADM

## 2021-01-01 RX ORDER — FLUTICASONE FUROATE AND VILANTEROL 100; 25 UG/1; UG/1
1 POWDER RESPIRATORY (INHALATION) DAILY
Status: DISCONTINUED | OUTPATIENT
Start: 2021-01-01 | End: 2021-01-01 | Stop reason: CLARIF

## 2021-01-01 RX ORDER — SENNA PLUS 8.6 MG/1
1 TABLET ORAL NIGHTLY
Status: DISCONTINUED | OUTPATIENT
Start: 2021-01-01 | End: 2021-01-01 | Stop reason: HOSPADM

## 2021-01-01 RX ORDER — SENNA PLUS 8.6 MG/1
TABLET ORAL
Qty: 30 TABLET | Refills: 11 | Status: SHIPPED | OUTPATIENT
Start: 2021-01-01

## 2021-01-01 RX ORDER — MAGNESIUM SULFATE IN WATER 40 MG/ML
2000 INJECTION, SOLUTION INTRAVENOUS PRN
Status: DISCONTINUED | OUTPATIENT
Start: 2021-01-01 | End: 2021-01-01 | Stop reason: HOSPADM

## 2021-01-01 RX ORDER — POTASSIUM CHLORIDE 20 MEQ/1
40 TABLET, EXTENDED RELEASE ORAL ONCE
Status: COMPLETED | OUTPATIENT
Start: 2021-01-01 | End: 2021-01-01

## 2021-01-01 RX ORDER — DIGOXIN 125 MCG
125 TABLET ORAL DAILY
Status: DISCONTINUED | OUTPATIENT
Start: 2021-01-01 | End: 2021-01-01 | Stop reason: HOSPADM

## 2021-01-01 RX ORDER — ALBUMIN, HUMAN INJ 5% 5 %
SOLUTION INTRAVENOUS PRN
Status: DISCONTINUED | OUTPATIENT
Start: 2021-01-01 | End: 2021-01-01 | Stop reason: SDUPTHER

## 2021-01-01 RX ORDER — ALBUTEROL SULFATE 2.5 MG/3ML
2.5 SOLUTION RESPIRATORY (INHALATION) EVERY 6 HOURS PRN
COMMUNITY
End: 2021-01-01

## 2021-01-01 RX ORDER — FUROSEMIDE 20 MG/1
TABLET ORAL
Qty: 30 TABLET | Refills: 5 | Status: SHIPPED
Start: 2021-01-01 | End: 2021-01-01 | Stop reason: DRUGHIGH

## 2021-01-01 RX ORDER — FUROSEMIDE 40 MG/1
40 TABLET ORAL 2 TIMES DAILY
COMMUNITY
End: 2021-01-01 | Stop reason: SDUPTHER

## 2021-01-01 RX ORDER — PREDNISONE 1 MG/1
5 TABLET ORAL DAILY
Status: DISCONTINUED | OUTPATIENT
Start: 2021-01-01 | End: 2021-01-01

## 2021-01-01 RX ORDER — SPIRONOLACTONE 25 MG/1
25 TABLET ORAL DAILY
Status: DISCONTINUED | OUTPATIENT
Start: 2021-01-01 | End: 2021-01-01 | Stop reason: HOSPADM

## 2021-01-01 RX ORDER — POLYETHYLENE GLYCOL 3350 17 G/17G
17 POWDER, FOR SOLUTION ORAL DAILY
Status: DISCONTINUED | OUTPATIENT
Start: 2021-01-01 | End: 2021-01-01 | Stop reason: HOSPADM

## 2021-01-01 RX ORDER — SODIUM CHLORIDE 0.9 % (FLUSH) 0.9 %
10 SYRINGE (ML) INJECTION EVERY 12 HOURS SCHEDULED
Status: DISCONTINUED | OUTPATIENT
Start: 2021-01-01 | End: 2021-01-01 | Stop reason: HOSPADM

## 2021-01-01 RX ORDER — SODIUM CHLORIDE 0.9 % (FLUSH) 0.9 %
10 SYRINGE (ML) INJECTION PRN
Status: CANCELLED | OUTPATIENT
Start: 2021-01-01

## 2021-01-01 RX ORDER — SODIUM CHLORIDE 9 MG/ML
INJECTION, SOLUTION INTRAVENOUS CONTINUOUS
Status: DISCONTINUED | OUTPATIENT
Start: 2021-01-01 | End: 2021-01-01

## 2021-01-01 RX ORDER — ASPIRIN 81 MG/1
81 TABLET ORAL DAILY
Qty: 30 TABLET | Refills: 11 | Status: SHIPPED | OUTPATIENT
Start: 2021-01-01

## 2021-01-01 RX ORDER — FLUTICASONE FUROATE AND VILANTEROL TRIFENATATE 100; 25 UG/1; UG/1
1 POWDER RESPIRATORY (INHALATION) DAILY
Qty: 30 EACH | Status: SHIPPED | OUTPATIENT
Start: 2021-01-01 | End: 2030-07-09

## 2021-01-01 RX ORDER — DIGOXIN 125 MCG
125 TABLET ORAL DAILY
Qty: 30 TABLET | Refills: 5 | Status: SHIPPED | OUTPATIENT
Start: 2021-01-01

## 2021-01-01 RX ORDER — SODIUM CHLORIDE 0.9 % (FLUSH) 0.9 %
5-40 SYRINGE (ML) INJECTION EVERY 12 HOURS SCHEDULED
Status: DISCONTINUED | OUTPATIENT
Start: 2021-01-01 | End: 2021-01-01 | Stop reason: HOSPADM

## 2021-01-01 RX ORDER — VIT C/E/ZN/COPPR/LUTEIN/ZEAXAN 60 MG-6 MG
1 CAPSULE ORAL DAILY
Status: DISCONTINUED | OUTPATIENT
Start: 2021-01-01 | End: 2021-01-01 | Stop reason: HOSPADM

## 2021-01-01 RX ORDER — LANOLIN ALCOHOL/MO/W.PET/CERES
1.5 CREAM (GRAM) TOPICAL NIGHTLY PRN
Status: DISCONTINUED | OUTPATIENT
Start: 2021-01-01 | End: 2021-01-01 | Stop reason: HOSPADM

## 2021-01-01 RX ORDER — DIGOXIN 0.25 MG/ML
250 INJECTION INTRAMUSCULAR; INTRAVENOUS EVERY 6 HOURS
Status: COMPLETED | OUTPATIENT
Start: 2021-01-01 | End: 2021-01-01

## 2021-01-01 RX ORDER — CALCIUM CARBONATE 500(1250)
500 TABLET ORAL 2 TIMES DAILY
Status: DISCONTINUED | OUTPATIENT
Start: 2021-01-01 | End: 2021-01-01 | Stop reason: HOSPADM

## 2021-01-01 RX ORDER — SODIUM CHLORIDE 9 MG/ML
INJECTION, SOLUTION INTRAVENOUS CONTINUOUS PRN
Status: DISCONTINUED | OUTPATIENT
Start: 2021-01-01 | End: 2021-01-01 | Stop reason: SDUPTHER

## 2021-01-01 RX ORDER — SPIRONOLACTONE 25 MG/1
12.5 TABLET ORAL DAILY
Qty: 90 TABLET | Refills: 3 | Status: SHIPPED
Start: 2021-01-01 | End: 2021-01-01

## 2021-01-01 RX ORDER — OXYCODONE HYDROCHLORIDE AND ACETAMINOPHEN 5; 325 MG/1; MG/1
1 TABLET ORAL EVERY 4 HOURS PRN
Status: DISCONTINUED | OUTPATIENT
Start: 2021-01-01 | End: 2021-01-01 | Stop reason: HOSPADM

## 2021-01-01 RX ORDER — UREA 10 %
1 LOTION (ML) TOPICAL NIGHTLY PRN
COMMUNITY

## 2021-01-01 RX ORDER — LIDOCAINE HYDROCHLORIDE 20 MG/ML
INJECTION, SOLUTION INTRAVENOUS PRN
Status: DISCONTINUED | OUTPATIENT
Start: 2021-01-01 | End: 2021-01-01 | Stop reason: SDUPTHER

## 2021-01-01 RX ORDER — ISOSORBIDE MONONITRATE 30 MG/1
30 TABLET, EXTENDED RELEASE ORAL DAILY
Qty: 30 TABLET | Refills: 3 | Status: SHIPPED
Start: 2021-01-01 | End: 2021-01-01

## 2021-01-01 RX ORDER — SODIUM CHLORIDE 0.9 % (FLUSH) 0.9 %
10 SYRINGE (ML) INJECTION PRN
Status: DISCONTINUED | OUTPATIENT
Start: 2021-01-01 | End: 2021-01-01 | Stop reason: HOSPADM

## 2021-01-01 RX ORDER — BUDESONIDE 0.25 MG/2ML
0.25 INHALANT ORAL 2 TIMES DAILY
Status: DISCONTINUED | OUTPATIENT
Start: 2021-01-01 | End: 2021-01-01 | Stop reason: HOSPADM

## 2021-01-01 RX ORDER — GABAPENTIN 100 MG/1
CAPSULE ORAL
Qty: 60 CAPSULE | Refills: 11 | Status: SHIPPED | OUTPATIENT
Start: 2021-01-01 | End: 2022-08-23

## 2021-01-01 RX ORDER — SODIUM CHLORIDE, SODIUM LACTATE, POTASSIUM CHLORIDE, CALCIUM CHLORIDE 600; 310; 30; 20 MG/100ML; MG/100ML; MG/100ML; MG/100ML
INJECTION, SOLUTION INTRAVENOUS CONTINUOUS PRN
Status: DISCONTINUED | OUTPATIENT
Start: 2021-01-01 | End: 2021-01-01 | Stop reason: SDUPTHER

## 2021-01-01 RX ORDER — SODIUM CHLORIDE 9 MG/ML
25 INJECTION, SOLUTION INTRAVENOUS PRN
Status: DISCONTINUED | OUTPATIENT
Start: 2021-01-01 | End: 2021-01-01 | Stop reason: HOSPADM

## 2021-01-01 RX ORDER — FLUTICASONE FUROATE AND VILANTEROL TRIFENATATE 100; 25 UG/1; UG/1
POWDER RESPIRATORY (INHALATION) DAILY
COMMUNITY
End: 2021-01-01 | Stop reason: SDUPTHER

## 2021-01-01 RX ORDER — CHLORHEXIDINE GLUCONATE 0.12 MG/ML
15 RINSE ORAL ONCE
Status: COMPLETED | OUTPATIENT
Start: 2021-01-01 | End: 2021-01-01

## 2021-01-01 RX ORDER — POLYETHYLENE GLYCOL 3350 17 G/17G
17 POWDER, FOR SOLUTION ORAL DAILY PRN
Status: DISCONTINUED | OUTPATIENT
Start: 2021-01-01 | End: 2021-01-01 | Stop reason: HOSPADM

## 2021-01-01 RX ORDER — FUROSEMIDE 10 MG/ML
40 INJECTION INTRAMUSCULAR; INTRAVENOUS ONCE
Status: COMPLETED | OUTPATIENT
Start: 2021-01-01 | End: 2021-01-01

## 2021-01-01 RX ORDER — SODIUM CHLORIDE 9 MG/ML
25 INJECTION, SOLUTION INTRAVENOUS PRN
Status: CANCELLED | OUTPATIENT
Start: 2021-01-01

## 2021-01-01 RX ORDER — DILTIAZEM HYDROCHLORIDE 5 MG/ML
0.25 INJECTION INTRAVENOUS ONCE
Status: DISCONTINUED | OUTPATIENT
Start: 2021-01-01 | End: 2021-01-01

## 2021-01-01 RX ORDER — POTASSIUM CHLORIDE 7.45 MG/ML
10 INJECTION INTRAVENOUS PRN
Status: DISCONTINUED | OUTPATIENT
Start: 2021-01-01 | End: 2021-01-01 | Stop reason: HOSPADM

## 2021-01-01 RX ORDER — PROPOFOL 10 MG/ML
INJECTION, EMULSION INTRAVENOUS PRN
Status: DISCONTINUED | OUTPATIENT
Start: 2021-01-01 | End: 2021-01-01 | Stop reason: SDUPTHER

## 2021-01-01 RX ORDER — FUROSEMIDE 40 MG/1
40 TABLET ORAL 2 TIMES DAILY
Qty: 60 TABLET | Refills: 5 | Status: SHIPPED
Start: 2021-01-01 | End: 2021-01-01 | Stop reason: SDUPTHER

## 2021-01-01 RX ORDER — ASPIRIN 81 MG/1
81 TABLET ORAL DAILY
Status: DISCONTINUED | OUTPATIENT
Start: 2021-01-01 | End: 2021-01-01 | Stop reason: HOSPADM

## 2021-01-01 RX ORDER — POTASSIUM CHLORIDE 29.8 MG/ML
20 INJECTION INTRAVENOUS ONCE
Status: COMPLETED | OUTPATIENT
Start: 2021-01-01 | End: 2021-01-01

## 2021-01-01 RX ORDER — IPRATROPIUM BROMIDE 21 UG/1
SPRAY, METERED NASAL PRN
COMMUNITY
End: 2021-01-01

## 2021-01-01 RX ORDER — FUROSEMIDE 40 MG/1
40 TABLET ORAL DAILY
COMMUNITY
End: 2021-01-01 | Stop reason: SDUPTHER

## 2021-01-01 RX ORDER — CHLORHEXIDINE GLUCONATE 0.12 MG/ML
15 RINSE ORAL ONCE
Status: CANCELLED | OUTPATIENT
Start: 2021-01-01 | End: 2021-01-01

## 2021-01-01 RX ORDER — LANOLIN ALCOHOL/MO/W.PET/CERES
3 CREAM (GRAM) TOPICAL NIGHTLY PRN
Status: DISCONTINUED | OUTPATIENT
Start: 2021-01-01 | End: 2021-01-01 | Stop reason: HOSPADM

## 2021-01-01 RX ORDER — ACETAMINOPHEN 325 MG/1
650 TABLET ORAL EVERY 8 HOURS SCHEDULED
Status: DISCONTINUED | OUTPATIENT
Start: 2021-01-01 | End: 2021-01-01 | Stop reason: HOSPADM

## 2021-01-01 RX ORDER — INDAPAMIDE 2.5 MG/1
2.5 TABLET, FILM COATED ORAL EVERY MORNING
Qty: 30 TABLET | Refills: 3 | Status: SHIPPED
Start: 2021-01-01 | End: 2021-01-01 | Stop reason: ALTCHOICE

## 2021-01-01 RX ORDER — ARFORMOTEROL TARTRATE 15 UG/2ML
15 SOLUTION RESPIRATORY (INHALATION) 2 TIMES DAILY
Status: DISCONTINUED | OUTPATIENT
Start: 2021-01-01 | End: 2021-01-01 | Stop reason: HOSPADM

## 2021-01-01 RX ORDER — CHLORHEXIDINE GLUCONATE 4 G/100ML
SOLUTION TOPICAL ONCE
Status: CANCELLED | OUTPATIENT
Start: 2021-01-01 | End: 2021-01-01

## 2021-01-01 RX ORDER — POTASSIUM CHLORIDE 7.45 MG/ML
10 INJECTION INTRAVENOUS
Status: COMPLETED | OUTPATIENT
Start: 2021-01-01 | End: 2021-01-01

## 2021-01-01 RX ORDER — SODIUM CHLORIDE 0.9 % (FLUSH) 0.9 %
10 SYRINGE (ML) INJECTION EVERY 12 HOURS SCHEDULED
Status: DISCONTINUED | OUTPATIENT
Start: 2021-01-01 | End: 2021-01-01

## 2021-01-01 RX ORDER — FUROSEMIDE 40 MG/1
40 TABLET ORAL DAILY
Qty: 30 TABLET | Refills: 5 | Status: SHIPPED | OUTPATIENT
Start: 2021-01-01

## 2021-01-01 RX ORDER — ECHINACEA 400 MG
1000 CAPSULE ORAL DAILY
Status: DISCONTINUED | OUTPATIENT
Start: 2021-01-01 | End: 2021-01-01 | Stop reason: CLARIF

## 2021-01-01 RX ORDER — SODIUM CHLORIDE 0.9 % (FLUSH) 0.9 %
5-40 SYRINGE (ML) INJECTION PRN
Status: DISCONTINUED | OUTPATIENT
Start: 2021-01-01 | End: 2021-01-01 | Stop reason: HOSPADM

## 2021-01-01 RX ORDER — OXYCODONE HYDROCHLORIDE 10 MG/1
10 TABLET ORAL EVERY 4 HOURS PRN
Qty: 30 TABLET | Refills: 0 | Status: SHIPPED | OUTPATIENT
Start: 2021-01-01 | End: 2021-09-16

## 2021-01-01 RX ORDER — M-VIT,TX,IRON,MINS/CALC/FOLIC 27MG-0.4MG
1 TABLET ORAL DAILY
Status: DISCONTINUED | OUTPATIENT
Start: 2021-01-01 | End: 2021-01-01 | Stop reason: HOSPADM

## 2021-01-01 RX ORDER — CHLORHEXIDINE GLUCONATE 4 G/100ML
SOLUTION TOPICAL ONCE
Status: COMPLETED | OUTPATIENT
Start: 2021-01-01 | End: 2021-01-01

## 2021-01-01 RX ORDER — HEPARIN SODIUM 10000 [USP'U]/ML
INJECTION, SOLUTION INTRAVENOUS; SUBCUTANEOUS PRN
Status: DISCONTINUED | OUTPATIENT
Start: 2021-01-01 | End: 2021-01-01 | Stop reason: SDUPTHER

## 2021-01-01 RX ORDER — LEFLUNOMIDE 20 MG/1
20 TABLET ORAL DAILY
Qty: 30 TABLET | Refills: 2 | Status: SHIPPED
Start: 2021-01-01 | End: 2021-01-01

## 2021-01-01 RX ORDER — AMLODIPINE BESYLATE 5 MG/1
5 TABLET ORAL DAILY
Status: DISCONTINUED | OUTPATIENT
Start: 2021-01-01 | End: 2021-01-01

## 2021-01-01 RX ORDER — CETIRIZINE HYDROCHLORIDE 10 MG/1
10 TABLET ORAL DAILY
Status: DISCONTINUED | OUTPATIENT
Start: 2021-01-01 | End: 2021-01-01 | Stop reason: HOSPADM

## 2021-01-01 RX ORDER — ASPIRIN 81 MG/1
81 TABLET ORAL DAILY
Qty: 30 TABLET | Refills: 3 | Status: SHIPPED | OUTPATIENT
Start: 2021-01-01 | End: 2021-01-01 | Stop reason: SDUPTHER

## 2021-01-01 RX ORDER — BUDESONIDE AND FORMOTEROL FUMARATE DIHYDRATE 80; 4.5 UG/1; UG/1
2 AEROSOL RESPIRATORY (INHALATION) 2 TIMES DAILY
Status: DISCONTINUED | OUTPATIENT
Start: 2021-01-01 | End: 2021-01-01 | Stop reason: CLARIF

## 2021-01-01 RX ORDER — DIGOXIN 125 MCG
125 TABLET ORAL DAILY
COMMUNITY
End: 2021-01-01 | Stop reason: SDUPTHER

## 2021-01-01 RX ORDER — ALBUTEROL SULFATE 90 UG/1
2 AEROSOL, METERED RESPIRATORY (INHALATION) EVERY 4 HOURS PRN
Qty: 1 INHALER | Refills: 3 | Status: SHIPPED | OUTPATIENT
Start: 2021-01-01

## 2021-01-01 RX ORDER — SODIUM CHLORIDE 0.9 % (FLUSH) 0.9 %
10 SYRINGE (ML) INJECTION EVERY 12 HOURS SCHEDULED
Status: CANCELLED | OUTPATIENT
Start: 2021-01-01

## 2021-01-01 RX ORDER — FUROSEMIDE 40 MG/1
40 TABLET ORAL DAILY
Qty: 30 TABLET | Refills: 5 | Status: SHIPPED
Start: 2021-01-01 | End: 2021-01-01

## 2021-01-01 RX ORDER — MENTHOL 40 MG/ML
GEL TOPICAL
COMMUNITY
End: 2021-01-01

## 2021-01-01 RX ORDER — SODIUM CHLORIDE 9 MG/ML
INJECTION, SOLUTION INTRAVENOUS CONTINUOUS
Status: CANCELLED | OUTPATIENT
Start: 2021-01-01

## 2021-01-01 RX ORDER — FUROSEMIDE 20 MG/1
20 TABLET ORAL DAILY
Qty: 90 TABLET | Refills: 3 | Status: SHIPPED
Start: 2021-01-01 | End: 2021-01-01

## 2021-01-01 RX ORDER — DEXTROSE MONOHYDRATE 50 MG/ML
100 INJECTION, SOLUTION INTRAVENOUS PRN
Status: DISCONTINUED | OUTPATIENT
Start: 2021-01-01 | End: 2021-01-01 | Stop reason: HOSPADM

## 2021-01-01 RX ORDER — SODIUM CHLORIDE 9 MG/ML
25 INJECTION, SOLUTION INTRAVENOUS PRN
Status: DISCONTINUED | OUTPATIENT
Start: 2021-01-01 | End: 2021-01-01

## 2021-01-01 RX ORDER — POTASSIUM CHLORIDE 20 MEQ/1
40 TABLET, EXTENDED RELEASE ORAL ONCE
Status: DISCONTINUED | OUTPATIENT
Start: 2021-01-01 | End: 2021-01-01

## 2021-01-01 RX ORDER — FUROSEMIDE 10 MG/ML
40 INJECTION INTRAMUSCULAR; INTRAVENOUS 2 TIMES DAILY
Status: DISCONTINUED | OUTPATIENT
Start: 2021-01-01 | End: 2021-01-01 | Stop reason: HOSPADM

## 2021-01-01 RX ORDER — INDAPAMIDE 2.5 MG/1
2.5 TABLET, FILM COATED ORAL DAILY
COMMUNITY
End: 2021-01-01

## 2021-01-01 RX ORDER — POLYETHYLENE GLYCOL 3350 17 G/17G
17 POWDER, FOR SOLUTION ORAL DAILY
Qty: 510 G | Refills: 11 | Status: SHIPPED | OUTPATIENT
Start: 2021-01-01 | End: 2021-01-01

## 2021-01-01 RX ORDER — FUROSEMIDE 40 MG/1
40 TABLET ORAL 2 TIMES DAILY
Status: DISCONTINUED | OUTPATIENT
Start: 2021-01-01 | End: 2021-01-01

## 2021-01-01 RX ORDER — SPIRONOLACTONE 25 MG/1
12.5 TABLET ORAL DAILY
Status: ON HOLD | COMMUNITY
End: 2021-01-01 | Stop reason: HOSPADM

## 2021-01-01 RX ORDER — SULFAMETHOXAZOLE AND TRIMETHOPRIM 800; 160 MG/1; MG/1
1 TABLET ORAL 2 TIMES DAILY
Qty: 14 TABLET | Refills: 0 | Status: SHIPPED
Start: 2021-01-01 | End: 2021-01-01 | Stop reason: SDUPTHER

## 2021-01-01 RX ORDER — GENTAMICIN SULFATE 80 MG/50ML
80 INJECTION, SOLUTION INTRAVENOUS
Status: COMPLETED | OUTPATIENT
Start: 2021-01-01 | End: 2021-01-01

## 2021-01-01 RX ORDER — POTASSIUM CHLORIDE 29.8 MG/ML
INJECTION INTRAVENOUS
Status: COMPLETED
Start: 2021-01-01 | End: 2021-01-01

## 2021-01-01 RX ORDER — LEFLUNOMIDE 20 MG/1
20 TABLET ORAL DAILY
Status: DISCONTINUED | OUTPATIENT
Start: 2021-01-01 | End: 2021-01-01 | Stop reason: HOSPADM

## 2021-01-01 RX ORDER — BUDESONIDE AND FORMOTEROL FUMARATE DIHYDRATE 80; 4.5 UG/1; UG/1
2 AEROSOL RESPIRATORY (INHALATION) 2 TIMES DAILY
Status: DISCONTINUED | OUTPATIENT
Start: 2021-01-01 | End: 2021-01-01 | Stop reason: HOSPADM

## 2021-01-01 RX ORDER — NITROGLYCERIN 5 MG/ML
INJECTION, SOLUTION INTRAVENOUS PRN
Status: DISCONTINUED | OUTPATIENT
Start: 2021-01-01 | End: 2021-01-01 | Stop reason: SDUPTHER

## 2021-01-01 RX ORDER — SPIRONOLACTONE 25 MG/1
25 TABLET ORAL DAILY
Qty: 30 TABLET | Refills: 3 | Status: SHIPPED | OUTPATIENT
Start: 2021-01-01 | End: 2021-01-01

## 2021-01-01 RX ORDER — FUROSEMIDE 40 MG/1
40 TABLET ORAL 2 TIMES DAILY
Status: DISCONTINUED | OUTPATIENT
Start: 2021-01-01 | End: 2021-01-01 | Stop reason: HOSPADM

## 2021-01-01 RX ORDER — CALCIUM CARBONATE 200(500)MG
500 TABLET,CHEWABLE ORAL 2 TIMES DAILY
Status: DISCONTINUED | OUTPATIENT
Start: 2021-01-01 | End: 2021-01-01 | Stop reason: HOSPADM

## 2021-01-01 RX ORDER — NICOTINE POLACRILEX 4 MG
15 LOZENGE BUCCAL PRN
Status: DISCONTINUED | OUTPATIENT
Start: 2021-01-01 | End: 2021-01-01 | Stop reason: HOSPADM

## 2021-01-01 RX ORDER — FENTANYL CITRATE 50 UG/ML
INJECTION, SOLUTION INTRAMUSCULAR; INTRAVENOUS PRN
Status: DISCONTINUED | OUTPATIENT
Start: 2021-01-01 | End: 2021-01-01 | Stop reason: SDUPTHER

## 2021-01-01 RX ORDER — ONDANSETRON 2 MG/ML
4 INJECTION INTRAMUSCULAR; INTRAVENOUS EVERY 8 HOURS PRN
Status: DISCONTINUED | OUTPATIENT
Start: 2021-01-01 | End: 2021-01-01 | Stop reason: HOSPADM

## 2021-01-01 RX ORDER — POLYETHYLENE GLYCOL 3350 17 G/17G
17 POWDER, FOR SOLUTION ORAL DAILY
COMMUNITY

## 2021-01-01 RX ORDER — PROTAMINE SULFATE 10 MG/ML
INJECTION, SOLUTION INTRAVENOUS PRN
Status: DISCONTINUED | OUTPATIENT
Start: 2021-01-01 | End: 2021-01-01 | Stop reason: SDUPTHER

## 2021-01-01 RX ORDER — CHLORPHENIR/PHENYLEPH/ASPIRIN 2-7.8-325
1 TABLET, EFFERVESCENT ORAL DAILY
Qty: 30 TABLET | Refills: 11 | Status: SHIPPED
Start: 2021-01-01 | End: 2021-01-01

## 2021-01-01 RX ORDER — ACETAMINOPHEN 325 MG/1
650 TABLET ORAL EVERY 6 HOURS PRN
Status: DISCONTINUED | OUTPATIENT
Start: 2021-01-01 | End: 2021-01-01 | Stop reason: HOSPADM

## 2021-01-01 RX ORDER — CHOLECALCIFEROL (VITAMIN D3) 50 MCG
2000 TABLET ORAL DAILY
Status: DISCONTINUED | OUTPATIENT
Start: 2021-01-01 | End: 2021-01-01 | Stop reason: HOSPADM

## 2021-01-01 RX ORDER — ALBUTEROL SULFATE 2.5 MG/3ML
2.5 SOLUTION RESPIRATORY (INHALATION) EVERY 6 HOURS PRN
Qty: 120 EACH | Refills: 3 | Status: SHIPPED
Start: 2021-01-01 | End: 2021-01-01

## 2021-01-01 RX ORDER — FUROSEMIDE 20 MG/1
20 TABLET ORAL DAILY
Status: DISCONTINUED | OUTPATIENT
Start: 2021-01-01 | End: 2021-01-01

## 2021-01-01 RX ORDER — FUROSEMIDE 20 MG/1
40 TABLET ORAL DAILY
Qty: 60 TABLET | Refills: 5 | Status: SHIPPED
Start: 2021-01-01 | End: 2021-01-01

## 2021-01-01 RX ORDER — POTASSIUM CHLORIDE 20 MEQ/1
40 TABLET, EXTENDED RELEASE ORAL
Status: COMPLETED | OUTPATIENT
Start: 2021-01-01 | End: 2021-01-01

## 2021-01-01 RX ORDER — LEFLUNOMIDE 10 MG/1
20 TABLET ORAL DAILY
Status: DISCONTINUED | OUTPATIENT
Start: 2021-01-01 | End: 2021-01-01 | Stop reason: HOSPADM

## 2021-01-01 RX ORDER — DEXTROSE MONOHYDRATE 25 G/50ML
12.5 INJECTION, SOLUTION INTRAVENOUS PRN
Status: DISCONTINUED | OUTPATIENT
Start: 2021-01-01 | End: 2021-01-01 | Stop reason: HOSPADM

## 2021-01-01 RX ORDER — SENNOSIDES 8.6 MG
CAPSULE ORAL
Qty: 180 TABLET | Refills: 0 | Status: SHIPPED
Start: 2021-01-01 | End: 2021-01-01

## 2021-01-01 RX ORDER — SODIUM CHLORIDE 0.9 % (FLUSH) 0.9 %
10 SYRINGE (ML) INJECTION PRN
Status: DISCONTINUED | OUTPATIENT
Start: 2021-01-01 | End: 2021-01-01

## 2021-01-01 RX ORDER — LIDOCAINE HYDROCHLORIDE 10 MG/ML
INJECTION, SOLUTION EPIDURAL; INFILTRATION; INTRACAUDAL; PERINEURAL PRN
Status: DISCONTINUED | OUTPATIENT
Start: 2021-01-01 | End: 2021-01-01 | Stop reason: HOSPADM

## 2021-01-01 RX ADMIN — ASPIRIN 81 MG: 81 TABLET, COATED ORAL at 19:46

## 2021-01-01 RX ADMIN — FUROSEMIDE 40 MG: 10 INJECTION, SOLUTION INTRAMUSCULAR; INTRAVENOUS at 18:38

## 2021-01-01 RX ADMIN — CETIRIZINE HYDROCHLORIDE 10 MG: 10 TABLET, FILM COATED ORAL at 19:46

## 2021-01-01 RX ADMIN — SPIRONOLACTONE 25 MG: 25 TABLET ORAL at 09:19

## 2021-01-01 RX ADMIN — PROPOFOL 75 MCG/KG/MIN: 10 INJECTION, EMULSION INTRAVENOUS at 14:32

## 2021-01-01 RX ADMIN — CALCIUM CARBONATE 500 MG: 500 TABLET, CHEWABLE ORAL at 21:32

## 2021-01-01 RX ADMIN — METOPROLOL TARTRATE 25 MG: 25 TABLET, FILM COATED ORAL at 20:14

## 2021-01-01 RX ADMIN — PREDNISONE 5 MG: 5 TABLET ORAL at 19:47

## 2021-01-01 RX ADMIN — CALCIUM CARBONATE 500 MG: 500 TABLET, CHEWABLE ORAL at 08:54

## 2021-01-01 RX ADMIN — SPIRONOLACTONE 12.5 MG: 25 TABLET ORAL at 07:59

## 2021-01-01 RX ADMIN — ENOXAPARIN SODIUM 60 MG: 60 INJECTION SUBCUTANEOUS at 08:01

## 2021-01-01 RX ADMIN — Medication 10 ML: at 21:33

## 2021-01-01 RX ADMIN — ACETAMINOPHEN 650 MG: 325 TABLET, FILM COATED ORAL at 06:25

## 2021-01-01 RX ADMIN — ENOXAPARIN SODIUM 60 MG: 60 INJECTION SUBCUTANEOUS at 20:57

## 2021-01-01 RX ADMIN — SODIUM CHLORIDE: 9 INJECTION, SOLUTION INTRAVENOUS at 14:24

## 2021-01-01 RX ADMIN — FUROSEMIDE 40 MG: 40 TABLET ORAL at 21:34

## 2021-01-01 RX ADMIN — APIXABAN 2.5 MG: 2.5 TABLET, FILM COATED ORAL at 09:01

## 2021-01-01 RX ADMIN — Medication 10 ML: at 18:38

## 2021-01-01 RX ADMIN — APIXABAN 2.5 MG: 2.5 TABLET, FILM COATED ORAL at 08:54

## 2021-01-01 RX ADMIN — Medication 10 ML: at 20:59

## 2021-01-01 RX ADMIN — METOPROLOL TARTRATE 25 MG: 25 TABLET, FILM COATED ORAL at 20:59

## 2021-01-01 RX ADMIN — METOPROLOL TARTRATE 25 MG: 25 TABLET, FILM COATED ORAL at 09:19

## 2021-01-01 RX ADMIN — LEFLUNOMIDE 20 MG: 10 TABLET ORAL at 07:58

## 2021-01-01 RX ADMIN — ALBUMIN (HUMAN) 25 G: 12.5 INJECTION, SOLUTION INTRAVENOUS at 15:43

## 2021-01-01 RX ADMIN — PREDNISONE 5 MG: 5 TABLET ORAL at 20:12

## 2021-01-01 RX ADMIN — Medication 2000 MG: at 06:24

## 2021-01-01 RX ADMIN — SODIUM CHLORIDE, PRESERVATIVE FREE 10 ML: 5 INJECTION INTRAVENOUS at 10:46

## 2021-01-01 RX ADMIN — BUDESONIDE AND FORMOTEROL FUMARATE DIHYDRATE 2 PUFF: 80; 4.5 AEROSOL RESPIRATORY (INHALATION) at 09:47

## 2021-01-01 RX ADMIN — ASPIRIN 81 MG: 81 TABLET, COATED ORAL at 21:31

## 2021-01-01 RX ADMIN — CALCIUM 500 MG: 500 TABLET ORAL at 20:19

## 2021-01-01 RX ADMIN — FENTANYL CITRATE 25 MCG: 50 INJECTION, SOLUTION INTRAMUSCULAR; INTRAVENOUS at 15:25

## 2021-01-01 RX ADMIN — PREDNISONE 5 MG: 5 TABLET ORAL at 08:00

## 2021-01-01 RX ADMIN — PHENYLEPHRINE HYDROCHLORIDE 100 MCG: 10 INJECTION INTRAVENOUS at 15:28

## 2021-01-01 RX ADMIN — Medication 10 ML: at 16:42

## 2021-01-01 RX ADMIN — BUDESONIDE 250 MCG: 0.25 SUSPENSION RESPIRATORY (INHALATION) at 08:40

## 2021-01-01 RX ADMIN — SENNOSIDES 8.6 MG: 8.6 TABLET, FILM COATED ORAL at 20:14

## 2021-01-01 RX ADMIN — PROTAMINE SULFATE 60 MG: 10 INJECTION, SOLUTION INTRAVENOUS at 15:42

## 2021-01-01 RX ADMIN — FUROSEMIDE 40 MG: 10 INJECTION, SOLUTION INTRAMUSCULAR; INTRAVENOUS at 08:35

## 2021-01-01 RX ADMIN — SODIUM CHLORIDE, POTASSIUM CHLORIDE, SODIUM LACTATE AND CALCIUM CHLORIDE: 600; 310; 30; 20 INJECTION, SOLUTION INTRAVENOUS at 15:53

## 2021-01-01 RX ADMIN — FENTANYL CITRATE 25 MCG: 50 INJECTION, SOLUTION INTRAMUSCULAR; INTRAVENOUS at 14:55

## 2021-01-01 RX ADMIN — APIXABAN 2.5 MG: 2.5 TABLET, FILM COATED ORAL at 08:33

## 2021-01-01 RX ADMIN — 0.12% CHLORHEXIDINE GLUCONATE 15 ML: 1.2 RINSE ORAL at 11:10

## 2021-01-01 RX ADMIN — CETIRIZINE HYDROCHLORIDE 10 MG: 10 TABLET, FILM COATED ORAL at 21:33

## 2021-01-01 RX ADMIN — ENOXAPARIN SODIUM 60 MG: 60 INJECTION SUBCUTANEOUS at 09:49

## 2021-01-01 RX ADMIN — Medication 2000 UNITS: at 19:48

## 2021-01-01 RX ADMIN — PHENYLEPHRINE HYDROCHLORIDE 100 MCG: 10 INJECTION INTRAVENOUS at 15:21

## 2021-01-01 RX ADMIN — FUROSEMIDE 40 MG: 40 TABLET ORAL at 08:33

## 2021-01-01 RX ADMIN — FUROSEMIDE 40 MG: 10 INJECTION, SOLUTION INTRAMUSCULAR; INTRAVENOUS at 09:00

## 2021-01-01 RX ADMIN — ACETAMINOPHEN 650 MG: 325 TABLET, FILM COATED ORAL at 05:57

## 2021-01-01 RX ADMIN — BUDESONIDE AND FORMOTEROL FUMARATE DIHYDRATE 2 PUFF: 80; 4.5 AEROSOL RESPIRATORY (INHALATION) at 09:18

## 2021-01-01 RX ADMIN — Medication 10 ML: at 07:07

## 2021-01-01 RX ADMIN — Medication 10 ML: at 01:21

## 2021-01-01 RX ADMIN — SODIUM CHLORIDE, PRESERVATIVE FREE 10 ML: 5 INJECTION INTRAVENOUS at 19:45

## 2021-01-01 RX ADMIN — LIDOCAINE HYDROCHLORIDE 60 MG: 20 INJECTION, SOLUTION INTRAVENOUS at 14:31

## 2021-01-01 RX ADMIN — Medication 1 TABLET: at 19:47

## 2021-01-01 RX ADMIN — Medication 10 ML: at 10:33

## 2021-01-01 RX ADMIN — POTASSIUM CHLORIDE 40 MEQ: 1500 TABLET, EXTENDED RELEASE ORAL at 09:02

## 2021-01-01 RX ADMIN — BUDESONIDE AND FORMOTEROL FUMARATE DIHYDRATE 2 PUFF: 80; 4.5 AEROSOL RESPIRATORY (INHALATION) at 22:26

## 2021-01-01 RX ADMIN — ENOXAPARIN SODIUM 60 MG: 60 INJECTION SUBCUTANEOUS at 17:53

## 2021-01-01 RX ADMIN — SPIRONOLACTONE 25 MG: 25 TABLET ORAL at 21:33

## 2021-01-01 RX ADMIN — Medication 2000 MG: at 05:57

## 2021-01-01 RX ADMIN — ACETAMINOPHEN 650 MG: 325 TABLET, FILM COATED ORAL at 15:52

## 2021-01-01 RX ADMIN — FUROSEMIDE 40 MG: 40 TABLET ORAL at 08:54

## 2021-01-01 RX ADMIN — BUDESONIDE AND FORMOTEROL FUMARATE DIHYDRATE 2 PUFF: 80; 4.5 AEROSOL RESPIRATORY (INHALATION) at 20:59

## 2021-01-01 RX ADMIN — PHENYLEPHRINE HYDROCHLORIDE 100 MCG: 10 INJECTION INTRAVENOUS at 15:00

## 2021-01-01 RX ADMIN — METOPROLOL TARTRATE 25 MG: 25 TABLET, FILM COATED ORAL at 07:59

## 2021-01-01 RX ADMIN — ACETAMINOPHEN 650 MG: 325 TABLET, FILM COATED ORAL at 20:14

## 2021-01-01 RX ADMIN — FUROSEMIDE 40 MG: 10 INJECTION, SOLUTION INTRAMUSCULAR; INTRAVENOUS at 17:24

## 2021-01-01 RX ADMIN — Medication 1 TABLET: at 20:12

## 2021-01-01 RX ADMIN — ISOSORBIDE MONONITRATE 30 MG: 30 TABLET ORAL at 09:49

## 2021-01-01 RX ADMIN — PHENYLEPHRINE HYDROCHLORIDE 100 MCG: 10 INJECTION INTRAVENOUS at 15:18

## 2021-01-01 RX ADMIN — Medication 10 ML: at 17:36

## 2021-01-01 RX ADMIN — FUROSEMIDE 40 MG: 10 INJECTION, SOLUTION INTRAMUSCULAR; INTRAVENOUS at 17:36

## 2021-01-01 RX ADMIN — POTASSIUM CHLORIDE 20 MEQ: 400 INJECTION, SOLUTION INTRAVENOUS at 22:07

## 2021-01-01 RX ADMIN — DIGOXIN 125 MCG: 125 TABLET ORAL at 20:13

## 2021-01-01 RX ADMIN — DIGOXIN 125 MCG: 125 TABLET ORAL at 21:33

## 2021-01-01 RX ADMIN — APIXABAN 2.5 MG: 2.5 TABLET, FILM COATED ORAL at 21:31

## 2021-01-01 RX ADMIN — Medication 10 ML: at 08:38

## 2021-01-01 RX ADMIN — POTASSIUM CHLORIDE 10 MEQ: 10 INJECTION, SOLUTION INTRAVENOUS at 07:07

## 2021-01-01 RX ADMIN — SODIUM CHLORIDE, PRESERVATIVE FREE 10 ML: 5 INJECTION INTRAVENOUS at 08:56

## 2021-01-01 RX ADMIN — SENNOSIDES 8.6 MG: 8.6 TABLET, FILM COATED ORAL at 21:31

## 2021-01-01 RX ADMIN — FUROSEMIDE 40 MG: 10 INJECTION, SOLUTION INTRAMUSCULAR; INTRAVENOUS at 16:15

## 2021-01-01 RX ADMIN — POLYETHYLENE GLYCOL 3350 17 G: 17 POWDER, FOR SOLUTION ORAL at 19:48

## 2021-01-01 RX ADMIN — SODIUM CHLORIDE, PRESERVATIVE FREE 10 ML: 5 INJECTION INTRAVENOUS at 09:29

## 2021-01-01 RX ADMIN — APIXABAN 2.5 MG: 2.5 TABLET, FILM COATED ORAL at 22:27

## 2021-01-01 RX ADMIN — BUDESONIDE AND FORMOTEROL FUMARATE DIHYDRATE 2 PUFF: 80; 4.5 AEROSOL RESPIRATORY (INHALATION) at 21:32

## 2021-01-01 RX ADMIN — POTASSIUM CHLORIDE 40 MEQ: 1500 TABLET, EXTENDED RELEASE ORAL at 10:54

## 2021-01-01 RX ADMIN — SODIUM CHLORIDE: 9 INJECTION, SOLUTION INTRAVENOUS at 11:10

## 2021-01-01 RX ADMIN — DIGOXIN 250 MCG: 0.25 INJECTION INTRAMUSCULAR; INTRAVENOUS at 15:11

## 2021-01-01 RX ADMIN — FUROSEMIDE 40 MG: 40 TABLET ORAL at 20:13

## 2021-01-01 RX ADMIN — Medication 10 ML: at 08:12

## 2021-01-01 RX ADMIN — Medication 2000 MG: at 21:53

## 2021-01-01 RX ADMIN — ISOSORBIDE MONONITRATE 30 MG: 30 TABLET ORAL at 08:11

## 2021-01-01 RX ADMIN — ACETAMINOPHEN 650 MG: 325 TABLET, FILM COATED ORAL at 13:49

## 2021-01-01 RX ADMIN — CALCIUM 500 MG: 500 TABLET ORAL at 08:00

## 2021-01-01 RX ADMIN — SPIRONOLACTONE 25 MG: 25 TABLET ORAL at 09:01

## 2021-01-01 RX ADMIN — Medication 10 ML: at 09:19

## 2021-01-01 RX ADMIN — FUROSEMIDE 40 MG: 10 INJECTION, SOLUTION INTRAMUSCULAR; INTRAVENOUS at 09:52

## 2021-01-01 RX ADMIN — SODIUM CHLORIDE, PRESERVATIVE FREE 10 ML: 5 INJECTION INTRAVENOUS at 21:23

## 2021-01-01 RX ADMIN — ACETAMINOPHEN 650 MG: 325 TABLET, FILM COATED ORAL at 16:15

## 2021-01-01 RX ADMIN — SPIRONOLACTONE 25 MG: 25 TABLET ORAL at 08:35

## 2021-01-01 RX ADMIN — Medication 1 TABLET: at 21:34

## 2021-01-01 RX ADMIN — BUDESONIDE 250 MCG: 0.25 SUSPENSION RESPIRATORY (INHALATION) at 08:13

## 2021-01-01 RX ADMIN — Medication 2000 MG: at 13:49

## 2021-01-01 RX ADMIN — METOPROLOL TARTRATE 25 MG: 25 TABLET, FILM COATED ORAL at 09:49

## 2021-01-01 RX ADMIN — BUDESONIDE AND FORMOTEROL FUMARATE DIHYDRATE 2 PUFF: 80; 4.5 AEROSOL RESPIRATORY (INHALATION) at 08:12

## 2021-01-01 RX ADMIN — ACETAMINOPHEN 650 MG: 325 TABLET, FILM COATED ORAL at 21:15

## 2021-01-01 RX ADMIN — ASPIRIN 81 MG: 81 TABLET, COATED ORAL at 20:13

## 2021-01-01 RX ADMIN — METOPROLOL TARTRATE 25 MG: 25 TABLET, FILM COATED ORAL at 21:33

## 2021-01-01 RX ADMIN — ARFORMOTEROL TARTRATE 15 MCG: 15 SOLUTION RESPIRATORY (INHALATION) at 19:50

## 2021-01-01 RX ADMIN — Medication 10 ML: at 21:00

## 2021-01-01 RX ADMIN — CALCIUM CARBONATE 500 MG: 500 TABLET, CHEWABLE ORAL at 19:48

## 2021-01-01 RX ADMIN — FAMOTIDINE 20 MG: 10 INJECTION INTRAVENOUS at 08:02

## 2021-01-01 RX ADMIN — POTASSIUM CHLORIDE 40 MEQ: 1500 TABLET, EXTENDED RELEASE ORAL at 12:03

## 2021-01-01 RX ADMIN — IOPAMIDOL 75 ML: 755 INJECTION, SOLUTION INTRAVENOUS at 16:41

## 2021-01-01 RX ADMIN — BUDESONIDE AND FORMOTEROL FUMARATE DIHYDRATE 2 PUFF: 80; 4.5 AEROSOL RESPIRATORY (INHALATION) at 20:57

## 2021-01-01 RX ADMIN — ARFORMOTEROL TARTRATE 15 MCG: 15 SOLUTION RESPIRATORY (INHALATION) at 08:40

## 2021-01-01 RX ADMIN — FUROSEMIDE 40 MG: 10 INJECTION, SOLUTION INTRAMUSCULAR; INTRAVENOUS at 17:59

## 2021-01-01 RX ADMIN — BUDESONIDE AND FORMOTEROL FUMARATE DIHYDRATE 2 PUFF: 80; 4.5 AEROSOL RESPIRATORY (INHALATION) at 08:05

## 2021-01-01 RX ADMIN — PROPOFOL 100 MCG/KG/MIN: 10 INJECTION, EMULSION INTRAVENOUS at 14:28

## 2021-01-01 RX ADMIN — FUROSEMIDE 40 MG: 10 INJECTION, SOLUTION INTRAMUSCULAR; INTRAVENOUS at 18:30

## 2021-01-01 RX ADMIN — PHENYLEPHRINE HYDROCHLORIDE 100 MCG: 10 INJECTION INTRAVENOUS at 15:26

## 2021-01-01 RX ADMIN — SODIUM CHLORIDE, PRESERVATIVE FREE 10 ML: 5 INJECTION INTRAVENOUS at 08:33

## 2021-01-01 RX ADMIN — ISOSORBIDE MONONITRATE 30 MG: 30 TABLET ORAL at 09:01

## 2021-01-01 RX ADMIN — SPIRONOLACTONE 25 MG: 25 TABLET ORAL at 08:11

## 2021-01-01 RX ADMIN — Medication 0.02 MCG/KG/MIN: at 15:05

## 2021-01-01 RX ADMIN — CALCIUM CARBONATE 500 MG: 500 TABLET, CHEWABLE ORAL at 20:13

## 2021-01-01 RX ADMIN — METOPROLOL TARTRATE 25 MG: 25 TABLET, FILM COATED ORAL at 09:10

## 2021-01-01 RX ADMIN — METOPROLOL TARTRATE 25 MG: 25 TABLET, FILM COATED ORAL at 08:33

## 2021-01-01 RX ADMIN — POTASSIUM CHLORIDE 10 MEQ: 10 INJECTION, SOLUTION INTRAVENOUS at 16:14

## 2021-01-01 RX ADMIN — SODIUM CHLORIDE, POTASSIUM CHLORIDE, SODIUM LACTATE AND CALCIUM CHLORIDE: 600; 310; 30; 20 INJECTION, SOLUTION INTRAVENOUS at 14:24

## 2021-01-01 RX ADMIN — Medication 10 ML: at 08:02

## 2021-01-01 RX ADMIN — APIXABAN 2.5 MG: 2.5 TABLET, FILM COATED ORAL at 20:13

## 2021-01-01 RX ADMIN — Medication 2000 UNITS: at 20:12

## 2021-01-01 RX ADMIN — METOPROLOL TARTRATE 25 MG: 25 TABLET, FILM COATED ORAL at 09:06

## 2021-01-01 RX ADMIN — FUROSEMIDE 40 MG: 40 TABLET ORAL at 19:47

## 2021-01-01 RX ADMIN — ACETAMINOPHEN 650 MG: 325 TABLET, FILM COATED ORAL at 21:54

## 2021-01-01 RX ADMIN — APIXABAN 2.5 MG: 2.5 TABLET, FILM COATED ORAL at 20:59

## 2021-01-01 RX ADMIN — SODIUM CHLORIDE, PRESERVATIVE FREE 10 ML: 5 INJECTION INTRAVENOUS at 06:25

## 2021-01-01 RX ADMIN — Medication 1 CAPSULE: at 19:47

## 2021-01-01 RX ADMIN — METOPROLOL TARTRATE 25 MG: 25 TABLET, FILM COATED ORAL at 19:46

## 2021-01-01 RX ADMIN — FUROSEMIDE 40 MG: 10 INJECTION, SOLUTION INTRAMUSCULAR; INTRAVENOUS at 08:12

## 2021-01-01 RX ADMIN — ACETAMINOPHEN 650 MG: 325 TABLET, FILM COATED ORAL at 21:33

## 2021-01-01 RX ADMIN — PHENYLEPHRINE HYDROCHLORIDE 100 MCG: 10 INJECTION INTRAVENOUS at 15:27

## 2021-01-01 RX ADMIN — BUDESONIDE 250 MCG: 0.25 SUSPENSION RESPIRATORY (INHALATION) at 19:50

## 2021-01-01 RX ADMIN — APIXABAN 2.5 MG: 2.5 TABLET, FILM COATED ORAL at 08:11

## 2021-01-01 RX ADMIN — SPIRONOLACTONE 25 MG: 25 TABLET ORAL at 19:47

## 2021-01-01 RX ADMIN — Medication 1 CAPSULE: at 21:31

## 2021-01-01 RX ADMIN — SENNOSIDES 8.6 MG: 8.6 TABLET, FILM COATED ORAL at 19:46

## 2021-01-01 RX ADMIN — ISOSORBIDE MONONITRATE 30 MG: 30 TABLET ORAL at 08:35

## 2021-01-01 RX ADMIN — NITROGLYCERIN 50 MCG: 5 INJECTION, SOLUTION INTRAVENOUS at 15:30

## 2021-01-01 RX ADMIN — PHENYLEPHRINE HYDROCHLORIDE 100 MCG: 10 INJECTION INTRAVENOUS at 14:53

## 2021-01-01 RX ADMIN — BUDESONIDE 250 MCG: 0.25 SUSPENSION RESPIRATORY (INHALATION) at 19:16

## 2021-01-01 RX ADMIN — SODIUM CHLORIDE, PRESERVATIVE FREE 10 ML: 5 INJECTION INTRAVENOUS at 13:50

## 2021-01-01 RX ADMIN — METOPROLOL TARTRATE 25 MG: 25 TABLET, FILM COATED ORAL at 09:29

## 2021-01-01 RX ADMIN — DIGOXIN 250 MCG: 0.25 INJECTION INTRAMUSCULAR; INTRAVENOUS at 09:55

## 2021-01-01 RX ADMIN — ISOSORBIDE MONONITRATE 30 MG: 30 TABLET ORAL at 09:19

## 2021-01-01 RX ADMIN — ISOSORBIDE MONONITRATE 30 MG: 30 TABLET ORAL at 08:00

## 2021-01-01 RX ADMIN — SPIRONOLACTONE 25 MG: 25 TABLET ORAL at 10:46

## 2021-01-01 RX ADMIN — HEPARIN SODIUM 6000 UNITS: 10000 INJECTION INTRAVENOUS; SUBCUTANEOUS at 15:11

## 2021-01-01 RX ADMIN — Medication 1 CAPSULE: at 20:13

## 2021-01-01 RX ADMIN — POTASSIUM CHLORIDE 40 MEQ: 1500 TABLET, EXTENDED RELEASE ORAL at 08:01

## 2021-01-01 RX ADMIN — ACETAMINOPHEN 650 MG: 325 TABLET, FILM COATED ORAL at 06:31

## 2021-01-01 RX ADMIN — BUDESONIDE AND FORMOTEROL FUMARATE DIHYDRATE 2 PUFF: 80; 4.5 AEROSOL RESPIRATORY (INHALATION) at 09:03

## 2021-01-01 RX ADMIN — ENOXAPARIN SODIUM 60 MG: 60 INJECTION SUBCUTANEOUS at 20:19

## 2021-01-01 RX ADMIN — FUROSEMIDE 40 MG: 40 TABLET ORAL at 09:29

## 2021-01-01 RX ADMIN — DIGOXIN 125 MCG: 125 TABLET ORAL at 19:46

## 2021-01-01 RX ADMIN — METOPROLOL TARTRATE 25 MG: 25 TABLET, FILM COATED ORAL at 19:55

## 2021-01-01 RX ADMIN — METOPROLOL TARTRATE 25 MG: 25 TABLET, FILM COATED ORAL at 08:11

## 2021-01-01 RX ADMIN — METOPROLOL TARTRATE 25 MG: 25 TABLET, FILM COATED ORAL at 21:29

## 2021-01-01 RX ADMIN — METOPROLOL TARTRATE 25 MG: 25 TABLET, FILM COATED ORAL at 20:19

## 2021-01-01 RX ADMIN — ENOXAPARIN SODIUM 60 MG: 60 INJECTION SUBCUTANEOUS at 08:35

## 2021-01-01 RX ADMIN — METOPROLOL TARTRATE 25 MG: 25 TABLET, FILM COATED ORAL at 21:54

## 2021-01-01 RX ADMIN — Medication 10 ML: at 20:19

## 2021-01-01 RX ADMIN — POTASSIUM CHLORIDE 10 MEQ: 10 INJECTION, SOLUTION INTRAVENOUS at 01:19

## 2021-01-01 RX ADMIN — APIXABAN 2.5 MG: 2.5 TABLET, FILM COATED ORAL at 21:29

## 2021-01-01 RX ADMIN — APIXABAN 2.5 MG: 2.5 TABLET, FILM COATED ORAL at 09:19

## 2021-01-01 RX ADMIN — ISOSORBIDE MONONITRATE 30 MG: 30 TABLET ORAL at 19:46

## 2021-01-01 RX ADMIN — FUROSEMIDE 40 MG: 10 INJECTION, SOLUTION INTRAMUSCULAR; INTRAVENOUS at 09:55

## 2021-01-01 RX ADMIN — Medication 2000 MG: at 14:34

## 2021-01-01 RX ADMIN — METOPROLOL TARTRATE 25 MG: 25 TABLET, FILM COATED ORAL at 22:27

## 2021-01-01 RX ADMIN — ARFORMOTEROL TARTRATE 15 MCG: 15 SOLUTION RESPIRATORY (INHALATION) at 08:13

## 2021-01-01 RX ADMIN — BUDESONIDE AND FORMOTEROL FUMARATE DIHYDRATE 2 PUFF: 80; 4.5 AEROSOL RESPIRATORY (INHALATION) at 08:38

## 2021-01-01 RX ADMIN — SODIUM CHLORIDE, PRESERVATIVE FREE 10 ML: 5 INJECTION INTRAVENOUS at 21:54

## 2021-01-01 RX ADMIN — ISOSORBIDE MONONITRATE 30 MG: 30 TABLET ORAL at 20:13

## 2021-01-01 RX ADMIN — CALCIUM 500 MG: 500 TABLET ORAL at 08:35

## 2021-01-01 RX ADMIN — CETIRIZINE HYDROCHLORIDE 10 MG: 10 TABLET, FILM COATED ORAL at 20:13

## 2021-01-01 RX ADMIN — CHLORHEXIDINE GLUCONATE: 213 SOLUTION TOPICAL at 10:28

## 2021-01-01 RX ADMIN — POTASSIUM CHLORIDE 20 MEQ: 29.8 INJECTION INTRAVENOUS at 22:07

## 2021-01-01 RX ADMIN — METOPROLOL TARTRATE 25 MG: 25 TABLET, FILM COATED ORAL at 08:35

## 2021-01-01 RX ADMIN — ARFORMOTEROL TARTRATE 15 MCG: 15 SOLUTION RESPIRATORY (INHALATION) at 19:16

## 2021-01-01 RX ADMIN — Medication 1.5 MG: at 23:51

## 2021-01-01 RX ADMIN — SODIUM CHLORIDE, PRESERVATIVE FREE 10 ML: 5 INJECTION INTRAVENOUS at 21:34

## 2021-01-01 RX ADMIN — POTASSIUM CHLORIDE 10 MEQ: 10 INJECTION, SOLUTION INTRAVENOUS at 17:52

## 2021-01-01 RX ADMIN — POTASSIUM CHLORIDE 40 MEQ: 1500 TABLET, EXTENDED RELEASE ORAL at 08:35

## 2021-01-01 RX ADMIN — SPIRONOLACTONE 25 MG: 25 TABLET ORAL at 20:13

## 2021-01-01 RX ADMIN — SODIUM CHLORIDE, PRESERVATIVE FREE 10 ML: 5 INJECTION INTRAVENOUS at 05:57

## 2021-01-01 RX ADMIN — IOPAMIDOL 100 ML: 755 INJECTION, SOLUTION INTRAVENOUS at 13:50

## 2021-01-01 RX ADMIN — Medication 2000 MG: at 21:23

## 2021-01-01 RX ADMIN — METOPROLOL TARTRATE 25 MG: 25 TABLET, FILM COATED ORAL at 20:49

## 2021-01-01 RX ADMIN — POTASSIUM CHLORIDE 40 MEQ: 1500 TABLET, EXTENDED RELEASE ORAL at 17:24

## 2021-01-01 RX ADMIN — Medication 2000 UNITS: at 21:31

## 2021-01-01 RX ADMIN — SENNOSIDES 8.6 MG: 8.6 TABLET, FILM COATED ORAL at 21:54

## 2021-01-01 RX ADMIN — ISOSORBIDE MONONITRATE 30 MG: 30 TABLET ORAL at 21:31

## 2021-01-01 RX ADMIN — FUROSEMIDE 40 MG: 10 INJECTION, SOLUTION INTRAMUSCULAR; INTRAVENOUS at 09:20

## 2021-01-01 RX ADMIN — GENTAMICIN SULFATE 80 MG: 80 INJECTION, SOLUTION INTRAVENOUS at 14:34

## 2021-01-01 RX ADMIN — FENTANYL CITRATE 25 MCG: 50 INJECTION, SOLUTION INTRAMUSCULAR; INTRAVENOUS at 14:49

## 2021-01-01 RX ADMIN — PREDNISONE 5 MG: 5 TABLET ORAL at 21:35

## 2021-01-01 ASSESSMENT — PULMONARY FUNCTION TESTS
PIF_VALUE: 0
PIF_VALUE: 16
PIF_VALUE: 15
PIF_VALUE: 15
PIF_VALUE: 0
PIF_VALUE: 24
PIF_VALUE: 0
PIF_VALUE: 16
PIF_VALUE: 15
PIF_VALUE: 0
PIF_VALUE: 15
PIF_VALUE: 4
PIF_VALUE: 0
PIF_VALUE: 16
PIF_VALUE: 11
PIF_VALUE: 0
PIF_VALUE: 19
PIF_VALUE: 15
PIF_VALUE: 0
PIF_VALUE: 16
PIF_VALUE: 0
PIF_VALUE: 1
PIF_VALUE: 0
PIF_VALUE: 15
PIF_VALUE: 16
PIF_VALUE: 0
PIF_VALUE: 11
PIF_VALUE: 7
PIF_VALUE: 0
PIF_VALUE: 16
PIF_VALUE: 8
PIF_VALUE: 0
PIF_VALUE: 0
PIF_VALUE: 1
PIF_VALUE: 0
PIF_VALUE: 1
PIF_VALUE: 15
PIF_VALUE: 1
PIF_VALUE: 0
PIF_VALUE: 1
PIF_VALUE: 10
PIF_VALUE: 5
PIF_VALUE: 6
PIF_VALUE: 15
PIF_VALUE: 15
PIF_VALUE: 0
PIF_VALUE: 0
PIF_VALUE: 15
PIF_VALUE: 0
PIF_VALUE: 2

## 2021-01-01 ASSESSMENT — ENCOUNTER SYMPTOMS
DIARRHEA: 0
VOICE CHANGE: 0
EYE ITCHING: 0
CHOKING: 0
EYE DISCHARGE: 0
TROUBLE SWALLOWING: 0
BACK PAIN: 0
ABDOMINAL PAIN: 0
SINUS PRESSURE: 0
SINUS PAIN: 0
WHEEZING: 0
COLOR CHANGE: 0
COUGH: 0
SHORTNESS OF BREATH: 1
CONSTIPATION: 0
SORE THROAT: 0
RHINORRHEA: 0

## 2021-01-01 ASSESSMENT — PAIN SCALES - GENERAL
PAINLEVEL_OUTOF10: 0
PAINLEVEL_OUTOF10: 2
PAINLEVEL_OUTOF10: 0
PAINLEVEL_OUTOF10: 5
PAINLEVEL_OUTOF10: 3
PAINLEVEL_OUTOF10: 0
PAINLEVEL_OUTOF10: 0
PAINLEVEL_OUTOF10: 5
PAINLEVEL_OUTOF10: 0
PAINLEVEL_OUTOF10: 5
PAINLEVEL_OUTOF10: 0

## 2021-01-01 ASSESSMENT — PAIN DESCRIPTION - DESCRIPTORS: DESCRIPTORS: ACHING

## 2021-01-01 ASSESSMENT — PAIN DESCRIPTION - LOCATION: LOCATION: GENERALIZED

## 2021-01-01 ASSESSMENT — PAIN DESCRIPTION - PAIN TYPE: TYPE: CHRONIC PAIN

## 2021-01-29 NOTE — PROGRESS NOTES
CHIEF COMPLAINT: CHF/Murmur/SOB/COPD/HTN/PVD    HISTORY OF PRESENT ILLNESS: Patient is a 80 y.o. female seen at the request of Peng Goode MD.      Patient presenting to establish. Recent issues with volume overload for which she was put on diuretics. She has lost 20 pounds.      On oxygen now (3 LPM)    Past Medical History:   Diagnosis Date    Arthritis     Asthma     Calf DVT (deep venous thrombosis) ([facility]) rt leg    1998    Hemorrhoids, internal     Hx of blood clots     Hypertension     Polymyalgia ([facility])     PONV (postoperative nausea and vomiting)     Renal insufficiency     Rosacea keratitis     Shingles     Sleep apnea 5/2014    bi-pap @ night    UTI (lower urinary tract infection) 06/12/2013       Patient Active Problem List   Diagnosis    Cataract, nuclear    Senile osteoporosis    Closed fracture of distal end of radius    Left wrist pain    Post-traumatic osteoarthritis of both hips    DDD (degenerative disc disease), lumbar    History of compression fracture of spine    Decreased bone density       Allergies   Allergen Reactions    Cardizem [Diltiazem Hcl] Rash    Nsaids Other (See Comments)     Kidney insufficiency    Tape [Adhesive Tape] Rash       Current Outpatient Medications   Medication Sig Dispense Refill    spironolactone (ALDACTONE) 25 MG tablet Take 0.5 tablets by mouth daily 90 tablet 3    metoprolol tartrate (LOPRESSOR) 25 MG tablet Take 1 tablet by mouth 2 times daily 60 tablet 11    furosemide (LASIX) 20 MG tablet One daily (Patient taking differently: 20 mg One daily) 30 tablet 5    isosorbide mononitrate (IMDUR) 30 MG extended release tablet Take 1 tablet by mouth daily 30 tablet 3    albuterol (PROVENTIL) (2.5 MG/3ML) 0.083% nebulizer solution Take 3 mLs by nebulization every 6 hours as needed for Wheezing 120 each 3    Multiple Vitamins-Minerals (OCUVITE ADULT 50+) CAPS Take 1 capsule by mouth daily 30 capsule 11    albuterol sulfate HFA Smoker    Smokeless tobacco: Never Used   Substance and Sexual Activity    Alcohol use: No    Drug use: No    Sexual activity: Not on file   Lifestyle    Physical activity     Days per week: Not on file     Minutes per session: Not on file    Stress: Not on file   Relationships    Social connections     Talks on phone: Not on file     Gets together: Not on file     Attends Adventism service: Not on file     Active member of club or organization: Not on file     Attends meetings of clubs or organizations: Not on file     Relationship status: Not on file    Intimate partner violence     Fear of current or ex partner: Not on file     Emotionally abused: Not on file     Physically abused: Not on file     Forced sexual activity: Not on file   Other Topics Concern    Not on file   Social History Narrative    Not on file       Family History   Problem Relation Age of Onset    Heart Disease Mother     Kidney Disease Father     Heart Disease Sister     Cancer Brother      Review of Systems:  Heart: as above   Lungs: as above   Eyes: denies changes in vision or discharge. Ears: denies changes in hearing or pain. Nose: denies epistaxis or masses   Throat: denies sore throat or trouble swallowing. Neuro: denies numbness, tingling, tremors. Skin: denies rashes or itching. : denies hematuria, dysuria   GI: denies vomiting, diarrhea   Psych: denies mood changed, anxiety, depression. All other systems negative. Physical Exam   BP (!) 88/52   Pulse 79   Resp 14   Ht 5' (1.524 m)   Wt 133 lb 3.2 oz (60.4 kg)   BMI 26.01 kg/m²   Constitutional: Oriented to person, place, and time. Well-developed and well-nourished. No distress. Head: Normocephalic and atraumatic. Eyes: EOM are normal. Pupils are equal, round, and reactive to light. Neck: Normal range of motion. Neck supple. No hepatojugular reflux and no JVD present. Carotid bruit is not present. No tracheal deviation present.  No thyromegaly present. Cardiovascular: Normal rate, regular rhythm, VANDA 2/6. Pulmonary/Chest: Effort normal and breath sounds normal. No respiratory distress. No wheezes. No rales. No tenderness. Abdominal: Soft. Bowel sounds are normal. No distension and no mass. No tenderness. No rebound and no guarding. Musculoskeletal: Normal range of motion. No edema and no tenderness. Lymphadenopathy:   No cervical adenopathy. No groin adenopathy. Neurological: Alert and oriented to person, place, and time. Skin: Skin is warm and dry. No rash noted. Not diaphoretic. No erythema. Psychiatric: Normal mood and affect. Behavior is normal.     EKG personally reviewed 01/29/21:  normal EKG, normal sinus rhythm. ASSESSMENT AND PLAN:  Patient Active Problem List   Diagnosis    Cataract, nuclear    Senile osteoporosis    Closed fracture of distal end of radius    Left wrist pain    Post-traumatic osteoarthritis of both hips    DDD (degenerative disc disease), lumbar    History of compression fracture of spine    Decreased bone density     1. CHF: Echo. Continue lasix/ARB/BB. Start aldactone 12.5 mg daily. BMP 1 week. 2. Murmur: Echo. Osvaldo Motley D.O.   Cardiologist  Cardiology, [facility]

## 2021-02-08 NOTE — TELEPHONE ENCOUNTER
Stop aldactone. Potassium a bit above normal with it. Izabela Youngblood D.O.   Cardiologist  Cardiology, [address] [facility]

## 2021-02-10 NOTE — TELEPHONE ENCOUNTER
PCP ordered to hold diovan 40mg for a week on 2/1 due to low bp:  110/60 108/64 102/64 100/60 100/58 100/56  Patient's bp off Diovan is 120/60 so pcp d/c [place], please advise

## 2021-02-10 NOTE — TELEPHONE ENCOUNTER
Hold aldactone and diovan and update us in 1 week. Orin Porter D.O.   Cardiologist  Cardiology, [address] [facility]

## 2021-02-17 NOTE — TELEPHONE ENCOUNTER
Patient was instructed to hold aldactone and diovan a week ago and call today with an update, her bp over the last week:  120/82  122/64  122/62  110/60  122/60  124/68  Nurse is asking if they can d/c bp check

## 2021-02-18 NOTE — TELEPHONE ENCOUNTER
Continue to hold and observe. BP's good. Ericka Montana D.O.   Cardiologist  Cardiology, [address]

## 2021-02-23 NOTE — TELEPHONE ENCOUNTER
Patient gets weighed m-w-f, her weight was 132lbs on 2/8, she keeps gaining weight and  on 2/22 it was 140lbs. PCP was notified and he restarted Lozol 2.5mg daily, pt.  Is scheduled for an OV on 2/25, please advise

## 2021-02-25 NOTE — PATIENT INSTRUCTIONS
Make lasix 20 mg daily.  Increase to 40 mg if weight is 138 or more (dry or goal weight is 135 pounds)

## 2021-02-25 NOTE — PROGRESS NOTES
CHIEF COMPLAINT: CHF/Murmur/SOB/COPD/HTN/PVD    HISTORY OF PRESENT ILLNESS: Patient is a 80 y.o. female seen at the request of Yadi Brandt MD.      Patient presenting in follow up. Recent issues with volume overload for which she was put on diuretics. She has lost 20 pounds.      On oxygen now (3 LPM)    Past Medical History:   Diagnosis Date    Arthritis     Asthma     Calf DVT (deep venous thrombosis) ([facility]) rt leg    1998    Hemorrhoids, internal     Hx of blood clots     Hypertension     Polymyalgia ([facility])     PONV (postoperative nausea and vomiting)     Renal insufficiency     Rosacea keratitis     Shingles     Sleep apnea 5/2014    bi-pap @ night    UTI (lower urinary tract infection) 06/12/2013       Patient Active Problem List   Diagnosis    Cataract, nuclear    Senile osteoporosis    Closed fracture of distal end of radius    Left wrist pain    Post-traumatic osteoarthritis of both hips    DDD (degenerative disc disease), lumbar    History of compression fracture of spine    Decreased bone density       Allergies   Allergen Reactions    Cardizem [Diltiazem Hcl] Rash    Nsaids Other (See Comments)     Kidney insufficiency    Tape [Adhesive Tape] Rash       Current Outpatient Medications   Medication Sig Dispense Refill    furosemide (LASIX) 20 MG tablet Take 1 tablet by mouth daily 90 tablet 3    metoprolol tartrate (LOPRESSOR) 25 MG tablet Take 1 tablet by mouth 2 times daily 60 tablet 11    isosorbide mononitrate (IMDUR) 30 MG extended release tablet Take 1 tablet by mouth daily 30 tablet 3    albuterol (PROVENTIL) (2.5 MG/3ML) 0.083% nebulizer solution Take 3 mLs by nebulization every 6 hours as needed for Wheezing 120 each 3    Multiple Vitamins-Minerals (OCUVITE ADULT 50+) CAPS Take 1 capsule by mouth daily 30 capsule 11    albuterol sulfate HFA (VENTOLIN HFA) 108 (90 Base) MCG/ACT inhaler Inhale 2 puffs into the lungs 4 times daily as needed for Wheezing 1 Inhaler 5  predniSONE (DANNA) 5 MG TBEC One pill daily at bedtime with food 30 tablet 5    melatonin 1 MG tablet Take 1 tablet by mouth nightly as needed for Sleep 30 tablet 11    loratadine (CLARITIN) 10 MG tablet One daily at bedtime 30 tablet 11    calcium carbonate (CALCIUM 600) 600 MG TABS tablet Take 1 tablet by mouth 2 times daily 30 tablet 11    vitamin D (CHOLECALCIFEROL) 50 MCG (2000 UT) TABS tablet Take 1 tablet by mouth daily 30 tablet 11    Wheat Dextrin (BENEFIBER) POWD One-half teaspoon with breakfast daily 1 Can 11    acetaminophen (TYLENOL) 650 MG extended release tablet Two tablets every 8 hours 180 tablet 5    zinc oxide (BOUDREAUXS BUTT PASTE) 40 % ointment Apply topically as needed. 1 Tube 2    Flaxseed, Linseed, (FLAXSEED OIL PO) Take 1,000 mg by mouth      leflunomide (ARAVA) 20 MG tablet Take 20 mg by mouth daily      zoledronic acid (RECLAST) 5 MG/100ML SOLN Infuse 5 mg intravenously once Yearly      predniSONE (DELTASONE) 5 MG tablet Take 2.5 mg by mouth daily       hydroxychloroquine (PLAQUENIL) 200 MG tablet Take 200 mg by mouth daily       therapeutic multivitamin-minerals (THERAGRAN-M) tablet Take 1 tablet by mouth daily. No current facility-administered medications for this visit.         Social History     Socioeconomic History    Marital status: Single     Spouse name: Not on file    Number of children: Not on file    Years of education: Not on file    Highest education level: Not on file   Occupational History    Not on file   Social Needs    Financial resource strain: Not on file    Food insecurity     Worry: Not on file     Inability: Not on file    Transportation needs     Medical: Not on file     Non-medical: Not on file   Tobacco Use    Smoking status: Never Smoker    Smokeless tobacco: Never Used   Substance and Sexual Activity    Alcohol use: No    Drug use: No    Sexual activity: Not on file   Lifestyle    Physical activity     Days per week: Not on file     Minutes per session: Not on file    Stress: Not on file   Relationships    Social connections     Talks on phone: Not on file     Gets together: Not on file     Attends Yarsanism service: Not on file     Active member of club or organization: Not on file     Attends meetings of clubs or organizations: Not on file     Relationship status: Not on file    Intimate partner violence     Fear of current or ex partner: Not on file     Emotionally abused: Not on file     Physically abused: Not on file     Forced sexual activity: Not on file   Other Topics Concern    Not on file   Social History Narrative    Not on file       Family History   Problem Relation Age of Onset    Heart Disease Mother     Kidney Disease Father     Heart Disease Sister     Cancer Brother      Review of Systems:  Heart: as above   Lungs: as above   Eyes: denies changes in vision or discharge. Ears: denies changes in hearing or pain. Nose: denies epistaxis or masses   Throat: denies sore throat or trouble swallowing. Neuro: denies numbness, tingling, tremors. Skin: denies rashes or itching. : denies hematuria, dysuria   GI: denies vomiting, diarrhea   Psych: denies mood changed, anxiety, depression. All other systems negative. Physical Exam   /70   Pulse 87   Resp 18   Ht 5' 1\" (1.549 m)   Wt 134 lb (60.8 kg)   SpO2 90%   BMI 25.32 kg/m²   Constitutional: Oriented to person, place, and time. Well-developed and well-nourished. No distress. Head: Normocephalic and atraumatic. Eyes: EOM are normal. Pupils are equal, round, and reactive to light. Neck: Normal range of motion. Neck supple. No hepatojugular reflux and no JVD present. Carotid bruit is not present. No tracheal deviation present. No thyromegaly present. Cardiovascular: Normal rate, regular rhythm, VANDA 2/6. Pulmonary/Chest: Effort normal and breath sounds normal. No respiratory distress. No wheezes. No rales. No tenderness.

## 2021-03-12 NOTE — TELEPHONE ENCOUNTER
Nurse at [facility] was notified, lab order faxed and an appt. Is scheduled for next week.

## 2021-03-12 NOTE — TELEPHONE ENCOUNTER
A fax from nurse states that patient is having SOB and very tired, lungs are clear, weight has been going up: it was 135.6 on 3/1 and today it is 142.2   pt. Is on lasix 20mg and instructed on 2/25 to increase to 40mg if weight is 138 or more.  Lozol was d/c on 2/23 and according to nurse previously they were not able to bring down weight without it, please advise

## 2021-03-12 NOTE — TELEPHONE ENCOUNTER
Take lasix 40 mg daily. BMP Monday. OV next week. Juanita Yang D.O.   Cardiologist  Cardiology, [address] [facility]

## 2021-03-15 NOTE — TELEPHONE ENCOUNTER
Make lasix 40 mg BID until visit. Ana Cristina Godinez D.O.   Cardiologist  Cardiology, [address] [facility]

## 2021-03-15 NOTE — TELEPHONE ENCOUNTER
Bmp in chart, patient's weight today 140.6lb (down 2lbs from Friday)  Patient was having SOB over the weekend,pulse ox 88% on 2 literes, oxygen was increased from 2 to 3liters

## 2021-03-16 NOTE — TELEPHONE ENCOUNTER
Continue BID lasix. Update us on Thursday.    Michael Brown D.O.   Cardiologist  Cardiology, [facility]

## 2021-03-23 PROBLEM — J90 PLEURAL EFFUSION: Status: RESOLVED | Noted: 2021-01-01 | Resolved: 2021-01-01

## 2021-03-23 PROBLEM — J96.01 ACUTE RESPIRATORY FAILURE WITH HYPOXIA AND HYPERCAPNIA (HCC): Status: ACTIVE | Noted: 2021-01-01

## 2021-03-23 PROBLEM — J96.02 ACUTE RESPIRATORY FAILURE WITH HYPOXIA AND HYPERCAPNIA (HCC): Status: ACTIVE | Noted: 2021-01-01

## 2021-03-23 PROBLEM — I48.91 ATRIAL FIBRILLATION WITH RAPID VENTRICULAR RESPONSE (HCC): Status: ACTIVE | Noted: 2021-01-01

## 2021-03-23 PROBLEM — R79.89 ELEVATED BRAIN NATRIURETIC PEPTIDE (BNP) LEVEL: Status: ACTIVE | Noted: 2021-01-01

## 2021-03-23 PROBLEM — J90 PLEURAL EFFUSION: Status: ACTIVE | Noted: 2021-01-01

## 2021-03-23 PROBLEM — I82.90 THROMBUS: Status: ACTIVE | Noted: 2021-01-01

## 2021-03-23 NOTE — ED NOTES
Bed: 18A-18  Expected date:   Expected time:   Means of arrival:   Comments:  Steven Perez RN  03/23/21 [id number]

## 2021-03-23 NOTE — H&P
[facility] Resident Inpatient  History and Physical      CC: Shortness of Breath (per ems patient complains of sob at nursing facility, patient found to be tachycardic and irregular heart rhythm by NH staff, patient arrived with no oxygen on, pulse ox 79% on room air but normally on 3L via NC. ) and Tachycardia      HPI:  History obtained from patient, EMR. This is an 70-year-old woman with a past medical history of COPD, DVT, CHRISTOPHER, osteoporosis, polymyalgia, HTN who presents with a 1 week history of worsening SOB. She resides at the [facility]. SOB is worsened with getting up, movement, going to the bathroom, and has difficulty lying flat because of the shortness of breath. EMS was contacted has her SOB worsened, as per the EMS, patient was found to have an irregular heart rhythm by the NH staff and she arrived with no supplemental oxygen on, with pulse ox 79% and was tachycardic. Associated symptoms or swelling in her legs, feels that her right leg has been chronically larger than her left leg is wearing compression stockings at the NH. She denies feeling that her heart is racing, denies cough, congestion, sore throat, chest pain, fevers, chills, or abdominal pain. Patient follows cardiology, Dr. Chrystal Santos, for CHF, volume overload, murmur. Jan 2021 clinic note, states she had lost 20 pounds on p.o. diuretic, is also to be on an ARB/B-Blocker. Echocardiogram was ordered for the patient, has not been done as of yet. She states she is on 3 L NC at the facility, is unsure if it is for her COPD, but has been on it for only a few months. ED Course: The patient remained unchanged. Labs Hb: 11.4, K: 3.3, pro-BNP: 2,755, pH: 7.404,   Imaging was CTA Chest: thrombus (non-occlusive) left lower lobe, moderate bilateral pleural effusions, hiatal hernia, cardiomegaly, CXR: bilateral opacities, pneumonia, atelectasis  EKG:  Afib with RVR, rate: 102 BPM  Patient was given 40 mg lasix,  ipratropium (ATROVENT) 0.03 % nasal spray by Each Nostril route as needed for Rhinitis Use as directed as needed      Menthol, Topical Analgesic, (BIOFREEZE) 4 % GEL Apply topically Apply topically to affected area as directed.  leflunomide (ARAVA) 20 MG tablet Take 1 tablet by mouth daily 30 tablet 2    metoprolol tartrate (LOPRESSOR) 25 MG tablet Take 1 tablet by mouth 2 times daily 60 tablet 11    isosorbide mononitrate (IMDUR) 30 MG extended release tablet Take 1 tablet by mouth daily 30 tablet 3    albuterol (PROVENTIL) (2.5 MG/3ML) 0.083% nebulizer solution Take 3 mLs by nebulization every 6 hours as needed for Wheezing 120 each 3    Multiple Vitamins-Minerals (OCUVITE ADULT 50+) CAPS Take 1 capsule by mouth daily 30 capsule 11    albuterol sulfate HFA (VENTOLIN HFA) 108 (90 Base) MCG/ACT inhaler Inhale 2 puffs into the lungs 4 times daily as needed for Wheezing 1 Inhaler 5    predniSONE (DANNA) 5 MG TBEC One pill daily at bedtime with food 30 tablet 5    melatonin 1 MG tablet Take 1 tablet by mouth nightly as needed for Sleep 30 tablet 11    loratadine (CLARITIN) 10 MG tablet One daily at bedtime 30 tablet 11    calcium carbonate (CALCIUM 600) 600 MG TABS tablet Take 1 tablet by mouth 2 times daily 30 tablet 11    vitamin D (CHOLECALCIFEROL) 50 MCG (2000 UT) TABS tablet Take 1 tablet by mouth daily 30 tablet 11    Wheat Dextrin (BENEFIBER) POWD One-half teaspoon with breakfast daily 1 Can 11    acetaminophen (TYLENOL) 650 MG extended release tablet Two tablets every 8 hours 180 tablet 5    zinc oxide (BOUDREAUXS BUTT PASTE) 40 % ointment Apply topically as needed.  1 Tube 2    Flaxseed, Linseed, (FLAXSEED OIL PO) Take 1,000 mg by mouth      zoledronic acid (RECLAST) 5 MG/100ML SOLN Infuse 5 mg intravenously once Yearly      predniSONE (DELTASONE) 5 MG tablet Take 5 mg by mouth daily       hydroxychloroquine (PLAQUENIL) 200 MG tablet Take 200 mg by mouth daily       therapeutic multivitamin-minerals (THERAGRAN-M) tablet Take 1 tablet by mouth daily. ROS:     Review of Systems   Constitutional: Positive for fatigue. Negative for activity change, appetite change and chills. HENT: Negative for congestion, rhinorrhea, sinus pressure, sinus pain, sneezing, sore throat, trouble swallowing and voice change. Eyes: Negative for discharge and itching. Respiratory: Positive for shortness of breath. Negative for cough, choking and wheezing. Cardiovascular: Positive for leg swelling. Negative for chest pain and palpitations. Gastrointestinal: Negative for abdominal pain, constipation and diarrhea. Genitourinary: Negative for difficulty urinating and dysuria. Musculoskeletal: Negative for arthralgias and back pain. Skin: Negative for color change and rash. Neurological: Negative for syncope, weakness and light-headedness. Psychiatric/Behavioral: Negative for agitation and confusion. The patient is not nervous/anxious. PE:  Blood pressure 124/67, pulse 72, temperature 97.3 °F (36.3 °C), resp. rate 20, height 5' 1\" (1.549 m), weight 134 lb (60.8 kg), SpO2 (!) 79 %, not currently breastfeeding. Physical Exam   Constitutional: She is oriented to person, place, and time. She appears well-nourished. No distress. HENT:   Head: Normocephalic and atraumatic. Eyes: EOM are normal.   Neck: Neck supple. Pulmonary/Chest: Effort normal. No accessory muscle usage. She has no wheezes. She has no rales. Decreased breath sounds lower lobes. Scattered wheezes   Abdominal: Soft. Bowel sounds are normal. She exhibits no distension. There is no abdominal tenderness. Musculoskeletal:      Comments: Trace edema bilateral LE up to knees. Compression stockings in place. Calf size diameter equal both legs. No erythema, tenderness. Negative Margy sign   Lymphadenopathy:     She has no cervical adenopathy. Neurological: She is alert and oriented to person, place, and time. O2 Content 16.0 mL/dL    HHb 2.7 0.0 - 5.0 %    tHb (est) 11.8 11.5 - 16.5 g/dL    Mode NC- 3 L     Date Of Collection      Time Collected      Pt Temp 37.0 C     ID [id number]     Lab [id number]     Critical(s) Notified . No Critical Values    Magnesium   Result Value Ref Range    Magnesium 2.0 1.6 - 2.6 mg/dL   EKG 12 Lead   Result Value Ref Range    Ventricular Rate 102 BPM    Atrial Rate 441 BPM    QRS Duration 92 ms    Q-T Interval 336 ms    QTc Calculation (Bazett) 437 ms    R Axis 53 degrees    T Axis -102 degrees       Imaging:  Xr Chest Portable    Result Date: 3/23/2021  EXAMINATION: ONE XRAY VIEW OF THE CHEST 3/23/2021 2:23 pm COMPARISON: September 16, 2006 HISTORY: ORDERING SYSTEM PROVIDED HISTORY: Shortness of breath TECHNOLOGIST PROVIDED HISTORY: Reason for exam:->Shortness of breath What reading provider will be dictating this exam?->CRC FINDINGS: Opacities are present in perihilar locations in lung bases silhouetting the hemidiaphragms and right and left heart border. Chronic interstitial changes are present bilaterally. There is biapical scarring. No evidence of pneumothorax. Bilateral opacities notable in the lung bases suggestive of pneumonia, atelectasis, and likely bilateral pleural effusions.        Assessment and Plan  Active Problems:    Acute respiratory failure with hypoxia and hypercapnia (HCC)    Thrombus- left lower lobe    Pleural effusion    Atrial fibrillation with rapid ventricular response (HCC)    Elevated brain natriuretic peptide (BNP) level    Senile osteoporosis    History of compression fracture of spine    COPD (chronic obstructive pulmonary disease) (HCC)  Resolved Problems:    Pleural effusion- bilateral    Acute Hypoxic respiratory failure likely 2/2 Pleural effusions vs. Thrombus vs. CHF exacerbation  Presented RA <90% o2 saturation, breathing on RA  Pro-BNP: 2,755  CXR: bilateral pleural effusions; 40 mg Lasix IV given  CTA: thrombus non-occlusive; lovenox 60 mg given in ED  Diurese with 20 mg lasix will monitor I&Os, BMP qd  Cardiology consulted; is on ARB/B-blocker/lasix at [address] consulted; plan for lovenox to resume for now    Non-occlusive thrombus 1st order branch vessel left lower lobe  CTA revealed thrombus in ER  Presented 1 week hx of SOB  1 dose lovenox 60 mg given in ER  Pulmonology consulted  Supplemental oxygen and c/w monitoring oxygen status    Bilateral Pleural Effusions likely 2/2 CHF exacerbation vs. Thrombus vs. PNA  Lasix PO 20 mg  AC with lovenox  Pulmonology consulted  PNA w/up- procalcitonin, viral panel, urine legionella, urine strep pneumo  Held on ABx  Continue to monitor oxygen status, wean as tolerated    Afib with RVR  Given metoprolol 25 mg in ER  Admit to telemetry  Continue to monitor, consider diltiazem, EP consult    CHF   Pro-BNP: 2,755  Home CHF medications; b-blocker (held), ARB, did not tolerate aldactone was started on it, lasix 20 mg- increase 40 mg if weight >138 lbs  Cardiology consulted  Strict I&Os     COPD   Breo-Ellipta continued  PRN albuterol      DVT / GI prophylaxis: lovenox 40mg SC daily, lovenox 30mg SC daily for CrCl <30, heparin 5000U SC q8 and PCDs and GI prophylaxis not indicated, Protonix and Pepcid    Dispo - Telemetry      Electronically signed by Tiffanie Antony MD on 3/23/21 at 3:51 PM EDT  This case was discussed with attending physician: Dr. Triny Decker

## 2021-03-23 NOTE — TELEPHONE ENCOUNTER
Nurse states that patient is having irregular heart beat, she is asking if patient should be taken to the ER, bp is 110/60 pulse 118 Res. 22 and pulse Ox93% with 3.5liters of oxygen.   Patient is having a lot of SOB even as she speaks she struggles with breathing, please advise

## 2021-03-23 NOTE — ED PROVIDER NOTES
Department of Emergency Medicine   ED  Provider Note  Admit Date/RoomTime: 3/23/2021  1:43 PM  ED Room: 4506/4506-B          History of Present Illness:  3/23/21, Time: 1:57 PM EDT  Chief Complaint   Patient presents with    Shortness of Breath     per ems patient complains of sob at nursing facility, patient found to be tachycardic and irregular heart rhythm by NH staff, patient arrived with no oxygen on, pulse ox 79% on room air but normally on 3L via NC.  Tachycardia            Mihaela Romo is a 80 y.o. female presenting to the ED for shortness of breath, beginning several days ago. The complaint has been persistent, moderate in severity, and worsened by nothing. The patient is an 80year-old-female with a history of COPD, DVT in her right leg who presents to the Emergency Department via EMS from her nursing facility complaining of shortness of breath and tachycardia. She was found to be 79% on room air; however, she is typically on 3 L NC. Nursing staff also felt that her heart rate was fast and irregular. The patient states she has been experiencing increased shortness of breath over the past week that has become progressively worse. Nothing makes it better or worse. Her symptoms have been gradual and persistent. She is currently not on any anticoagulation. She states that the DVT in her leg was many years ago. She denies any fever, chills, nausea, vomiting, diarrhea, lightheadedness, dizziness, syncope, recent hospitalization, recent illness, or other acute symptoms or concerns.      Review of Systems:   Pertinent positives and negatives are stated within HPI, all other systems reviewed and are negative.        --------------------------------------------- PAST HISTORY ---------------------------------------------  Past Medical History:  has a past medical history of Arthritis, Asthma, Calf DVT (deep venous thrombosis) ([facility] [address] COPD [facility]chronic obstructive pulmonary disease) [facility] Hemorrhoids, internal, Hx of blood clots, Hypertension, Polymyalgia ([address] PONV (postoperative nausea and vomiting), Renal insufficiency, Rosacea keratitis, Shingles, Sleep apnea, UTI (lower urinary tract infection), and Volume overload. Past Surgical History:  has a past surgical history that includes Cholecystectomy; Colonoscopy; Kyphosis surgery; vertebroplasty; Dilation and curettage of uterus (2011); lobectomy (rt upper); Endometrial biopsy; fracture surgery; fracture surgery (1995); Cataract removal with implant (Right, 7 8 13); Cataract removal with implant (Left, 09/09/13); vascular surgery (2014); and other surgical history (Left, 9-15-15). Social History:  reports that she has never smoked. She has never used smokeless tobacco. She reports that she does not drink alcohol or use drugs. Family History: family history includes Cancer in her brother; Heart Disease in her mother and sister; Kidney Disease in her father. . Unless otherwise noted, family history is non contributory    The patients home medications have been reviewed. Allergies: Cardizem [diltiazem hcl], Nsaids, and Tape [adhesive tape]    I have reviewed the past medical history, past surgical history, social history, and family history    ---------------------------------------------------PHYSICAL EXAM--------------------------------------    Constitutional/General: Alert and oriented x3. Chronically ill appearing, nasal cannula in place. No acute distress. Head: Normocephalic and atraumatic  Eyes: EOMI, sclera non icteric  Mouth: Oropharynx clear, handling secretions, no trismus, no asymmetry of the posterior oropharynx or uvular edema  Neck: Supple, full ROM, no stridor, no meningeal signs  Respiratory: Diminished at the bilateral bases, coarse breath sounds throughout. Cardiovascular:  Tachycardic rate. Irregular rhythm. No murmurs, no aortic murmurs, no gallops, or rubs. 2+ distal pulses. Equal extremity pulses.    Chest: No chest wall tenderness  Gastrointestinal:  Abdomen Soft, Non tender, Non distended. No rebound, guarding, or rigidity. No pulsatile masses. Musculoskeletal: Moves all extremities x 4. Warm and well perfused, no clubbing, no cyanosis, +1 edema bilateral lower extremities. . Capillary refill <3 seconds  Skin: skin warm and dry. No rashes. Neurologic: GCS 15, no focal deficits, symmetric strength 5/5 in the upper and lower extremities bilaterally  Psychiatric: Normal Affect          -------------------------------------------------- RESULTS -------------------------------------------------  I have personally reviewed all laboratory and imaging results for this patient. Results are listed below.      LABS: (Lab results interpreted by me)  Results for orders placed or performed during the hospital encounter of 03/23/21   Culture, Blood 1    Specimen: Blood   Result Value Ref Range    Blood Culture, Routine 24 Hours no growth    Culture, Blood 2    Specimen: Blood   Result Value Ref Range    Culture, Blood 2 24 Hours no growth    COVID-19, Rapid    Specimen: Nasopharyngeal Swab   Result Value Ref Range    SARS-CoV-2, NAAT Not Detected Not Detected   Respiratory Panel, Molecular, with COVID-19 (Restricted: peds pts or suitable admitted adults)    Specimen: Nasopharyngeal   Result Value Ref Range    Adenovirus by PCR Not Detected Not Detected    Bordetella parapertussis by PCR Not Detected Not Detected    Bordetella pertussis by PCR Not Detected Not Detected    Chlamydophilia pneumoniae by PCR Not Detected Not Detected    Coronavirus 229E by PCR Not Detected Not Detected    Coronavirus HKU1 by PCR Not Detected Not Detected    Coronavirus NL63 by PCR Not Detected Not Detected    Coronavirus OC43 by PCR Not Detected Not Detected    SARS-CoV-2, PCR Not Detected Not Detected    Human Metapneumovirus by PCR Not Detected Not Detected    Human Rhinovirus/Enterovirus by PCR Not Detected Not Detected    Influenza A by PCR Not Detected Not Detected    Influenza B by PCR Not Detected Not Detected    Mycoplasma pneumoniae by PCR Not Detected Not Detected    Parainfluenza Virus 1 by PCR Not Detected Not Detected    Parainfluenza Virus 2 by PCR Not Detected Not Detected    Parainfluenza Virus 3 by PCR Not Detected Not Detected    Parainfluenza Virus 4 by PCR Not Detected Not Detected    Respiratory Syncytial Virus by PCR Not Detected Not Detected   LEGIONELLA ANTIGEN, URINE    Specimen: Urine, clean catch   Result Value Ref Range    L. pneumophila Serogp 1 Ur Ag       Presumptive Negative -suggesting no recent or current infections  with Legionella pneumophila serogroup 1. Infection to Legionella cannot be ruled out since other serogroups  and species may cause infection, antigen may not be present in  early infection, or level of antigen may be below the  detection limit. Normal Range: Presumptive Negative     Strep Pneumoniae Antigen    Specimen: Urine, clean catch   Result Value Ref Range    STREP PNEUMONIAE ANTIGEN, URINE       Presumptive negative- suggests no current or recent  pneumococcal infection.   Infection due to Strep pneumoniae cannot be  ruled out since the antigen present in the sample  may be below the detection limit of the test.  Normal Range:Presumptive Negative     CBC auto differential   Result Value Ref Range    WBC 10.0 4.5 - 11.5 E9/L    RBC 3.94 3.50 - 5.50 E12/L    Hemoglobin 11.4 (L) 11.5 - 15.5 g/dL    Hematocrit 37.6 34.0 - 48.0 %    MCV 95.4 80.0 - 99.9 fL    MCH 28.9 26.0 - 35.0 pg    MCHC 30.3 (L) 32.0 - 34.5 %    RDW 14.1 11.5 - 15.0 fL    Platelets 103 300 - 629 E9/L    MPV 10.8 7.0 - 12.0 fL    Neutrophils % 86.2 (H) 43.0 - 80.0 %    Immature Granulocytes % 0.6 0.0 - 5.0 %    Lymphocytes % 4.3 (L) 20.0 - 42.0 %    Monocytes % 7.8 2.0 - 12.0 %    Eosinophils % 0.7 0.0 - 6.0 %    Basophils % 0.4 0.0 - 2.0 %    Neutrophils Absolute 8.63 (H) 1.80 - 7.30 E9/L    Immature Granulocytes # 0.06 E9/L    Lymphocytes Absolute 0.43 (L) 1.50 - 4.00 E9/L    Monocytes Absolute 0.78 0.10 - 0.95 E9/L    Eosinophils Absolute 0.07 0.05 - 0.50 E9/L    Basophils Absolute 0.04 0.00 - 0.20 E9/L    Polychromasia 1+     Hypochromia 1+     Poikilocytes 2+     Ovalocytes 2+    Comprehensive Metabolic Panel w/ Reflex to MG   Result Value Ref Range    Sodium 142 132 - 146 mmol/L    Potassium reflex Magnesium 3.3 (L) 3.5 - 5.0 mmol/L    Chloride 97 (L) 98 - 107 mmol/L    CO2 33 (H) 22 - 29 mmol/L    Anion Gap 12 7 - 16 mmol/L    Glucose 115 (H) 74 - 99 mg/dL    BUN 22 8 - 23 mg/dL    CREATININE 0.7 0.5 - 1.0 mg/dL    GFR Non-African American >60 >=60 mL/min/1.73    GFR African American >60     Calcium 8.7 8.6 - 10.2 mg/dL    Total Protein 6.5 6.4 - 8.3 g/dL    Albumin 3.6 3.5 - 5.2 g/dL    Total Bilirubin 0.6 0.0 - 1.2 mg/dL    Alkaline Phosphatase 63 35 - 104 U/L    ALT 26 0 - 32 U/L    AST 24 0 - 31 U/L   Troponin   Result Value Ref Range    Troponin <0.01 0.00 - 0.03 ng/mL   LACTIC ACID, PLASMA   Result Value Ref Range    Lactic Acid 0.8 0.5 - 2.2 mmol/L   Brain Natriuretic Peptide   Result Value Ref Range    Pro-BNP 2,755 (H) 0 - 450 pg/mL   TSH without Reflex   Result Value Ref Range    TSH 1.420 0.270 - 4.200 uIU/mL   Blood Gas, Arterial   Result Value Ref Range    Date Analyzed [id number]     Time Analyzed 9488     Source: Blood Arterial     pH, Blood Gas 7.404 7.350 - 7.450    PCO2 58.2 (H) 35.0 - 45.0 mmHg    PO2 113.9 (H) 75.0 - 100.0 mmHg    HCO3 35.6 (H) 22.0 - 26.0 mmol/L    B.E. 9.1 (H) -3.0 - 3.0 mmol/L    O2 Sat 97.2 92.0 - 98.5 %    O2Hb 95.3 94.0 - 97.0 %    COHb 1.8 (H) 0.0 - 1.5 %    MetHb 0.2 0.0 - 1.5 %    O2 Content 16.0 mL/dL    HHb 2.7 0.0 - 5.0 %    tHb (est) 11.8 11.5 - 16.5 g/dL    Mode NC- 3 L     Date Of Collection      Time Collected      Pt Temp 37.0 C     ID [id number]     Lab [id number]     Critical(s) Notified .  No Critical Values    Magnesium   Result Value Ref Range    Magnesium 2.0 1.6 - 2.6 mg/dL   Blood Gas, Arterial Result Value Ref Range    Date Analyzed [id number]     Time Analyzed 1619     Source: Blood Arterial     pH, Blood Gas 7.401 7.350 - 7.450    PCO2 54.5 (H) 35.0 - 45.0 mmHg    PO2 91.5 75.0 - 100.0 mmHg    HCO3 33.1 (H) 22.0 - 26.0 mmol/L    B.E. 6.9 (H) -3.0 - 3.0 mmol/L    O2 Sat 96.0 92.0 - 98.5 %    O2Hb 94.0 94.0 - 97.0 %    COHb 1.9 (H) 0.0 - 1.5 %    MetHb 0.2 0.0 - 1.5 %    O2 Content 15.8 mL/dL    HHb 3.9 0.0 - 5.0 %    tHb (est) 11.9 11.5 - 16.5 g/dL    Mode NC- 5 L     Date Of Collection      Time Collected      Pt Temp 37.0 C     ID 1394     Lab [id number]     Critical(s) Notified .  No Critical Values    Procalcitonin   Result Value Ref Range    Procalcitonin 0.09 (H) 0.00 - 0.08 ng/mL   Comprehensive Metabolic Panel w/ Reflex to MG   Result Value Ref Range    Sodium 140 132 - 146 mmol/L    Potassium reflex Magnesium 3.1 (L) 3.5 - 5.0 mmol/L    Chloride 96 (L) 98 - 107 mmol/L    CO2 36 (H) 22 - 29 mmol/L    Anion Gap 8 7 - 16 mmol/L    Glucose 82 74 - 99 mg/dL    BUN 17 8 - 23 mg/dL    CREATININE 0.7 0.5 - 1.0 mg/dL    GFR Non-African American >60 >=60 mL/min/1.73    GFR African American >60     Calcium 8.3 (L) 8.6 - 10.2 mg/dL    Total Protein 6.2 (L) 6.4 - 8.3 g/dL    Albumin 3.5 3.5 - 5.2 g/dL    Total Bilirubin 0.6 0.0 - 1.2 mg/dL    Alkaline Phosphatase 55 35 - 104 U/L    ALT 22 0 - 32 U/L    AST 22 0 - 31 U/L   CBC auto differential   Result Value Ref Range    WBC 7.7 4.5 - 11.5 E9/L    RBC 3.95 3.50 - 5.50 E12/L    Hemoglobin 11.6 11.5 - 15.5 g/dL    Hematocrit 38.3 34.0 - 48.0 %    MCV 97.0 80.0 - 99.9 fL    MCH 29.4 26.0 - 35.0 pg    MCHC 30.3 (L) 32.0 - 34.5 %    RDW 14.1 11.5 - 15.0 fL    Platelets 189 577 - 296 E9/L    MPV 11.4 7.0 - 12.0 fL    Neutrophils % 78.1 43.0 - 80.0 %    Lymphocytes % 7.9 (L) 20.0 - 42.0 %    Monocytes % 12.3 (H) 2.0 - 12.0 %    Eosinophils % 0.9 0.0 - 6.0 %    Basophils % 0.5 0.0 - 2.0 %    Neutrophils Absolute 6.08 1.80 - 7.30 E9/L    Lymphocytes Absolute 0.62 (L) 1.50 - 4.00 E9/L    Monocytes Absolute 0.92 0.10 - 0.95 E9/L    Eosinophils Absolute 0.07 0.05 - 0.50 E9/L    Basophils Absolute 0.00 0.00 - 0.20 E9/L    Myelocyte Percent 0.9 0 - 0 %    nRBC 0.9 /100 WBC    Anisocytosis 1+     Polychromasia 2+     Hypochromia 1+     Poikilocytes 2+     [place] Cells 1+     Ovalocytes 2+    Magnesium   Result Value Ref Range    Magnesium 2.2 1.6 - 2.6 mg/dL   Urinalysis   Result Value Ref Range    Color, UA Yellow Straw/Yellow    Clarity, UA Clear Clear    Glucose, Ur Negative Negative mg/dL    Bilirubin Urine Negative Negative    Ketones, Urine Negative Negative mg/dL    Specific Gravity, UA <=1.005 1.005 - 1.030    Blood, Urine Negative Negative    pH, UA 7.0 5.0 - 9.0    Protein, UA Negative Negative mg/dL    Urobilinogen, Urine 0.2 <2.0 E.U./dL    Nitrite, Urine Negative Negative    Leukocyte Esterase, Urine Negative Negative   T4, Free   Result Value Ref Range    T4 Free 1.23 0.93 - 1.70 ng/dL   Hemoglobin A1c   Result Value Ref Range    Hemoglobin A1C 5.1 4.0 - 5.6 %   Comprehensive Metabolic Panel w/ Reflex to MG   Result Value Ref Range    Sodium 145 132 - 146 mmol/L    Potassium reflex Magnesium 4.0 3.5 - 5.0 mmol/L    Chloride 100 98 - 107 mmol/L    CO2 37 (H) 22 - 29 mmol/L    Anion Gap 8 7 - 16 mmol/L    Glucose 101 (H) 74 - 99 mg/dL    BUN 19 8 - 23 mg/dL    CREATININE 0.7 0.5 - 1.0 mg/dL    GFR Non-African American >60 >=60 mL/min/1.73    GFR African American >60     Calcium 8.7 8.6 - 10.2 mg/dL    Total Protein 6.2 (L) 6.4 - 8.3 g/dL    Albumin 3.4 (L) 3.5 - 5.2 g/dL    Total Bilirubin 0.6 0.0 - 1.2 mg/dL    Alkaline Phosphatase 57 35 - 104 U/L    ALT 19 0 - 32 U/L    AST 21 0 - 31 U/L   CBC auto differential   Result Value Ref Range    WBC 8.7 4.5 - 11.5 E9/L    RBC 4.04 3.50 - 5.50 E12/L    Hemoglobin 11.6 11.5 - 15.5 g/dL    Hematocrit 38.9 34.0 - 48.0 %    MCV 96.3 80.0 - 99.9 fL    MCH 28.7 26.0 - 35.0 pg    MCHC 29.8 (L) 32.0 - 34.5 %    RDW 13.9 11.5 - 15.0 fL Platelets 398 417 - 017 E9/L    MPV 10.9 7.0 - 12.0 fL    Neutrophils % 74.7 43.0 - 80.0 %    Immature Granulocytes % 0.5 0.0 - 5.0 %    Lymphocytes % 9.0 (L) 20.0 - 42.0 %    Monocytes % 14.0 (H) 2.0 - 12.0 %    Eosinophils % 1.3 0.0 - 6.0 %    Basophils % 0.5 0.0 - 2.0 %    Neutrophils Absolute 6.47 1.80 - 7.30 E9/L    Immature Granulocytes # 0.04 E9/L    Lymphocytes Absolute 0.78 (L) 1.50 - 4.00 E9/L    Monocytes Absolute 1.21 (H) 0.10 - 0.95 E9/L    Eosinophils Absolute 0.11 0.05 - 0.50 E9/L    Basophils Absolute 0.04 0.00 - 0.20 E9/L   Brain Natriuretic Peptide   Result Value Ref Range    Pro-BNP 2,566 (H) 0 - 450 pg/mL   EKG 12 Lead   Result Value Ref Range    Ventricular Rate 102 BPM    Atrial Rate 441 BPM    QRS Duration 92 ms    Q-T Interval 336 ms    QTc Calculation (Bazett) 437 ms    R Axis 53 degrees    T Axis -102 degrees   ,       RADIOLOGY:  Interpreted by Radiologist unless otherwise specified  [address] [facility] W DVT   Final Result   Within the visualized vessels there is no evidence for deep venous   thrombosis               CTA CHEST W CONTRAST   Final Result   Focal nonocclusive thrombus involving a 1st order branch vessel to the left   lower lobe. Moderate bilateral pleural effusions left greater than right with adjacent   atelectasis. Large hiatal hernia. Cardiomegaly. Findings discussed on 03/23/2021 at 5:28 p.m. with Dr. Fortunato Dunaway. XR CHEST PORTABLE   Final Result   Bilateral opacities notable in the lung bases suggestive of pneumonia,   atelectasis, and likely bilateral pleural effusions.                EKG Interpretation  Interpreted by Aylin Covington    Date of EKG: 3/23/21  Time: 1347    Rhythm: atrial fibrillation - rapid  Rate: normal  Axis: normal  Conduction: normal  ST Segments: nonspecific changes  T Waves: non specific changes    Clinical Impression: Atrial fibrillation with rapid ventricular response, normal axis, nonspecific ST changes throughout, 437  Comparison to prior EKG: stable as compared to patient's most recent EKG      ------------------------- NURSING NOTES AND VITALS REVIEWED ---------------------------   The nursing notes within the ED encounter and vital signs as below have been reviewed by myself  /68   Pulse 92   Temp 97.4 °F (36.3 °C) (Temporal)   Resp 14   Ht 5' 1\" (1.549 m)   Wt 136 lb (61.7 kg)   SpO2 96%   BMI 25.70 kg/m²     Oxygen Saturation Interpretation: 79 % on room air. Abnormal    The patients available past medical records and past encounters were reviewed.         ------------------------------ ED COURSE/MEDICAL DECISION MAKING----------------------  Medications   sodium chloride flush 0.9 % injection 10 mL (10 mLs Intravenous Given 3/23/21 1642)   albuterol (PROVENTIL) nebulizer solution 2.5 mg (has no administration in time range)   calcium elemental (OSCAL) tablet 500 mg (500 mg Oral Given 3/24/21 2019)   isosorbide mononitrate (IMDUR) extended release tablet 30 mg (30 mg Oral Given 3/24/21 0800)   leflunomide (ARAVA) tablet 20 mg ( Oral Automatically Held 3/30/21 0900)   melatonin tablet 1.5 mg (has no administration in time range)   metoprolol tartrate (LOPRESSOR) tablet 25 mg (25 mg Oral Given 3/24/21 2019)   sodium chloride flush 0.9 % injection 10 mL (10 mLs Intravenous Given 3/24/21 2019)   sodium chloride flush 0.9 % injection 10 mL (has no administration in time range)   polyethylene glycol (GLYCOLAX) packet 17 g (has no administration in time range)   acetaminophen (TYLENOL) tablet 650 mg (has no administration in time range)     Or   acetaminophen (TYLENOL) suppository 650 mg (has no administration in time range)   potassium chloride 10 mEq/100 mL IVPB (Peripheral Line) (has no administration in time range)   magnesium sulfate 2000 mg in 50 mL IVPB premix (has no administration in time range)   budesonide-formoterol (SYMBICORT) 80-4.5 MCG/ACT inhaler 2 puff (2 puffs Inhalation Given 3/24/21 2132)   perflutren lipid microspheres (DEFINITY) injection 1.65 mg (has no administration in time range)   potassium chloride (KLOR-CON M) extended release tablet 40 mEq (40 mEq Oral Given 3/24/21 1724)   enoxaparin (LOVENOX) injection 60 mg (60 mg Subcutaneous Given 3/24/21 2019)   furosemide (LASIX) injection 40 mg (40 mg Intravenous Given 3/24/21 1724)   spironolactone (ALDACTONE) tablet 25 mg (has no administration in time range)   furosemide (LASIX) injection 40 mg (40 mg Intravenous Given 3/23/21 1615)   potassium chloride 10 mEq/100 mL IVPB (Peripheral Line) (10 mEq Intravenous New Bag 3/24/21 0119)   iopamidol (ISOVUE-370) 76 % injection 75 mL (75 mLs Intravenous Given 3/23/21 1641)   enoxaparin (LOVENOX) injection 60 mg (60 mg Subcutaneous Given 3/23/21 1753)   metoprolol tartrate (LOPRESSOR) tablet 25 mg (25 mg Oral Given 3/23/21 2049)   digoxin (LANOXIN) injection 250 mcg (250 mcg Intravenous Given 3/24/21 1511)           The cardiac monitor revealed Afib with a heart rate in the 70-100s as interpreted by me. The cardiac monitor was ordered secondary to the patient's shortness of breath and to monitor the patient for dysrhythmia. CPT 28740           Medical Decision Making:     The patient was seen and evaluated by the Attending Emergency Medicine Physician Dr. Brigida Snellen. The patient is an 80year-old-female who presents to the Emergency Department complaining of shortness of breath and tachycardia. She is found to be in afib, rate in the 's and BP is stable. She does have slight increased work of breathing and is on NC O2. ABG does show slight hypercapnia; however, she states she is feeling better with the nasal cannula, repeat ABG has improved so held off on BiPAP. CTA pending, but did show small PE, Lovenox was provided. CXR evident of vascular congestion and BNP elevated consistent with CHF exacerbation as well. Consulted with hospitalist who accepted for admission.  Discussed results and plan with patient and she agreed. Re-Evaluations:  ED Course as of Mar 25 0754   Tue Mar 23, 2021   1541 Consulted with Dr. Adilene Hardwick, hospitalist, who accepted the patient for admission. [KG]      ED Course User Index  [KG] Gilbertogera Forte DO       This patient's ED course included: a personal history and physicial examination, re-evaluation prior to disposition, multiple bedside re-evaluations, IV medications, cardiac monitoring, continuous pulse oximetry and complex medical decision making and emergency management    This patient has remained hemodynamically stable during their ED course. Consultations:  Consulted with hospitalist who accepted for admission. Counseling: The emergency provider has spoken with the patient and discussed todays results, in addition to providing specific details for the plan of care and counseling regarding the diagnosis and prognosis. Questions are answered at this time and they are agreeable with the plan.       --------------------------------- IMPRESSION AND DISPOSITION ---------------------------------    IMPRESSION  1. Acute respiratory failure with hypoxia and hypercapnia (HCC)    2. Acute congestive heart failure, unspecified heart failure type ([address]    3. Other acute pulmonary embolism without acute cor pulmonale ([address]    4. Single subsegmental pulmonary embolism without acute cor pulmonale (HCC)        DISPOSITION  Disposition: Admit to telemetry  Patient condition is stable        NOTE: This report was transcribed using voice recognition software.  Every effort was made to ensure accuracy; however, inadvertent computerized transcription errors may be present        Gilbertogera Forte DO  Resident  03/25/21 9051

## 2021-03-24 PROBLEM — J44.9 COPD (CHRONIC OBSTRUCTIVE PULMONARY DISEASE) (HCC): Status: ACTIVE | Noted: 2021-01-01

## 2021-03-24 NOTE — ED NOTES
Resident to cancel respiratory panel because already had neg covid Joesphine Aase, MARYANN  03/23/21 1684

## 2021-03-24 NOTE — PROGRESS NOTES
[address] [facility]  Family Medicine Attending    S: 80 y.o. female admitted yesterday with shortness of breath. Was found to have a fib with RVR in low 100's, pleural effusions and/or infiltrate, and non-occlusive thrombus of left lower lobe, in addition to her other long-standing co-morbidities. She feels less short of breath this morning, and ate breakfast.    O:   VS- Blood pressure 119/87, pulse 100, temperature 97.8 °F (36.6 °C), temperature source Temporal, resp. rate 16, height 5' 1\" (1.549 m), weight 134 lb (60.8 kg), SpO2 93 %, not currently breastfeeding. Exam is as noted by resident. Speaking in full sentences. Well oriented  Neck supple, no bruits or thyromegaly  Heart irregularly irregular, rate controlled at 831;   systolic murmur  Lungs diminished breath sounds  Abd supple,  No tenderness or organomegaly  No leg edema or tenderness        Impressions: Active Problems:    Osteoporosis     History of compression fracture of spine    Acute respiratory failure with hypoxia and hypercapnia (HCC) improving    Thrombus- left lower lobe    Pleural effusion    Atrial fibrillation with rapid ventricular response (HCC)    Elevated brain natriuretic peptide (BNP) level    COPD (chronic obstructive pulmonary disease) (HCC)    Asthma             Plan:       Current Facility-Administered Medications   Medication Dose Route Frequency Provider Last Rate Last Admin    perflutren lipid microspheres (DEFINITY) injection 1.65 mg  1.5 mL Intravenous ONCE PRN Pearley Comfort. Ginder, APN        potassium chloride (KLOR-CON M) extended release tablet 40 mEq  40 mEq Oral TID  Pearley Comfort. Ginder, APN   40 mEq at 03/24/21 0801    enoxaparin (LOVENOX) injection 60 mg  1 mg/kg Subcutaneous BID Pearley Comfort. Ginder, APN   60 mg at 03/24/21 0801    digoxin (LANOXIN) injection 250 mcg  250 mcg Intravenous Q6H Pearley Comfort.  Ginder, APN        furosemide (LASIX) injection 40 mg  40 mg Intravenous BID Kellen Eddy, DO        [START ON 3/25/2021] spironolactone (ALDACTONE) tablet 25 mg  25 mg Oral Daily Richardabena Abena,         sodium chloride flush 0.9 % injection 10 mL  10 mL Intravenous PRN Sherif Zelaya DO   10 mL at 83/62/13 1642    albuterol (PROVENTIL) nebulizer solution 2.5 mg  2.5 mg Nebulization Q6H PRN Vanna Sawant MD        calcium elemental (OSCAL) tablet 500 mg  500 mg Oral BID Vanna Sawant MD   500 mg at 03/24/21 0800    isosorbide mononitrate (IMDUR) extended release tablet 30 mg  30 mg Oral Daily Vanna Sawant MD   30 mg at 03/24/21 0800    [Held by provider] leflunomide (ARAVA) tablet 20 mg  20 mg Oral Daily Vanna Sawant MD   20 mg at 03/24/21 0758    melatonin tablet 1.5 mg  1.5 mg Oral Nightly PRN Vanna Sawant MD        metoprolol tartrate (LOPRESSOR) tablet 25 mg  25 mg Oral BID Vanna Sawant MD   25 mg at 03/24/21 0759    sodium chloride flush 0.9 % injection 10 mL  10 mL Intravenous 2 times per day Vanna Sawant MD   10 mL at 03/24/21 0802    sodium chloride flush 0.9 % injection 10 mL  10 mL Intravenous PRN Vanna Sawant MD        polyethylene glycol (GLYCOLAX) packet 17 g  17 g Oral Daily PRN Vanna Sawant MD        acetaminophen (TYLENOL) tablet 650 mg  650 mg Oral Q6H PRN Vanna Sawant MD        Or    acetaminophen (TYLENOL) suppository 650 mg  650 mg Rectal Q6H PRN Vanna Sawant MD        potassium chloride 10 mEq/100 mL IVPB (Peripheral Line)  10 mEq Intravenous PRN Vanna Sawant MD        magnesium sulfate 2000 mg in 50 mL IVPB premix  2,000 mg Intravenous PRN Vanna Sawant MD        budesonide-formoterol (SYMBICORT) 80-4.5 MCG/ACT inhaler 2 puff  2 puff Inhalation BID Vanna Sawant MD   2 puff at 03/24/21 0012        Attending Physician Statement  I have reviewed the chart, including any radiology or labs, and seen the patient with the resident(s).   I personally reviewed and performed key elements of the history and exam.  I agree with the assessment, plan and orders as documented by the resident. Please refer to the resident note for additional information.       Haroon Mcdonnell

## 2021-03-24 NOTE — CONSULTS
Inpatient Cardiology Consultation      Reason for Consult:  Abnormal Heart Rhythm    Consulting Physician: Dr Kristina Acosta    Requesting Physician:  Dr Amy Gaona    Date of Consultation: 3/24/2021    HISTORY OF PRESENT ILLNESS: 81 yo female known to Dr Kristina Acosta last seen in the office 2/25/2021. PMh: HTN, PMR, rheumatoid arthritis, chronically immunosuppressed, CKD, SVT, CHRISTOPHER unable to tolerate C-pap, and O2 3.5 liters NC.   NH nurse called office reporting patient having irregular heart beat  and SOB was 93% on 3.5 liters of NC. Was instructed to come to ED  [facility] 3/23/2021 BP upon arrival 124/67 HR 70-90's was 79% (normally on 3 liters but arrived on RA) placed at 4 liters NC, afebrile. CTA Chest W -->Focal nonocclusive thrombus involving a 1st order branch vessel to the left lower lobe with moderate bilateral pleural effusions R>L, large hiatal hernia. Na 140, K+ 3.3-->3.1, Bun/Cr 22/0.7-->17/0.7, magnesium 2.2, p-BNP 2755 9 no p-BNP to compare), troponin <0.01, LFT's WNL. Hgb 11.4, WBC 7.7, TSH 1.420, BC x 2 drawn, respiratory panel and COVID-NEGATIVE. EKG AF RVR rate 102    Received 40 mg IV Lasix, Lopressor 25 mg PO, 60 mg Sub-Q Lovenox, and 30 mEq of potassium IV. Has been experiencing SOB/HANSON for approximately 1 month but has became significantly worse over the weekend. Also complains of BLE leg pain below the knees. Denies any change in appetite. + orthopnea/PND. Please note: past medical records were reviewed per electronic medical record (EMR) - see detailed reports under Past Medical/ Surgical History. Past Medical History:    1. [place]  2. Cardizem: rash  3. HTN  4. Rheumatoid arthritis on Arava  5. Polymyalgia rheumatica on Prednisone and Plaquenil  6. Chronically immunosuppressed  7. CKD  8. 1998 RLE DVT  9. 9/2013 TTE Normal LV systolic function. Stage I DD. Trace MR/AI/TR  10. Paroxysmal SVT (per PCP note)  11. Low QT syndrome (per PCP office note 11/2020)  12. Osteoporosis  13.  R sciatica/low back pain. r hip injection 2017  14. CHRISTOPHER unable to tolerate C-pap  15. Hx shingles  16. Hemorrhoids  17. Mild LAD on RLE-->CONSUELO's 2016 R 0.97 nad L 0.91 as per Dr Sonia Izquierdo  18. 3/20/2017 Pascual French Nim: Non ischemic. EF 85%. NWM.  19. Chronic HFpEF  20. Wears compression hose  21. 2020 Wears 3 liter NC around the clock  22. 1/2021 New patient for Dr Pam Rosas--> Aldactone 12.5 added  23. COVID vaccine completed 2/18/2021        Past Surgical History:    R upper lobe pneumonectomy, kyphoplasty, cholecystectomy, Right and Left (2015) fractured wrist ORIF's, bilateral cataract surgery, varicose vein laser surgery      Medications Prior to admit:  Prior to Admission medications    Medication Sig Start Date End Date Taking? Authorizing Provider   furosemide (LASIX) 40 MG tablet Take 1 tablet by mouth 2 times daily Take 2 tablets daily unless weight is below 138lbs then take 1 tablet daily 3/23/21  Yes Mariann Marrero, DO   fluticasone-vilanterol (BREO ELLIPTA) 100-25 MCG/INH AEPB inhaler Inhale into the lungs daily   Yes Historical Provider, MD   spironolactone (ALDACTONE) 25 MG tablet Take 12.5 mg by mouth daily   Yes Historical Provider, MD   ipratropium (ATROVENT) 0.03 % nasal spray by Each Nostril route as needed for Rhinitis Use as directed as needed   Yes Historical Provider, MD   Menthol, Topical Analgesic, (BIOFREEZE) 4 % GEL Apply topically Apply topically to affected area as directed.    Yes Historical Provider, MD   leflunomide (ARAVA) 20 MG tablet Take 1 tablet by mouth daily 3/22/21  Yes Karin Ac MD   metoprolol tartrate (LOPRESSOR) 25 MG tablet Take 1 tablet by mouth 2 times daily 1/22/21  Yes Karin Ac MD   isosorbide mononitrate (IMDUR) 30 MG extended release tablet Take 1 tablet by mouth daily 1/6/21  Yes Karin Ac MD   albuterol (PROVENTIL) (2.5 MG/3ML) 0.083% nebulizer solution Take 3 mLs by nebulization every 6 hours as needed for Wheezing 1/6/21 4/6/21 Yes Evaline Aiden Moraima Walter MD   Multiple Vitamins-Minerals (OCUVITE ADULT 50+) CAPS Take 1 capsule by mouth daily 12/28/20  Yes Meenu Lincoln MD   albuterol sulfate HFA (VENTOLIN HFA) 108 (90 Base) MCG/ACT inhaler Inhale 2 puffs into the lungs 4 times daily as needed for Wheezing 12/26/20  Yes Meenu Lincoln MD   predniSONE (DANNA) 5 MG TBEC One pill daily at bedtime with food 12/14/20  Yes Meenu Lincoln MD   melatonin 1 MG tablet Take 1 tablet by mouth nightly as needed for Sleep 11/18/20  Yes Meenu Lincoln MD   loratadine (CLARITIN) 10 MG tablet One daily at bedtime 11/12/20  Yes Meenu Lincoln MD   calcium carbonate (CALCIUM 600) 600 MG TABS tablet Take 1 tablet by mouth 2 times daily 11/10/20  Yes Meenu Lincoln MD   vitamin D (CHOLECALCIFEROL) 50 MCG (2000 UT) TABS tablet Take 1 tablet by mouth daily 11/10/20  Yes Meenu Lincoln MD   Wheat Dextrin [facility]) POWD One-half teaspoon with breakfast daily 11/10/20  Yes Meenu Lincoln MD   acetaminophen (TYLENOL) 650 MG extended release tablet Two tablets every 8 hours 11/10/20  Yes Meenu Lincoln MD   zinc oxide (BOUDREAUXS BUTT PASTE) 40 % ointment Apply topically as needed. 10/20/20  Yes Dale Ahmadi MD   Flaxseed, Linseed, (FLAXSEED OIL PO) Take 1,000 mg by mouth   Yes Historical Provider, MD   zoledronic acid (RECLAST) 5 MG/100ML SOLN Infuse 5 mg intravenously once Yearly   Yes Historical Provider, MD   predniSONE (DELTASONE) 5 MG tablet Take 5 mg by mouth daily    Yes Historical Provider, MD   hydroxychloroquine (PLAQUENIL) 200 MG tablet Take 200 mg by mouth daily    Yes Historical Provider, MD   therapeutic multivitamin-minerals (THERAGRAN-M) tablet Take 1 tablet by mouth daily.      Yes Historical Provider, MD       Current Medications:    Current Facility-Administered Medications: sodium chloride flush 0.9 % injection 10 mL, 10 mL, Intravenous, PRN  albuterol (PROVENTIL) nebulizer solution 2.5 mg, 2.5 mg, Nebulization, Q6H PRN  calcium elemental (OSCAL) tablet 500 mg, 500 mg, Oral, BID  furosemide (LASIX) tablet 40 mg, 40 mg, Oral, BID  isosorbide mononitrate (IMDUR) extended release tablet 30 mg, 30 mg, Oral, Daily  leflunomide (ARAVA) tablet 20 mg, 20 mg, Oral, Daily  melatonin tablet 1.5 mg, 1.5 mg, Oral, Nightly PRN  metoprolol tartrate (LOPRESSOR) tablet 25 mg, 25 mg, Oral, BID  predniSONE (DELTASONE) tablet 5 mg, 5 mg, Oral, Daily  spironolactone (ALDACTONE) tablet 12.5 mg, 12.5 mg, Oral, Daily  perflutren lipid microspheres (DEFINITY) injection 1.65 mg, 1.5 mL, Intravenous, ONCE PRN  sodium chloride flush 0.9 % injection 10 mL, 10 mL, Intravenous, 2 times per day  sodium chloride flush 0.9 % injection 10 mL, 10 mL, Intravenous, PRN  polyethylene glycol (GLYCOLAX) packet 17 g, 17 g, Oral, Daily PRN  acetaminophen (TYLENOL) tablet 650 mg, 650 mg, Oral, Q6H PRN **OR** acetaminophen (TYLENOL) suppository 650 mg, 650 mg, Rectal, Q6H PRN  potassium chloride 10 mEq/100 mL IVPB (Peripheral Line), 10 mEq, Intravenous, PRN  magnesium sulfate 2000 mg in 50 mL IVPB premix, 2,000 mg, Intravenous, PRN  famotidine (PEPCID) injection 20 mg, 20 mg, Intravenous, Daily  enoxaparin (LOVENOX) injection 40 mg, 40 mg, Subcutaneous, Daily  budesonide-formoterol (SYMBICORT) 80-4.5 MCG/ACT inhaler 2 puff, 2 puff, Inhalation, BID    Allergies:  Cardizem [diltiazem hcl], Nsaids, and Tape [adhesive tape]   Cardizem: rash    Social History:    Lifelong non smoker  Denies ETOH  Activity: Limited activity over last 3-4 days due ot worsening HANSON/SOB  Code Status: Full code      Family History: non contributory secondary to age      REVIEW OF SYSTEMS:     · Constitutional: + fatigue. Denies fevers, chills or night sweats  · Eyes: Denies visual changes or drainage  · ENT: Denies headaches or hearing loss. No mouth sores or sore throat. No epistaxis   · Cardiovascular: Denies chest pain, pressure or palpitations. No lower extremity swelling. · Respiratory: + SOB/HANSON. +orthopnea/PND.  No hemoptysis   · Gastrointestinal: Denies hematemesis or anorexia. No hematochezia or melena    · Genitourinary: Denies urgency, dysuria or hematuria. · Musculoskeletal: + unsteady gait. Chronic knee/back pain. · Integumentary: Denies rash, hives or pruritis   · Neurological: Denies dizziness, headaches or seizures. No numbness or tingling  · Psychiatric: Denies anxiety or depression. · Endocrine: Denies temperature intolerance. No recent weight change. .  · Hematologic/Lymphatic: Denies abnormal bruising or bleeding. No swollen lymph nodes    PHYSICAL EXAM:   /75   Pulse 99   Temp 97.4 °F (36.3 °C) (Temporal)   Resp 16   Ht 5' 1\" (1.549 m)   Wt 134 lb (60.8 kg)   SpO2 93%   BMI 25.32 kg/m²   CONST:  Well developed, thin elderly ill appearing  female who appears of stated age. Awake, alert and cooperative. Tachypneic at rest  HEENT:   Head- Normocephalic, atraumatic   Eyes- Conjunctivae pink, anicteric  Throat- Oral mucosa pink and moist  Neck-  No stridor, trachea midline, + JVD. No carotid bruit. CHEST: Chest symmetrical and non-tender to palpation. No accessory muscle use or intercostal retractions  RESPIRATORY: Lung sounds - crackles in the bases, on NC O2   CARDIOVASCULAR:     Heart Ausculation- IRRR. II/VI VANDA. PV: Trace BLE edema. + BLE compression hose on. No varicosities. Pedal pulses palpable, no clubbing or cyanosis   ABDOMEN: Soft, non-tender to light palpation. Bowel sounds present. No palpable masses no organomegaly; no abdominal bruit  MS: Good muscle strength and tone. No atrophy or abnormal movements. : Deferred  SKIN: Warm and moist, no statis dermatitis or ulcers   NEURO / PSYCH: Oriented to person, place and time. Speech clear and appropriate. Follows all commands. Pleasant affect     DATA:    ECG AF RVR  Tele strips: AF RVR    Diagnostic:    CTA Chest W 3/23/3021: Focal nonocclusive thrombus involving a 1st order branch vessel to the left lower lobe.  Moderate bilateral pleural effusions left greater than right with adjacent atelectasis. Large hiatal hernia. Cardiomegaly. Intake/Output Summary (Last 24 hours) at 3/24/2021 0719  Last data filed at 3/23/2021 1746  Gross per 24 hour   Intake    Output 1100 ml   Net -1100 ml       Labs:   CBC:   Recent Labs     03/23/21  1405 03/24/21  0426   WBC 10.0 7.7   HGB 11.4* 11.6   HCT 37.6 38.3    154     BMP:   Recent Labs     03/23/21  1405 03/24/21  0426    140   K 3.3* 3.1*   CO2 33* 36*   BUN 22 17   CREATININE 0.7 0.7   LABGLOM >60 >60   CALCIUM 8.7 8.3*     Mag:   Recent Labs     03/23/21  1405 03/24/21  0426   MG 2.0 2.2     TSH:   Recent Labs     03/23/21  1405   TSH 1.420     proBNP:   Recent Labs     03/23/21  1405   PROBNP 2,755*     CARDIAC ENZYMES:  Recent Labs     03/23/21  1405   TROPONINI <0.01     FASTING LIPID PANEL:  Lab Results   Component Value Date    CHOL 146 11/05/2020    HDL 89 11/05/2020    LDLCALC 46 11/05/2020    TRIG 56 11/05/2020     LIVER PROFILE:  Recent Labs     03/23/21  1405 03/24/21  0426   AST 24 22   ALT 26 22   LABALBU 3.6 3.5   A&P per Dr Jetty Councilman  Electronically signed by Tiffany Peterson. MARIAMA Tracy on 3/24/2021 at 7:19 AM     I independently performed an evaluation on the patient. I have reviewed the above documentation completed by the advance practitioner. Please see my additional contributions to the HPI, physical exam, assessment and medical decision making. Physical Exam   /75   Pulse 99   Temp 97.4 °F (36.3 °C) (Temporal)   Resp 16   Ht 5' 1\" (1.549 m)   Wt 134 lb (60.8 kg)   SpO2 93%   BMI 25.32 kg/m²   Constitutional: Oriented to person, place, and time. Well-developed and well-nourished. No distress. Head: Normocephalic and atraumatic. Eyes: EOM are normal. Pupils are equal, round, and reactive to light. Neck: Normal range of motion. Neck supple. No hepatojugular reflux and no JVD present. Carotid bruit is not present. No tracheal deviation present. No thyromegaly present. Cardiovascular: Normal rate, regular rhythm, normal heart sounds and intact distal pulses. Exam reveals no gallop and no friction rub. No murmur heard. Pulmonary/Chest: Effort normal and breath sounds normal. No respiratory distress. No wheezes. No rales. No tenderness. Abdominal: Soft. Bowel sounds are normal. No distension and no mass. No tenderness. No rebound and no guarding. Musculoskeletal: Normal range of motion. No edema and no tenderness. Lymphadenopathy:   No cervical adenopathy. Neurological: Alert and oriented to person, place, and time. Skin: Skin is warm and dry. No rash noted. Not diaphoretic. No erythema. Psychiatric: Normal mood and affect. Behavior is normal.     See accompanying documentation for full consult. ASSESSMENT AND PLAN:  1. New onset Atrial fibrillation:    Rate control. Lovenox for now. Echo. Replete K.    2. Acute CHF: Echo pending.     Continue lasix IV/aldactone/BB.     3. PE: CTA chest suggest PE. On anticoagulation. 4. Murmur: Echo pending. 5. COPD: On home oxygen.      Santos Pham D.O.   Cardiologist  Cardiology, [facility]

## 2021-03-24 NOTE — CARE COORDINATION
Spoke with patient. She is from [facility] in [place]. She is from their medical wing and requires hands on care for transfers. She does wear oxygen, 3 liters and has nebulizer. Facility does plan to accept back when stable and is able to provide 24 hour nursing care. They will transport back to the facility. Plans for no new needs when released. For questions I can be reached at [phone].  Katheryn Katz

## 2021-03-24 NOTE — PROGRESS NOTES
[facility] Inpatient   Resident Progress Note    S:  Hospital day: 1   Brief Synopsis: Faye Molina is a 80 y.o. female with pmhx of HTN, CHRISTOPHER, chronic respiratory failure, CHF who presented with shortness of breath and tachycardia. Overnight/interim:    Patient is short of breath this morning. Nurses state that she was saturating 100% on 3 L, but they moved her and she became tachycardic and her sats dropped to 92. Cont meds:   Scheduled meds:    calcium elemental  500 mg Oral BID    furosemide  40 mg Oral BID    isosorbide mononitrate  30 mg Oral Daily    leflunomide  20 mg Oral Daily    metoprolol tartrate  25 mg Oral BID    predniSONE  5 mg Oral Daily    spironolactone  12.5 mg Oral Daily    sodium chloride flush  10 mL Intravenous 2 times per day    famotidine (PEPCID) injection  20 mg Intravenous Daily    enoxaparin  40 mg Subcutaneous Daily    budesonide-formoterol  2 puff Inhalation BID     PRN meds: sodium chloride flush, albuterol, melatonin, perflutren lipid microspheres, sodium chloride flush, polyethylene glycol, acetaminophen **OR** acetaminophen, potassium chloride, magnesium sulfate     I reviewed the patient's past medical and surgical history, Medications and Allergies. O:  /75   Pulse 99   Temp 97.4 °F (36.3 °C) (Temporal)   Resp 16   Ht 5' 1\" (1.549 m)   Wt 134 lb (60.8 kg)   SpO2 93%   BMI 25.32 kg/m²   24 hour I&O: I/O last 3 completed shifts:  In: -   Out: 1100 [Urine:1100]  No intake/output data recorded. Physical Exam   Constitutional: She is oriented to person, place, and time. She appears well-developed and well-nourished. HENT:   Head: Normocephalic and atraumatic. Eyes: Pupils are equal, round, and reactive to light. EOM are normal.   Neck: No JVD present. Cardiovascular: An irregularly irregular rhythm present. Tachycardia present. Pulmonary/Chest: Tachypnea noted.    Patient in mild respiratory distress after being adjusted in bed this morning   Abdominal: Soft. Bowel sounds are normal. There is no abdominal tenderness. Musculoskeletal:         General: No tenderness or edema. Neurological: She is alert and oriented to person, place, and time. Skin: Skin is warm and dry. Labs:  Na/K/Cl/CO2:  140/3.1/96/36 (03/24 2208)  BUN/Cr/glu/ALT/AST/amyl/lip:  17/0.7/--/22/22/--/-- (03/24 0426)  WBC/Hgb/Hct/Plts:  7.7/11.6/38.3/154 (03/24 0426)  estimated creatinine clearance is 46 mL/min (based on SCr of 0.7 mg/dL). Other pertinent labs as noted below    Radiology:  [address] DVT   Final Result   Within the visualized vessels there is no evidence for deep venous   thrombosis               CTA CHEST W CONTRAST   Final Result   Focal nonocclusive thrombus involving a 1st order branch vessel to the left   lower lobe. Moderate bilateral pleural effusions left greater than right with adjacent   atelectasis. Large hiatal hernia. Cardiomegaly. Findings discussed on 03/23/2021 at 5:28 p.m. with Dr. Mandy Buchanan. XR CHEST PORTABLE   Final Result   Bilateral opacities notable in the lung bases suggestive of pneumonia,   atelectasis, and likely bilateral pleural effusions.              A/P:  Active Problems:    Senile osteoporosis    History of compression fracture of spine    Acute respiratory failure with hypoxia and hypercapnia (HCC)    Thrombus- left lower lobe    Pleural effusion    Atrial fibrillation with rapid ventricular response (HCC)    Elevated brain natriuretic peptide (BNP) level  Resolved Problems:    Pleural effusion- bilateral    Acute Hypoxic respiratory failure likely 2/2 Pleural effusions vs. Thrombus vs. CHF exacerbation  Presented RA <90% o2 saturation, breathing on RA  Pro-BNP: 2,755  CXR: bilateral pleural effusions; 40 mg Lasix IV given  CTA: thrombus non-occlusive; lovenox 60 mg given in ED  Diurese with 20 mg lasix will monitor I&Os, BMP qd Cardiology consulted; is on ARB/B-blocker/lasix at [facility]  Pulmonology consulted; plan for lovenox to resume for now     Non-occlusive thrombus 1st order branch vessel left lower lobe  CTA revealed thrombus in ER  Presented 1 week hx of SOB  1 dose lovenox 60 mg given in ER  Pulmonology consulted  Supplemental oxygen and c/w monitoring oxygen status     Bilateral Pleural Effusions likely 2/2 CHF exacerbation vs. Thrombus vs. PNA  Lasix PO 20 mg  AC with lovenox  Pulmonology consulted  PNA w/up- procalcitonin, viral panel, urine legionella, urine strep pneumo  Held on ABx  Continue to monitor oxygen status, wean as tolerated     Afib with RVR versus atrial flutter with variable conduction  Given metoprolol 25 mg in ER  Admit to telemetry  Continue to monitor, consider diltiazem, EP consult     CHF   Pro-BNP: 2,755  Home CHF medications; b-blocker (held), ARB, did not tolerate aldactone was started on it, lasix 20 mg- increase 40 mg if weight >138 lbs  Cardiology consulted  Strict I&Os      COPD   Breo-Ellipta continued  PRN albuterol    GI/DVT ppx: Lovenox  Diet:  Regular diet      Electronically signed by Lynne Umaña  PGY-1 on 3/24/2021 at 6:56 AM  This case was discussed with attending physician: Dr. Matilde Zambrano

## 2021-03-25 PROBLEM — I35.0 AORTIC STENOSIS: Status: ACTIVE | Noted: 2021-01-01

## 2021-03-25 NOTE — PROGRESS NOTES
[facility] Inpatient   Resident Progress Note    S:  Hospital day: 2   Brief Synopsis: Emmanuel Taylor is a 80 y.o. female with pmhx of HTN, CHRISTOPHER, chronic respiratory failure, CHF who presented with shortness of breath and tachycardia. Overnight/interim:    Patient still conversationally dyspneic and has HANSON, denies chest pain, fever, chills, abdominal pain, cough          Cont meds:   Scheduled meds:    enoxaparin  1 mg/kg Subcutaneous BID    furosemide  40 mg Intravenous BID    spironolactone  25 mg Oral Daily    [Held by provider] calcium elemental  500 mg Oral BID    isosorbide mononitrate  30 mg Oral Daily    [Held by provider] leflunomide  20 mg Oral Daily    metoprolol tartrate  25 mg Oral BID    sodium chloride flush  10 mL Intravenous 2 times per day    budesonide-formoterol  2 puff Inhalation BID     PRN meds: perflutren lipid microspheres, perflutren lipid microspheres, sodium chloride flush, albuterol, melatonin, sodium chloride flush, polyethylene glycol, acetaminophen **OR** acetaminophen, potassium chloride, magnesium sulfate     I reviewed the patient's past medical and surgical history, Medications and Allergies. O:  /63   Pulse 100   Temp 97.2 °F (36.2 °C) (Temporal)   Resp 16   Ht 5' 1\" (1.549 m)   Wt 136 lb (61.7 kg)   SpO2 93%   BMI 25.70 kg/m²   24 hour I&O: I/O last 3 completed shifts: In: 780 [P.O.:780]  Out: 1400 [Urine:1400]  I/O this shift:  In: 120 [P.O.:120]  Out: 500 [Urine:500]        Physical Exam   Constitutional: She is oriented to person, place, and time. She appears well-developed and well-nourished. HENT:   Head: Normocephalic and atraumatic. Eyes: Pupils are equal, round, and reactive to light. EOM are normal.   Neck: No JVD present. Cardiovascular: An irregularly irregular rhythm present. Tachycardia present. Murmur heard. Pulmonary/Chest: Effort normal and breath sounds normal.   Abdominal: Soft.  Bowel sounds are normal. There is no abdominal tenderness. Musculoskeletal:         General: No tenderness or edema. Neurological: She is alert and oriented to person, place, and time. Skin: Skin is warm and dry. Labs:  Na/K/Cl/CO2:  145/4.0/100/37 (03/25 9283)  BUN/Cr/glu/ALT/AST/amyl/lip:  19/0.7/--/19/21/--/-- (03/25 6661)  WBC/Hgb/Hct/Plts:  8.7/11.6/38.9/172 (03/25 4709)  estimated creatinine clearance is 47 mL/min (based on SCr of 0.7 mg/dL). Other pertinent labs as noted below    Radiology:  [address] DVT   Final Result   Within the visualized vessels there is no evidence for deep venous   thrombosis               CTA CHEST W CONTRAST   Final Result   Focal nonocclusive thrombus involving a 1st order branch vessel to the left   lower lobe. Moderate bilateral pleural effusions left greater than right with adjacent   atelectasis. Large hiatal hernia. Cardiomegaly. Findings discussed on 03/23/2021 at 5:28 p.m. with Dr. Ansley Harrison. XR CHEST PORTABLE   Final Result   Bilateral opacities notable in the lung bases suggestive of pneumonia,   atelectasis, and likely bilateral pleural effusions.              A/P:  Active Problems:    Senile osteoporosis    History of compression fracture of spine    Acute respiratory failure with hypoxia and hypercapnia (HCC)    Thrombus- left lower lobe    Pleural effusion    Atrial fibrillation with rapid ventricular response (HCC)    Elevated brain natriuretic peptide (BNP) level    COPD (chronic obstructive pulmonary disease) (HCC)  Resolved Problems:    Pleural effusion- bilateral    Acute Hypoxic respiratory failure likely 2/2 Pleural effusions vs. Thrombus vs. CHF exacerbation  Presented RA <90% o2 saturation, breathing on RA  Pro-BNP: 2,755  CXR: bilateral pleural effusions; 40 mg Lasix IV given  CTA: thrombus non-occlusive; lovenox 60 mg given in ED  Diurese with 20 mg lasix will monitor I&Os, BMP qd  Cardiology consulted; is on ARB/B-blocker/lasix at [address] for lovenox to resume for now     Non-occlusive thrombus 1st order branch vessel left lower lobe  CTA revealed thrombus in ER  Presented 1 week hx of SOB  1 dose lovenox 60 mg given in ER  Pulmonology consulted  Supplemental oxygen and c/w monitoring oxygen status     Bilateral Pleural Effusions likely 2/2 CHF exacerbation vs. Thrombus vs. PNA  Lasix PO 20 mg  AC with lovenox  Pulmonology consulted  PNA w/up- procalcitonin, viral panel, urine legionella, urine strep pneumo  Held on ABx  Continue to monitor oxygen status, wean as tolerated     Afib with RVR versus atrial flutter with variable conduction  Given metoprolol 25 mg in ER  Admit to telemetry  Continue to monitor, consider diltiazem, EP consult     CHF   Pro-BNP: 2,755  Home CHF medications; b-blocker (held), ARB, did not tolerate aldactone was started on it, lasix 20 mg- increase 40 mg if weight >138 lbs  Cardiology consulted, echo pending  Strict I&Os      COPD   Breo-Ellipta continued  PRN albuterol    GI/DVT ppx: Lovenox  Diet:  Regular diet      Electronically signed by Sue Lugo  PGY-1 on 3/25/2021 at 9:53 AM  This case was discussed with attending physician: Dr. Travis Devlin

## 2021-03-25 NOTE — PROGRESS NOTES
[facility]  Family Medicine Attending    S: 80 y.o. female admitted yesterday with shortness of breath. Was found to have a fib/flutter with RVR in low 100's, pleural effusions and/or infiltrate, and non-occlusive thrombus of left lower lobe, in addition to her other long-standing co-morbidities. She feels less short of breath since admission. Is alert and oriented. O:   VS- Blood pressure 117/63, pulse 100, temperature 97.2 °F (36.2 °C), temperature source Temporal, resp. rate 16, height 5' 1\" (1.549 m), weight 136 lb (61.7 kg), SpO2 93 %, not currently breastfeeding. Exam is as noted by resident. Speaking in full sentences. Well oriented  Neck supple, no bruits or thyromegaly  Heart irregularly irregular, rate controlled at 636;  Loud systolic murmur  Lungs diminished breath sounds  Abd supple,  No tenderness or organomegaly  No leg edema or tenderness      Impressions: Active Problems:    Osteoporosis     History of compression fracture of spine    Acute respiratory failure with hypoxia and hypercapnia (HCC) improving    Thrombus- left lower lobe    Pleural effusion    Atrial flutter/fibrillation with rapid ventricular response (HCC)    Elevated brain natriuretic peptide (BNP) level    COPD (chronic obstructive pulmonary disease) (HCC)    Asthma     Loud systolic murmur         Plan:    Echocardiogram   Suggest discontinuing oral calcium supplement since, in the past, she has developed an excessively high base excess while on calcium. Current Facility-Administered Medications   Medication Dose Route Frequency Provider Last Rate Last Admin    perflutren lipid microspheres (DEFINITY) injection 1.65 mg  1.5 mL Intravenous ONCE PRN Lidia Jacobs MD        perflutren lipid microspheres (DEFINITY) injection 1.65 mg  1.5 mL Intravenous ONCE PRN Prudence Lucas. MARIAMA Kinsey        enoxaparin (LOVENOX) injection 60 mg  1 mg/kg Subcutaneous BID Prudence Lucas.  MARIAMA Kinsey   60 mg at 03/25/21 2030

## 2021-03-25 NOTE — PROGRESS NOTES
FM resident paged to make aware that pts HR is staying between 110-150. Stated pt was asymptomatic. Asked if they wanted to add anything prn, or if evening dose of lopressor should be given early. FM resident stated ok to give evening dose of lopressor early.

## 2021-03-25 NOTE — PROGRESS NOTES
[facility] Cardiology Inpatient Progress Note    Patient is a 80 y.o. female of Haroon Mcdonnell MD seen in hospital follow up. Chief complaint: Afib/CHF    HPI: No CP. Some SOB. Patient Active Problem List   Diagnosis    Cataract, nuclear    Senile osteoporosis    Closed fracture of distal end of radius    Left wrist pain    Post-traumatic osteoarthritis of both hips    DDD (degenerative disc disease), lumbar    History of compression fracture of spine    Decreased bone density    Acute respiratory failure with hypoxia and hypercapnia (HCC)    Thrombus- left lower lobe    Pleural effusion    Atrial fibrillation with rapid ventricular response (HCC)    Elevated brain natriuretic peptide (BNP) level    COPD (chronic obstructive pulmonary disease) (HCC)       Allergies   Allergen Reactions    Cardizem [Diltiazem Hcl] Rash    Nsaids Other (See Comments)     Kidney insufficiency    Tape Blase Shazia Tape] Rash       Current Facility-Administered Medications   Medication Dose Route Frequency Provider Last Rate Last Admin    perflutren lipid microspheres (DEFINITY) injection 1.65 mg  1.5 mL Intravenous ONCE PRN [facility]. Ginder, APN        potassium chloride (KLOR-CON M) extended release tablet 40 mEq  40 mEq Oral TID [facility]. Ginder, APN   40 mEq at 03/24/21 1724    enoxaparin (LOVENOX) injection 60 mg  1 mg/kg Subcutaneous BID [facility].  Babatundeer, APN   60 mg at 03/24/21 2019    furosemide (LASIX) injection 40 mg  40 mg Intravenous BID Filemon Carmona, DO   40 mg at 03/24/21 1724    spironolactone (ALDACTONE) tablet 25 mg  25 mg Oral Daily Filemon Carmona, DO        sodium chloride flush 0.9 % injection 10 mL  10 mL Intravenous PRN Sherif Zelaya, DO   10 mL at 11/89/71 1642    albuterol (PROVENTIL) nebulizer solution 2.5 mg  2.5 mg Nebulization Q6H PRN Stoney Quevdeo MD        calcium elemental (OSCAL) tablet 500 mg  500 mg Oral BID Stoney Quevedo MD   500 mg at 03/24/21 2019    isosorbide mononitrate (IMDUR) extended release tablet 30 mg  30 mg Oral Daily Vera Wagner MD   30 mg at 03/24/21 0800    [Held by provider] leflunomide (ARAVA) tablet 20 mg  20 mg Oral Daily Vera Wagner MD   20 mg at 03/24/21 0758    melatonin tablet 1.5 mg  1.5 mg Oral Nightly PRN Vera Wagner MD        metoprolol tartrate (LOPRESSOR) tablet 25 mg  25 mg Oral BID Vera Wagner MD   25 mg at 03/24/21 2019    sodium chloride flush 0.9 % injection 10 mL  10 mL Intravenous 2 times per day Vera Wagner MD   10 mL at 03/24/21 2019    sodium chloride flush 0.9 % injection 10 mL  10 mL Intravenous PRN Vera Wagner MD        polyethylene glycol (GLYCOLAX) packet 17 g  17 g Oral Daily PRN Vera Wagner MD        acetaminophen (TYLENOL) tablet 650 mg  650 mg Oral Q6H PRN Vera Wagner MD        Or    acetaminophen (TYLENOL) suppository 650 mg  650 mg Rectal Q6H PRN Vera Wagner MD        potassium chloride 10 mEq/100 mL IVPB (Peripheral Line)  10 mEq Intravenous PRN Vera Wagner MD        magnesium sulfate 2000 mg in 50 mL IVPB premix  2,000 mg Intravenous PRN Vera Wagner MD        budesonide-formoterol (SYMBICORT) 80-4.5 MCG/ACT inhaler 2 puff  2 puff Inhalation BID Vera Wagner MD   2 puff at 03/24/21 2132       Review of systems:   Heart: as above   Lungs: as above   Eyes: denies changes in vision or discharge. Ears: denies changes in hearing or pain. Nose: denies epistaxis or masses   Throat: denies sore throat or trouble swallowing. Neuro: denies numbness, tingling, tremors. Skin: denies rashes or itching. : denies hematuria, dysuria   GI: denies vomiting, diarrhea   Psych: denies mood changed, anxiety, depression. Physical Exam   /68   Pulse 92   Temp 97.4 °F (36.3 °C) (Temporal)   Resp 14   Ht 5' 1\" (1.549 m)   Wt 136 lb (61.7 kg)   SpO2 96%   BMI 25.70 kg/m²   Constitutional: Oriented to person, place, and time. No distress.   Well developed. Head: Normocephalic and atraumatic. Neck: Neck supple. No hepatojugular reflux. No JVD present. Carotid bruit is not present. No tracheal deviation present. No thyromegaly present. Cardiovascular: Normal rate, regular rhythm, normal heart sounds. intact distal pulses. No gallop and no friction rub. No murmur heard. Pulmonary: Breath sounds normal. No respiratory distress. No wheezes. No rales. Chest: Effort normal. No tenderness. Abdominal: Soft. Bowel sounds are normal. No distension or mass. No tenderness, rebound or guarding. Musculoskeletal: . No tenderness. No clubbing or cyanosis. Extremitites: Intact distal pulses. No edema  Neurological: Alert and oriented to person, place, and time. Skin: Skin is warm and dry. No rash noted. Not diaphoretic. No erythema. Psychiatric: Normal mood and affect. Behavior is normal.   Lymphadenopathy: No cervical adenopathy. No groin adenopathy. CBC:   Lab Results   Component Value Date    WBC 8.7 03/25/2021    RBC 4.04 03/25/2021    HGB 11.6 03/25/2021    HCT 38.9 03/25/2021    MCV 96.3 03/25/2021    MCH 28.7 03/25/2021    MCHC 29.8 03/25/2021    RDW 13.9 03/25/2021     03/25/2021    MPV 10.9 03/25/2021     BMP:   Lab Results   Component Value Date     03/25/2021    K 4.0 03/25/2021     03/25/2021    CO2 37 03/25/2021    BUN 19 03/25/2021    LABALBU 3.4 03/25/2021    CREATININE 0.7 03/25/2021    CALCIUM 8.7 03/25/2021    GFRAA >60 03/25/2021    LABGLOM >60 03/25/2021     Magnesium:    Lab Results   Component Value Date    MG 2.2 03/24/2021     Cardiac Enzymes:   Lab Results   Component Value Date    TROPONINI <0.01 03/23/2021      PT/INR:  No results found for: PROTIME, INR  TSH:    Lab Results   Component Value Date    TSH 1.420 03/23/2021     Rhythm Strip: atrial fibrillation.     ASSESSMENT & PLAN:    Patient Active Problem List   Diagnosis    Cataract, nuclear    Senile osteoporosis    Closed fracture of distal end of radius    Left wrist pain    Post-traumatic osteoarthritis of both hips    DDD (degenerative disc disease), lumbar    History of compression fracture of spine    Decreased bone density    Acute respiratory failure with hypoxia and hypercapnia (HCC)    Thrombus- left lower lobe    Pleural effusion    Atrial fibrillation with rapid ventricular response (HCC)    Elevated brain natriuretic peptide (BNP) level    COPD (chronic obstructive pulmonary disease) ([facility] [address]     1. New onset Atrial fibrillation:    Rate control. Lovenox for now. Echo pending. Replete K.    2. Acute CHF: Echo pending.     Continue lasix IV/aldactone/BB.     3. PE: CTA chest suggest PE. On anticoagulation. 4. Murmur: Echo pending. 5. COPD: On home oxygen.      Briseyda Guardado D.O.   Cardiologist  Cardiology, [facility]

## 2021-03-26 NOTE — PROGRESS NOTES
[facility] Cardiology Inpatient Progress Note    Patient is a 80 y.o. female of Iván Colon MD seen in hospital follow up. Chief complaint: Afib/CHF    HPI: No CP. Some SOB. Patient Active Problem List   Diagnosis    Cataract, nuclear    Senile osteoporosis    Closed fracture of distal end of radius    Left wrist pain    Post-traumatic osteoarthritis of both hips    DDD (degenerative disc disease), lumbar    History of compression fracture of spine    Decreased bone density    Acute respiratory failure with hypoxia and hypercapnia (HCC)    Thrombus- left lower lobe    Pleural effusion    Atrial fibrillation with rapid ventricular response (HCC)    Elevated brain natriuretic peptide (BNP) level    COPD (chronic obstructive pulmonary disease) (HCC)    Aortic stenosis       Allergies   Allergen Reactions    Cardizem [Diltiazem Hcl] Rash    Nsaids Other (See Comments)     Kidney insufficiency    Tape Marina Zaleski Tape] Rash       Current Facility-Administered Medications   Medication Dose Route Frequency Provider Last Rate Last Admin    perflutren lipid microspheres (DEFINITY) injection 1.65 mg  1.5 mL Intravenous ONCE PRN Arleen Yepez MD        perflutren lipid microspheres (DEFINITY) injection 1.65 mg  1.5 mL Intravenous ONCE PRN Cydne Se. MARIAMA Kinsey        enoxaparin (LOVENOX) injection 60 mg  1 mg/kg Subcutaneous BID Cydne Se.  Tio APN   60 mg at 03/25/21 2057    furosemide (LASIX) injection 40 mg  40 mg Intravenous BID Lee Cabrera, DO   40 mg at 03/25/21 1759    spironolactone (ALDACTONE) tablet 25 mg  25 mg Oral Daily Lee Cabrera, DO   25 mg at 03/25/21 9196    sodium chloride flush 0.9 % injection 10 mL  10 mL Intravenous PRN Sherif Zelaya, DO   10 mL at 35/22/00 1642    albuterol (PROVENTIL) nebulizer solution 2.5 mg  2.5 mg Nebulization Q6H PRN Arleen Yepez MD        [Held by provider] calcium elemental (OSCAL) tablet 500 mg  500 mg Oral BID Nanci Kimball, MD   500 mg at 03/25/21 0835    isosorbide mononitrate (IMDUR) extended release tablet 30 mg  30 mg Oral Daily Mikeal Epley, MD   30 mg at 03/25/21 0835    [Held by provider] leflunomide (ARAVA) tablet 20 mg  20 mg Oral Daily Mikeal Epley, MD   20 mg at 03/24/21 0758    melatonin tablet 1.5 mg  1.5 mg Oral Nightly PRN Mikeal Epley, MD        metoprolol tartrate (LOPRESSOR) tablet 25 mg  25 mg Oral BID Mikeal Epley, MD   25 mg at 03/25/21 1955    sodium chloride flush 0.9 % injection 10 mL  10 mL Intravenous 2 times per day Mikeal Epley, MD   10 mL at 03/25/21 2059    sodium chloride flush 0.9 % injection 10 mL  10 mL Intravenous PRN Mikeal Epley, MD        polyethylene glycol (GLYCOLAX) packet 17 g  17 g Oral Daily PRN Mikeal Epley, MD        acetaminophen (TYLENOL) tablet 650 mg  650 mg Oral Q6H PRN Mikeal Epley, MD        Or    acetaminophen (TYLENOL) suppository 650 mg  650 mg Rectal Q6H PRN Mikeal Epley, MD        potassium chloride 10 mEq/100 mL IVPB (Peripheral Line)  10 mEq Intravenous PRN Mikeal Epley, MD        magnesium sulfate 2000 mg in 50 mL IVPB premix  2,000 mg Intravenous PRN Mikeal Epley, MD        budesonide-formoterol (SYMBICORT) 80-4.5 MCG/ACT inhaler 2 puff  2 puff Inhalation BID Mikeal Epley, MD   2 puff at 03/25/21 2057       Review of systems:   Heart: as above   Lungs: as above   Eyes: denies changes in vision or discharge. Ears: denies changes in hearing or pain. Nose: denies epistaxis or masses   Throat: denies sore throat or trouble swallowing. Neuro: denies numbness, tingling, tremors. Skin: denies rashes or itching. : denies hematuria, dysuria   GI: denies vomiting, diarrhea   Psych: denies mood changed, anxiety, depression.     Physical Exam   /72   Pulse 85   Temp 98.7 °F (37.1 °C) (Oral)   Resp 16   Ht 5' 1\" (1.549 m)   Wt 130 lb (59 kg)   SpO2 96%   BMI 24.56 kg/m²   Constitutional: Oriented to person, place, and time. No distress. Well developed. Head: Normocephalic and atraumatic. Neck: Neck supple. No hepatojugular reflux. No JVD present. Carotid bruit is not present. No tracheal deviation present. No thyromegaly present. Cardiovascular: Normal rate, regular rhythm, normal heart sounds. intact distal pulses. No gallop and no friction rub. No murmur heard. Pulmonary: Breath sounds normal. No respiratory distress. No wheezes. No rales. Chest: Effort normal. No tenderness. Abdominal: Soft. Bowel sounds are normal. No distension or mass. No tenderness, rebound or guarding. Musculoskeletal: . No tenderness. No clubbing or cyanosis. Extremitites: Intact distal pulses. No edema  Neurological: Alert and oriented to person, place, and time. Skin: Skin is warm and dry. No rash noted. Not diaphoretic. No erythema. Psychiatric: Normal mood and affect. Behavior is normal.   Lymphadenopathy: No cervical adenopathy. No groin adenopathy. CBC:   Lab Results   Component Value Date    WBC 6.4 03/26/2021    RBC 4.03 03/26/2021    HGB 11.9 03/26/2021    HCT 39.1 03/26/2021    MCV 97.0 03/26/2021    MCH 29.5 03/26/2021    MCHC 30.4 03/26/2021    RDW 13.8 03/26/2021     03/26/2021    MPV 10.7 03/26/2021     BMP:   Lab Results   Component Value Date     03/26/2021    K 4.0 03/26/2021    CL 97 03/26/2021    CO2 46 03/26/2021    BUN 20 03/26/2021    LABALBU 3.4 03/26/2021    CREATININE 0.8 03/26/2021    CALCIUM 9.0 03/26/2021    GFRAA >60 03/26/2021    LABGLOM >60 03/26/2021     Magnesium:    Lab Results   Component Value Date    MG 2.2 03/24/2021     Cardiac Enzymes:   Lab Results   Component Value Date    TROPONINI <0.01 03/23/2021      PT/INR:  No results found for: PROTIME, INR  TSH:    Lab Results   Component Value Date    TSH 1.420 03/23/2021     Rhythm Strip: atrial fibrillation. Echo Summary 3/25/2021:   Ejection fraction is visually estimated at 70%.    No regional wall motion abnormalities Cardiologist  Cardiology, [facility]

## 2021-03-26 NOTE — CARE COORDINATION
Patient had ABGs this am. Plan remains return to [address] when released. Therapy ordered today. She is on 4 liters, baseline is 3 liters. Facility will transport back when stable. For questions I can be reached at [phone].  Theodora Stokes, [place]

## 2021-03-26 NOTE — PROGRESS NOTES
[facility] Inpatient   Resident Progress Note    S:  Hospital day: 3   Brief Synopsis: Sondra Charles is a 80 y.o. female with pmhx of HTN, CHRISTOPHER, chronic respiratory failure, CHF who presented with shortness of breath and tachycardia. Overnight/interim:    Patient tacky overnight into the 150s at times   Hypercapnic this morning, ABG ordered          Cont meds:   Scheduled meds:    enoxaparin  1 mg/kg Subcutaneous BID    furosemide  40 mg Intravenous BID    spironolactone  25 mg Oral Daily    [Held by provider] calcium elemental  500 mg Oral BID    isosorbide mononitrate  30 mg Oral Daily    [Held by provider] leflunomide  20 mg Oral Daily    metoprolol tartrate  25 mg Oral BID    sodium chloride flush  10 mL Intravenous 2 times per day    budesonide-formoterol  2 puff Inhalation BID     PRN meds: perflutren lipid microspheres, perflutren lipid microspheres, sodium chloride flush, albuterol, melatonin, sodium chloride flush, polyethylene glycol, acetaminophen **OR** acetaminophen, potassium chloride, magnesium sulfate     I reviewed the patient's past medical and surgical history, Medications and Allergies. O:  BP (!) 107/50   Pulse 83   Temp 97.9 °F (36.6 °C) (Oral)   Resp 18   Ht 5' 1\" (1.549 m)   Wt 136 lb (61.7 kg)   SpO2 95%   BMI 25.70 kg/m²   24 hour I&O: I/O last 3 completed shifts: In: 760 [P.O.:760]  Out: 1900 [Urine:1900]  I/O this shift: In: 48 [P.O.:50]  Out: 150 [Urine:150]        Physical Exam   Constitutional: She is oriented to person, place, and time. She appears well-developed and well-nourished. HENT:   Head: Normocephalic and atraumatic. Eyes: Pupils are equal, round, and reactive to light. EOM are normal.   Neck: No JVD present. Cardiovascular: An irregularly irregular rhythm present. Tachycardia present. Murmur heard. Pulmonary/Chest: Effort normal and breath sounds normal.   Abdominal: Soft.  Bowel sounds are normal. There is no abdominal ARB/B-blocker/lasix at [address] recommend stopping Lovenox, starting Eliquis for A. fib    Hypercapnia  -Pulmonology consulted, await recs  -Patient declines BiPAP     Non-occlusive thrombus 1st order branch vessel left lower lobe  CTA revealed thrombus in ER  Presented 1 week hx of SOB  1 dose lovenox 60 mg given in ER  Switch Lovenox to Eliquis  Pulmonology consulted  Supplemental oxygen and c/w monitoring oxygen status     Bilateral Pleural Effusions likely 2/2 CHF exacerbation vs. Thrombus vs. PNA  Lasix PO 20 mg  AC with lovenox  Pulmonology consulted  PNA w/up- procalcitonin, viral panel, urine legionella, urine strep pneumo  Held on ABx  Continue to monitor oxygen status, wean as tolerated     Afib with RVR versus atrial flutter with variable conduction  Given metoprolol 25 mg in ER  Admit to telemetry  Cardiology following, recommend Eliquis for anticoagulation     CHF   Pro-BNP: 2,755  Home CHF medications; b-blocker (held), ARB, did not tolerate aldactone was started on it, lasix 20 mg- increase 40 mg if weight >138 lbs  Cardiology consulted  Echo shows severe aortic stenosis, CTS consulted for possible valve replacement   -May need outpatient clinic evaluation given her current PE/A. fib/CHF issues  Strict I&Os      COPD   Breo-Ellipta continued  PRN albuterol    GI/DVT ppx: Lovenox  Diet:  Regular diet      Electronically signed by Nain Ponce  PGY-1 on 3/26/2021 at 6:40 AM  This case was discussed with attending physician: Dr. Merrick Newton

## 2021-03-26 NOTE — PROGRESS NOTES
Consult received  Full note to follow  Attempted to see patient but she was unavailable at the time  Dr Nico Redd to see tomorrow am on rounds

## 2021-03-26 NOTE — PROGRESS NOTES
[address] [place] [address] Attending    S: 80 y.o. female admitted  with shortness of breath. Was found to have a fib/flutter with RVR in low 100's, pleural effusions and/or infiltrate, and non-occlusive thrombus of left lower lobe, in addition to her other long-standing co-morbidities. She feels less short of breath since admission. Eating well and is oriented. Walks to bathroom with assistance. O:   VS- Blood pressure 124/72, pulse 85, temperature 98.7 °F (37.1 °C), temperature source Oral, resp. rate 16, height 5' 1\" (1.549 m), weight 130 lb (59 kg), SpO2 96 %, not currently breastfeeding. Exam is as noted by resident. Speaking in full sentences. Neck supple, no bruits or thyromegaly  Heart irregularly irregular, rate controlled at 85;  Loud systolic murmur  Lungs diminished breath sounds  Abd supple,  No tenderness or organomegaly  No leg edema or tenderness      Impressions: Active Problems:    Osteoporosis     History of compression fracture of spine    Acute respiratory failure with hypoxia and hypercapnia (HCC) improving    Thrombus- left lower lobe    Pleural effusion    Atrial flutter/fibrillation with rapid ventricular response (HCC)    Elevated brain natriuretic peptide (BNP) level    COPD (chronic obstructive pulmonary disease) (HCC)    Asthma     Metabolic alkalosis    Loud systolic murmur  Echocardiogram showing severe aortic stenosis and mitral calcification         Plan:    Conferral with cardiology and CTS      Current Facility-Administered Medications   Medication Dose Route Frequency Provider Last Rate Last Admin    apixaban (ELIQUIS) tablet 2.5 mg  2.5 mg Oral BID Jaye Shi,         perflutren lipid microspheres (DEFINITY) injection 1.65 mg  1.5 mL Intravenous ONCE PRN Alejandra Payne MD        perflutren lipid microspheres (DEFINITY) injection 1.65 mg  1.5 mL Intravenous ONCE PRN Gloria Garrett.  MARIAMA Kinsey        furosemide (LASIX) injection 40 mg  40 mg resident(s). I personally reviewed and performed key elements of the history and exam.  I agree with the assessment, plan and orders as documented by the resident. Please refer to the resident note for additional information.       Haroon Mcdonnell

## 2021-03-26 NOTE — CONSULTS
Pulmonary [facility]                             Pulmonary Consult/Progress Note :          Patient: Yelitza Houston  MRN: [id number]  : 1932      Date of Admission: .3/23/2021  1:43 PM    Consulting Physician:Dr Juan Bucio        Reason for Consultation:Acute hypoxic respiratory failure   CC : SOB   HPI:   Yelitza Houston is a 80y.o. year old who states she never smoke in the past and on 2 L home O2 ,She has also history of DVT, CHRISTOPHER,She Presents with a 1 week history of worsening SOB. She resides at the [facility]. SOB is worsened with getting up, movement, going to the bathroom, and has difficulty lying flat because of the shortness of breath. EMS was contacted has her SOB worsened, as per the EMS, patient was found to have an irregular heart rhythm by the NH staff and she arrived with no supplemental oxygen on, with pulse ox 79% and was tachycardic. Associated symptoms or swelling in her legs, feels that her right leg has been chronically larger than her left leg is wearing compression stockings at the NH. She denies feeling that her heart is racing, denies cough, congestion, sore throat, chest pain, fevers, chills, or abdominal pain.       Patient follows cardiology, Dr. Ricardo Mccauley, for CHF, volume overload, murmur. 2021 clinic note, states she had lost 20 pounds on p.o. diuretic, is also to be on an ARB/B-Blocker. Echocardiogram was ordered for the patient, has not been done as of yet. She states she is on 3 L NC at the facility, is unsure if it is for her COPD, but has been on it for only a few months.   ED Course: The patient remained unchanged. Labs Hb: 11.4, K: 3.3, pro-BNP: 2,755, pH: 7.404,   Imaging was CTA Chest: thrombus (non-occlusive) left lower lobe, moderate bilateral pleural effusions, hiatal hernia, cardiomegaly, CXR: bilateral opacities, pneumonia, atelectasis  EKG:  Afib with RVR, rate: 102 BPM  Patient was given 40 mg lasix, lopressor 25 mg, potassium chloride, lovenox 60 mg  Pt admitted for bilateral pleural effusions, thrombus left lower lobe, acute hypoxic respiratory failure         PAST MEDICAL HISTORY:   Past Medical History:   Diagnosis Date    Arthritis     Asthma     Calf DVT (deep venous thrombosis) [facility] rt leg    1998    COPD (chronic obstructive pulmonary disease) ([facility])     Hemorrhoids, internal     Hx of blood clots     Hypertension     Polymyalgia ([address]     PONV (postoperative nausea and vomiting)     Renal insufficiency     Rosacea keratitis     Shingles     Sleep apnea 05/01/2014    bi-pap @ night    UTI (lower urinary tract infection) 06/12/2013    Volume overload        PAST SURGICAL HISTORY:   Past Surgical History:   Procedure Laterality Date    CATARACT REMOVAL WITH IMPLANT Right 7 8 13    CATARACT REMOVAL WITH IMPLANT Left 09/09/13    CHOLECYSTECTOMY      COLONOSCOPY      DILATION AND CURETTAGE OF UTERUS  2011    hysteroscopy    ENDOMETRIAL BIOPSY      FRACTURE SURGERY      FRACTURE SURGERY  1995    Rt.  Wrist    KYPHOSIS SURGERY      LOBECTOMY  rt upper    OTHER SURGICAL HISTORY Left 9-15-15    ORIF left wrist    VASCULAR SURGERY  2014    endovenous laser for leaking    VERTEBROPLASTY         FAMILY HISTORY:   Family History   Problem Relation Age of Onset    Heart Disease Mother     Kidney Disease Father     Heart Disease Sister     Cancer Brother        SOCIAL HISTORY:   Social History     Socioeconomic History    Marital status: Single     Spouse name: Not on file    Number of children: Not on file    Years of education: Not on file    Highest education level: Not on file   Occupational History    Not on file   Social Needs    Financial resource strain: Not on file    Food insecurity     Worry: Not on file     Inability: Not on file    Transportation needs     Medical: Not on file     Non-medical: Not on file   Tobacco Use    Smoking status: Never Smoker    Smokeless tobacco: Never Used   Substance and Sexual Activity    Alcohol use: No    Drug use: No    Sexual activity: Not on file   Lifestyle    Physical activity     Days per week: Not on file     Minutes per session: Not on file    Stress: Not on file   Relationships    Social connections     Talks on phone: Not on file     Gets together: Not on file     Attends Oriental orthodox service: Not on file     Active member of club or organization: Not on file     Attends meetings of clubs or organizations: Not on file     Relationship status: Not on file    Intimate partner violence     Fear of current or ex partner: Not on file     Emotionally abused: Not on file     Physically abused: Not on file     Forced sexual activity: Not on file   Other Topics Concern    Not on file   Social History Narrative    Not on file     Social History     Tobacco Use   Smoking Status Never Smoker   Smokeless Tobacco Never Used     Social History     Substance and Sexual Activity   Alcohol Use No     Social History     Substance and Sexual Activity   Drug Use No         HOME MEDICATIONS:  Prior to Admission medications    Medication Sig Start Date End Date Taking? Authorizing Provider   furosemide (LASIX) 40 MG tablet Take 1 tablet by mouth 2 times daily Take 2 tablets daily unless weight is below 138lbs then take 1 tablet daily 3/23/21  Yes Steve Garciaitri, DO   fluticasone-vilanterol (BREO ELLIPTA) 100-25 MCG/INH AEPB inhaler Inhale into the lungs daily   Yes Historical Provider, MD   spironolactone (ALDACTONE) 25 MG tablet Take 12.5 mg by mouth daily   Yes Historical Provider, MD   ipratropium (ATROVENT) 0.03 % nasal spray by Each Nostril route as needed for Rhinitis Use as directed as needed   Yes Historical Provider, MD   Menthol, Topical Analgesic, (BIOFREEZE) 4 % GEL Apply topically Apply topically to affected area as directed.    Yes Historical Provider, MD   leflunomide (ARAVA) 20 MG tablet Take 1 tablet by mouth daily 3/22/21 mouth daily    Yes Historical Provider, MD   therapeutic multivitamin-minerals (THERAGRAN-M) tablet Take 1 tablet by mouth daily.      Yes Historical Provider, MD       CURRENT MEDICATIONS:  Current Facility-Administered Medications: apixaban (ELIQUIS) tablet 2.5 mg, 2.5 mg, Oral, BID  perflutren lipid microspheres (DEFINITY) injection 1.65 mg, 1.5 mL, Intravenous, ONCE PRN  perflutren lipid microspheres (DEFINITY) injection 1.65 mg, 1.5 mL, Intravenous, ONCE PRN  furosemide (LASIX) injection 40 mg, 40 mg, Intravenous, BID  spironolactone (ALDACTONE) tablet 25 mg, 25 mg, Oral, Daily  sodium chloride flush 0.9 % injection 10 mL, 10 mL, Intravenous, PRN  albuterol (PROVENTIL) nebulizer solution 2.5 mg, 2.5 mg, Nebulization, Q6H PRN  [Held by provider] calcium elemental (OSCAL) tablet 500 mg, 500 mg, Oral, BID  isosorbide mononitrate (IMDUR) extended release tablet 30 mg, 30 mg, Oral, Daily  [Held by provider] leflunomide (ARAVA) tablet 20 mg, 20 mg, Oral, Daily  melatonin tablet 1.5 mg, 1.5 mg, Oral, Nightly PRN  metoprolol tartrate (LOPRESSOR) tablet 25 mg, 25 mg, Oral, BID  sodium chloride flush 0.9 % injection 10 mL, 10 mL, Intravenous, 2 times per day  sodium chloride flush 0.9 % injection 10 mL, 10 mL, Intravenous, PRN  polyethylene glycol (GLYCOLAX) packet 17 g, 17 g, Oral, Daily PRN  acetaminophen (TYLENOL) tablet 650 mg, 650 mg, Oral, Q6H PRN **OR** acetaminophen (TYLENOL) suppository 650 mg, 650 mg, Rectal, Q6H PRN  potassium chloride 10 mEq/100 mL IVPB (Peripheral Line), 10 mEq, Intravenous, PRN  magnesium sulfate 2000 mg in 50 mL IVPB premix, 2,000 mg, Intravenous, PRN  budesonide-formoterol (SYMBICORT) 80-4.5 MCG/ACT inhaler 2 puff, 2 puff, Inhalation, BID    IV MEDICATIONS:      ALLERGIES:  Allergies   Allergen Reactions    Cardizem [Diltiazem Hcl] Rash    Nsaids Other (See Comments)     Kidney insufficiency    Tape Ludwig Patterson Tape] Rash       REVIEW OF SYSTEMS:  General ROS:  + weight loss ,no fatigue ENT ROS:   No Sore throat ,no lymphoadenopathy,no nasal stuffiness     Hematological and Lymphatic ROS:   No ecchymosis ,no tendency to bleed  Respiratory ROS:   SOB and HANSON   Cardiovascular ROS:   No CP,No Palpitation   Gastrointestinal ROS:   No Gi bleed,no nausea or vomiting      - Musculoskeletal ROS:      - no joint swelling ,no joint pain   Neurological ROS:     -no weakness or numbness    Dermatological ROS:   No skin rash ,no urticaria     PHYSICAL EXAMINATION:     VITAL SIGNS:  /72   Pulse 85   Temp 98.7 °F (37.1 °C) (Oral)   Resp 16   Ht 5' 1\" (1.549 m)   Wt 130 lb (59 kg)   SpO2 96%   BMI 24.56 kg/m²   Wt Readings from Last 3 Encounters:   03/26/21 130 lb (59 kg)   02/25/21 134 lb (60.8 kg)   01/29/21 133 lb 3.2 oz (60.4 kg)     Temp Readings from Last 3 Encounters:   03/26/21 98.7 °F (37.1 °C) (Oral)   03/26/19 98 °F (36.7 °C) (Oral)   09/08/17 97.6 °F (36.4 °C) (Oral)     TMAX:  BP Readings from Last 3 Encounters:   03/26/21 124/72   02/25/21 122/70   01/29/21 (!) 88/52     Pulse Readings from Last 3 Encounters:   03/26/21 85   02/25/21 87   01/29/21 79           INTAKE/OUTPUTS:  I/O last 3 completed shifts:   In: 18 [P.O.:860]  Out: 2150 [Urine:2150]    Intake/Output Summary (Last 24 hours) at 3/26/2021 1238  Last data filed at 3/26/2021 0654  Gross per 24 hour   Intake 740 ml   Output 1350 ml   Net -610 ml       General Appearance: alert and oriented to person, place and time, well-developed and   well-nourished, in no acute distress   Eyes: pupils equal, round, and reactive to light, extraocular eye movements intact, conjunctivae normal and sclera anicteric   Neck: neck supple and non tender without mass, no thyromegaly, no thyroid nodules and no cervical adenopathy   Pulmonary/Chest:rhonchi bilateral   Cardiovascular: normal rate, regular rhythm, normal S1 and S2, no murmurs, rubs, clicks or gallops, distal pulses intact, no carotid bruits, no murmurs, no gallops, no carotid bruits and no JVD   Abdomen: obese, soft, non-tender, non-distended, normal bowel sounds, no masses or organomegaly   Extremities:no edema ,but she get Lasix already  Musculoskeletal: normal range of motion, no joint swelling, deformity or tenderness   Neurologic: reflexes normal and symmetric, no cranial nerve deficit noted    LABS/IMAGING:    CBC:  Lab Results   Component Value Date    WBC 6.4 03/26/2021    HGB 11.9 03/26/2021    HCT 39.1 03/26/2021    MCV 97.0 03/26/2021     03/26/2021    LYMPHOPCT 13.6 (L) 03/26/2021    RBC 4.03 03/26/2021    MCH 29.5 03/26/2021    MCHC 30.4 (L) 03/26/2021    RDW 13.8 03/26/2021    NEUTOPHILPCT 67.6 03/26/2021    MONOPCT 15.0 (H) 03/26/2021    BASOPCT 0.8 03/26/2021    NEUTROABS 4.33 03/26/2021    LYMPHSABS 0.87 (L) 03/26/2021    MONOSABS 0.96 (H) 03/26/2021    EOSABS 0.16 03/26/2021    BASOSABS 0.05 03/26/2021       Recent Labs     03/26/21  0533 03/25/21 0439 03/24/21 0426   WBC 6.4 8.7 7.7   HGB 11.9 11.6 11.6   HCT 39.1 38.9 38.3   MCV 97.0 96.3 97.0    172 154       BMP:   Recent Labs     03/24/21 0426 03/25/21 0439 03/26/21  0533    145 145   K 3.1* 4.0 4.0   CL 96* 100 97*   CO2 36* 37* 46*   PHOS  --   --  4.3   BUN 17 19 20   CREATININE 0.7 0.7 0.8       MG:   Lab Results   Component Value Date    MG 2.2 03/24/2021     Ca/Phos:   Lab Results   Component Value Date    CALCIUM 9.0 03/26/2021    PHOS 4.3 03/26/2021     Amylase: No results found for: AMYLASE  Lipase: No results found for: LIPASE  LIVER PROFILE:   Recent Labs     03/24/21  0426 03/25/21  0439 03/26/21  0533   AST 22 21 19   ALT 22 19 17   BILITOT 0.6 0.6 0.6   ALKPHOS 55 57 53       PT/INR: No results for input(s): PROTIME, INR in the last 72 hours. APTT: No results for input(s): APTT in the last 72 hours.     Cardiac Enzymes:  Lab Results   Component Value Date    TROPONINI <0.01 03/23/2021                 PROBLEM LIST:  Patient Active Problem List   Diagnosis    Cataract, nuclear    Senile osteoporosis    Closed fracture of distal end of radius    Left wrist pain    Post-traumatic osteoarthritis of both hips    DDD (degenerative disc disease), lumbar    History of compression fracture of spine    Decreased bone density    Acute respiratory failure with hypoxia and hypercapnia (HCC)    Thrombus- left lower lobe    Pleural effusion    Atrial fibrillation with rapid ventricular response (HCC)    Elevated brain natriuretic peptide (BNP) level    COPD (chronic obstructive pulmonary disease) (HCC)    Aortic stenosis               ASSESSMENT:  1.) Acute hypoxic/hypercapnic respiratory failure  2. )CHF  3.)Pulmonary embolism   4.)scoliosis bs spine deformity  5.)A fib with RVR  6 Pleural effusion       PLAN:  *patient SOB multifactorial ,she declined smoking but she has for sure CHF and PE as well as Spine deformity that make SOB worse     *-Agree with Diuresis ,She may need IV   *-wean O2 ,base line 2 L  *-BD   *_Agree with eliquis   *- May benefit from BiPAP 12/6 while in hospital   *- sleep study as OP as she will benefit from CPAP ,will check if she already have it  *- I will look with US to assess pleural fluid and if large enough will drain     Will follow along      Thank you very much for allowing me to participate in the care of this pleasant patient , should you have any questions ,please do not hesitate to contact me      Kirstin Reis MD,[facility]  Pulmonary&Critical Care Medicine   Director of [address] [place] [address] Director of [address] [place] [address]    Eric Uribe    NOTE: This report was transcribed using voice recognition software. Every effort was made to ensure accuracy; however, inadvertent computerized transcription errors may be present.

## 2021-03-26 NOTE — PROGRESS NOTES
Occupational Therapy  OCCUPATIONAL THERAPY INITIAL EVALUATION        Date:3/26/2021  Patient Name: Megan Talamantes  MRN: [id number]  : 1932  Room: [address] [place] [address]    Referring Physician:  Royal Janis MD    Evaluating OT:  MAYA Quach, OTR/L #[id number]    AM-PAC Daily Activity Raw Score:  14/24  Recommended Adaptive Equipment:  TBD as pt progresses     Reason for Admission:  Pt was admitted w/ SOB, Tachycardia, A Fib, CO2 critical levels (+) Small PE    Diagnosis:     1. Acute respiratory failure with hypoxia and hypercapnia (HCC)    2. Acute congestive heart failure, unspecified heart failure type ([address]    3. Other acute pulmonary embolism without acute cor pulmonale ([facility]    4. Single subsegmental pulmonary embolism without acute cor pulmonale (HCC)      Procedures this admission:  None     Pertinent Medical History:  Arthritis, Kyphotic Posture, COPD, HTN, Right Wrist Fx, ORIF Left Wrist, Vertebroplasty     Precautions:  Falls  4L O2  Rivera Catheter  Carb Control/Low Na Diet    Home Living: Pt lives in an [facility] in [place]. Bathroom setup:  Walk-in-Shower, Standard Commode   Equipment owned:  [employer], [employer], [employer], [employer], [employer]    Available Family Assist:   staff assist    Prior Level of Function:  Pt receives assist w/ Dressing and Bathing, Reports being IND with Toileting, Transfers and Mobility using her Rollator for ambulation.    Driving:  No  Occupation:  Nun    Pain Level:  Denies Pain    Additional Complaints:  SOB, fatigue    Vitals/Lab Values:  O2 sats difficult to obtain - appeared to remain > 90% for duration of session w/ 4L      Cognition: A & O x 3 - generally oriented   Able to Follow Multi-Step Commands w/ occasional Min VCs   Memory:  good (-)   Sequencing:  good (-)   Problem solving:  good (-)   Judgement/safety:  good (-)  Additional Comments:  Pt was pleasant, cooperative, particular about method of task completion       Functional Assessment: Initial Eval Status  Date: 3-26-21 Treatment Status  Date: Short Term/Long Term Goals  Treatment frequency: PRN 1-3 x/week   1-2 weeks   Feeding IND after set up      NA   Grooming Min A    Able to wash hands standing at sink, Min A for standing balance, unable to tolerate standing for further ax    SUP  Standing At The Sink   UB Dressing Mod A    Min A to thread UEs, Max A to wrap garment around back - seated    Min A   LB Dressing Max A    Max A to don socks - unable to use cross-legged tech, Max A to thread LEs into undergarment, Min A for standing balance, Able to pull pants over hips    Pt ed for safety, adaptive techs/equip    Mod A   Bathing NT      Mod A   Toileting Mod A    Rivera Catheter  Max A for bowel hygiene, Min A for standing balance, Able to pull pants over hips    Min A   Bed Mobility  Rolling:  NT  Repositioning:  NT   Supine to Sit:  NT    Sit to Supine:  NT     OOB during session   IND   Functional Transfers Sit to stand:  Min A  Stand to sit:  Min A      Commode, Chair  Pt ed re: safety/hand placement    SUP   Functional Mobility Min A w/ [employer]    Short distances at b/s and in bathroom w/ [employer], Min VCs for walker safety, PLB    SUP   Balance Sitting:      Static:  Remote SUP Chair, Commode    Dynamic:  Close SUP w/ functional ax     Standing:      Static:  Min A w/ [employer]    Dynamic:  Min A w/ functional ax/mobility w/ [employer]       Activity Tolerance Fair  Limited by [employer], SOB, General Weakness    Tolerated Sitting:   On commode > 10 mins w/ functional ax, Chair ~ 30 mins at a time    Tolerated Standing:  ~ 6 mins w/ functional ax/mobility        Visual/  Perceptual WFL  Glasses:  Yes      Hearing WFL  Hearing Aids  Yes       Hand dominance: Right    UE ROM: RUE:  WFL Kyphotic posture limiting shoulder flex     LUE:  WFL    Strength: RUE: grossly 4-/5     LUE: grossly 4-/5     Strength:  WFL Bill UEs    Fine Motor Coordination:  WFL Bill UEs    Sensation:  Denies numbness or tingling Bill UEs Tone:  WFL Bill UEs  Edema:  None Noted                            Comments: Upon arrival, patient was found seated on toilet in bathroom. She was agreeable to participate in therapeutic ax. No Family present during session. Received permission from RN prior to engaging pt in OT services. At the end of the session, patient was properly positioned in b/s chair. Call light and phone within reach, all lines and tubes intact. Oriented pt to call bell. Made all appropriate Environmental Modifications to facilitate pt's level of IND and safety. All needs met. RN made aware of pt's position in chair         Overall patient demonstrated decreased independence and safety during completion of ADL/functional transfer/mobility tasks. Pt would benefit from continued skilled OT to increase safety and independence with completion of ADL/IADL tasks for functional independence and quality of life. Treatment:      Provided Skilled SUP/Assist w/ Pt safety, Proper Positioning, ADLs, Functional Transfers and Functional Mobility as noted above, as well as set up and clean up for session. Skilled monitoring of Vitals and pts response to treatment.   Consulted RN    Education:      Provided Pt/Family ed re: Purpose of OT services;  OT Plan of Care;     ADL-  Instruction/training on use of DME/AD/Adaptive equip/techs to improve safety/IND with Functional Ax    Mobility-  Instruction/training on safety and improved independence with bed mobility, functional transfers, functional mobility, walker safety    Activity tolerance - Instruction/training on energy conservation/work simplification, techs to increase endurance for completion of Functional Ax    Cognitive retraining -  Oriented pt to current Date, Place and Situation; Cues for safety during Functional Ax for safety, improved safety awareness, sequencing, problem solving   Skilled positioning/alignment to maximize Pt's ability to [facility] [place] interact w/ his/her environment, maximize respiratory status   Skilled monitoring of Vitals during session and pt's response to tx ax   Techs for improved Safety/Safety Awareness w/ Functional Activity/Mobility   Energy Conservation (EC) Techs/Pursed-Lip Breathing (PLB)     Recommendations for Continued Participation in OT services during Hospitalization and at D/C - SNF     Made all appropriate Environmental Modifications to facilitate pt's level of IND and safety. Pt and/or Family verbalized/demonstrated a Good(-) understanding of education provided. Will Review PRN. Assessment of current deficits   Functional mobility [x]  ADLs [x] Strength [x]  Cognition []  Functional transfers  [x] IADLs [x] Safety Awareness [x]  Endurance [x]  Fine Motor Coordination [] Balance [x] Vision/perception [] Sensation []   Gross Motor Coordination [] ROM [x] Delirium []                  Motor Control []      Plan of Care: OT 1-3 x/week for 1-2 weeks PRN   [x] ADL retraining/AE, Equipment Needs/Recommendations   [x] Energy Conservation Techniques/Strategies      [] Neuromuscular Re-Education      [x] Functional Transfer Training         [x] Functional Mobility Training          [] Cognitive Re-Training          [x] Splinting/Positioning Needs           [x] Therapeutic Activity   [x]Therapeutic Exercise   [] Visual/Perceptual   [] Delirium Prevention/Treatment   [x] Positioning to Improve Functional [place], Safety, and Skin Integrity   [x] Patient and/or Family Education to Increase Safety and Functional [place]   [x] Environmental Modifications  [x] Compensatory techniques for ADLs   [x] Other:       Pt would benefit from continued skilled OT services to increase safety and independence with completion of ADL/IADL tasks for functional independence and quality of life. Pt/Family actively participated in the establishment of goals.     Rehab Potential:  Fair(+) for established goals    Patient / Family Goal:  Return to [facility] Patient and/or Family were instructed on Functional Diagnosis, Prognosis/Goals and OT Plan of Care. Demonstrated Good(-) understanding. Evaluation Time includes thorough review of current medical information, gathering information on past medical history/social history and prior level of function, completion of standardized testing/informal observation of tasks, assessment of data and education on plan of care and goals.      Eval Complexity: Low  Profile and History - Mod  Assessment of Occupational Performance and Identification of Deficits - Mod  Clinical Decision Making - Low     Time In:  1865              Time Out:  4282  Total Treatment Time:  30 minutes      Treatment Charges: Mins Units   OT Eval Low 97165 X 1   OT Eval Medium 13685     OT Eval High 99914     OT Re-Eval [id number]     Therapeutic Ex  86814     Therapeutic Activities 98706     ADL/Self Care 63784 30 2   Neuro Re-ed 96897     Orthotic manage/training  77803     Non-Billable Time     Total Timed Treatment 30 [address], OTR/L  # [id number]

## 2021-03-26 NOTE — PLAN OF CARE
Problem: Falls - Risk of:  Goal: Will remain free from falls  Description: Will remain free from falls  3/26/2021 8590 by Letitia Kimball RN  Outcome: Met This Shift  3/26/2021 0048 by Letitia Kimball RN  Outcome: Met This Shift  Goal: Absence of physical injury  Description: Absence of physical injury  3/26/2021 5368 by Letitia Kimball RN  Outcome: Met This Shift  3/26/2021 0048 by Letitia Kimball RN  Outcome: Met This Shift

## 2021-03-27 NOTE — PROGRESS NOTES
[facility] Inpatient   Resident Progress Note    S:  Hospital day: 4   Brief Synopsis: Monika Goodson is a 80 y.o. female with pmhx of HTN, CHRISTOPHER, chronic respiratory failure, CHF who presented with shortness of breath and tachycardia. No acute events overnight, awaiting CTS to evaluate patient. Started on eliquis day prior. She refused bipap- was uncomfortable, no CP, SOB, cough, n/v, swelling in legs. Appetite is good      Cont meds:   Scheduled meds:    apixaban  2.5 mg Oral BID    furosemide  40 mg Intravenous BID    spironolactone  25 mg Oral Daily    [Held by provider] calcium elemental  500 mg Oral BID    isosorbide mononitrate  30 mg Oral Daily    [Held by provider] leflunomide  20 mg Oral Daily    metoprolol tartrate  25 mg Oral BID    sodium chloride flush  10 mL Intravenous 2 times per day    budesonide-formoterol  2 puff Inhalation BID     PRN meds: perflutren lipid microspheres, perflutren lipid microspheres, sodium chloride flush, albuterol, melatonin, sodium chloride flush, polyethylene glycol, acetaminophen **OR** acetaminophen, potassium chloride, magnesium sulfate     I reviewed the patient's past medical and surgical history, Medications and Allergies. O:  /61   Pulse 91   Temp 97.7 °F (36.5 °C) (Temporal)   Resp 14   Ht 5' 1\" (1.549 m)   Wt 130 lb (59 kg)   SpO2 97%   BMI 24.56 kg/m²   24 hour I&O: I/O last 3 completed shifts: In: 390 [P.O.:390]  Out: 1600 [Urine:1600]  I/O this shift:  In: 100 [P.O.:100]  Out: 425 [Urine:425]        Physical Exam   Constitutional: She is oriented to person, place, and time. She appears well-developed and well-nourished. HENT:   Head: Normocephalic and atraumatic. Eyes: Pupils are equal, round, and reactive to light. EOM are normal.   Neck: No JVD present. Cardiovascular: An irregularly irregular rhythm present. Tachycardia present. Murmur heard.   Pulmonary/Chest: Effort normal and breath sounds normal. Abdominal: Soft. Bowel sounds are normal. There is no abdominal tenderness. Musculoskeletal:         General: No tenderness or edema. Neurological: She is alert and oriented to person, place, and time. Skin: Skin is warm and dry. Labs:  Na/K/Cl/CO2:  136/3.6/91/38 (03/27 0451)  BUN/Cr/glu/ALT/AST/amyl/lip:  25/0.7/--/17/19/--/-- (03/27 0451)  WBC/Hgb/Hct/Plts:  7.6/12.1/40.0/173 (03/27 0451)  estimated creatinine clearance is 46 mL/min (based on SCr of 0.7 mg/dL). Other pertinent labs as noted below    Radiology:  [address] W DVT   Final Result   Within the visualized vessels there is no evidence for deep venous   thrombosis               CTA CHEST W CONTRAST   Final Result   Focal nonocclusive thrombus involving a 1st order branch vessel to the left   lower lobe. Moderate bilateral pleural effusions left greater than right with adjacent   atelectasis. Large hiatal hernia. Cardiomegaly. Findings discussed on 03/23/2021 at 5:28 p.m. with Dr. Delia Garcia. XR CHEST PORTABLE   Final Result   Bilateral opacities notable in the lung bases suggestive of pneumonia,   atelectasis, and likely bilateral pleural effusions.              A/P:  Active Problems:    Acute respiratory failure with hypoxia and hypercapnia (HCC)    Aortic stenosis    Thrombus- left lower lobe    Pleural effusion    Atrial fibrillation with rapid ventricular response (HCC)    Elevated brain natriuretic peptide (BNP) level    Senile osteoporosis    History of compression fracture of spine    COPD (chronic obstructive pulmonary disease) (HCC)  Resolved Problems:    Pleural effusion- bilateral    Acute Hypoxic respiratory failure likely 2/2 Pleural effusions vs. Thrombus vs. CHF exacerbation  Presented RA <90% o2 saturation, breathing on RA  Pro-BNP: 2,755  CXR: bilateral pleural effusions; 40 mg Lasix IV given  CTA: thrombus non-occlusive; lovenox 60 mg given in ED  Diurese with 20 mg lasix will monitor I&Os, BMP qd  Cardiology consulted; is on ARB/B-blocker/lasix at [address] recommend stopping Lovenox, starting Eliquis for A.  Fib    Severe AS  Awaiting CTS reccomendations  Currently AC with eliquis    Hypercapnia- continue to monitor likely 2/2 respiratory hypoventilation, bipap non-compliance  -Pulmonology consulted, await recs  -Patient declines BiPAP  - C/W Symbicort BID     Non-occlusive thrombus 1st order branch vessel left lower lobe  CTA revealed thrombus in ER  Presented 1 week hx of SOB  1 dose lovenox 60 mg given in ER  Switch Lovenox to Eliquis  Continues to be [facility] with Eliquis  Pulmonology consulted  Supplemental oxygen and c/w monitoring oxygen status     Bilateral Pleural Effusions likely 2/2 CHF exacerbation vs. Thrombus vs. PNA  Lasix PO 40 mg BID- down 5 lbs from admit  PNA w/up- procalcitonin, viral panel, urine legionella, urine strep pneumo  Held on ABx  Continue to monitor oxygen status, wean as tolerated  Pulmonology plan for thoracentesis possibly     Afib with RVR versus atrial flutter with variable conduction  Rate control Lopressor 25 mg BID  AC with Eliquis     CHF   Pro-BNP: 2,755  Home CHF medications; b-blocker (held), ARB, did not tolerate aldactone was started on it, lasix 20 mg- increase 40 mg if weight >138 lbs  Cardiology consulted  Echo shows severe aortic stenosis, CTS consulted for possible valve replacement  May need outpatient clinic evaluation given her current PE/A. fib/CHF issues  Strict I&Os      COPD   Symbicort continued  PRN albuterol    GI/DVT ppx: Eliquis  Diet:  Regular diet      Electronically signed by Vanna Sawant  PGY-2 on 3/27/2021 at 6:37 AM  This case was discussed with attending physician: Dr. Leonardo Gagnon

## 2021-03-27 NOTE — PROGRESS NOTES
[address] [place] [address] Attending    S: 80 y.o. female admitted  with shortness of breath. Was found to have a fib/flutter with RVR in low 100's, pleural effusions and/or infiltrate, and non-occlusive thrombus of left lower lobe, in addition to her other long-standing co-morbidities. She feels less short of breath since admission. Eating well and is oriented. Walks to bathroom with assistance. O:   VS- Blood pressure 104/61, pulse 91, temperature 97.7 °F (36.5 °C), temperature source Temporal, resp. rate 14, height 5' 1\" (1.549 m), weight 130 lb (59 kg), SpO2 97 %, not currently breastfeeding. Exam is as noted by resident. Speaking in full sentences. Neck supple, no bruits or thyromegaly  Heart irregularly irregular, rate controlled at 85;  Loud systolic murmur  Lungs diminished breath sounds  Abd supple,  No tenderness or organomegaly  No leg edema or tenderness      Impressions: Active Problems:    Osteoporosis     History of compression fracture of spine    Acute respiratory failure with hypoxia and hypercapnia (HCC) improving    Thrombus- left lower lobe    Pleural effusion    Atrial flutter/fibrillation with rapid ventricular response (HCC)    Elevated brain natriuretic peptide (BNP) level    COPD (chronic obstructive pulmonary disease) (HCC)    Asthma     Metabolic alkalosis    Loud systolic murmur  Echocardiogram showing severe aortic stenosis and mitral calcification         Plan:    Diurese w/ lasix bid. Cont aldactone and imdur  Afib - eliquis, metoprolol  Pleural effusion - Pulm following.  Consider thoracentesis  Severe AS - CTS consulted      Current Facility-Administered Medications   Medication Dose Route Frequency Provider Last Rate Last Admin    apixaban (ELIQUIS) tablet 2.5 mg  2.5 mg Oral BID Malgorzata Bio, DO   2.5 mg at 03/26/21 2129    perflutren lipid microspheres (DEFINITY) injection 1.65 mg  1.5 mL Intravenous ONCE PRN MD Isak Hayward lipid microspheres (DEFINITY) injection 1.65 mg  1.5 mL Intravenous ONCE PRN Alice Raegan.  MARIAMA Kinsey        furosemide (LASIX) injection 40 mg  40 mg Intravenous BID Prosper Stakes, DO   40 mg at 03/26/21 1830    spironolactone (ALDACTONE) tablet 25 mg  25 mg Oral Daily Prosper Stakes, DO   25 mg at 03/26/21 1046    sodium chloride flush 0.9 % injection 10 mL  10 mL Intravenous PRN Sherif Garciaick, DO   10 mL at 10/38/75 1642    albuterol (PROVENTIL) nebulizer solution 2.5 mg  2.5 mg Nebulization Q6H PRN Joselyn Martino MD        [Held by provider] calcium elemental (OSCAL) tablet 500 mg  500 mg Oral BID Joselyn Martino MD   500 mg at 03/25/21 0835    isosorbide mononitrate (IMDUR) extended release tablet 30 mg  30 mg Oral Daily Joselyn Martino MD   30 mg at 03/26/21 0949    [Held by provider] leflunomide (ARAVA) tablet 20 mg  20 mg Oral Daily Joselyn Martino MD   20 mg at 03/24/21 0758    melatonin tablet 1.5 mg  1.5 mg Oral Nightly PRN Joselyn Martino MD        metoprolol tartrate (LOPRESSOR) tablet 25 mg  25 mg Oral BID Joselyn Martino MD   25 mg at 03/26/21 2129    sodium chloride flush 0.9 % injection 10 mL  10 mL Intravenous 2 times per day Joselyn Martino MD   10 mL at 03/26/21 2133    sodium chloride flush 0.9 % injection 10 mL  10 mL Intravenous PRN Joselyn Martino MD   10 mL at 03/26/21 1046    polyethylene glycol (GLYCOLAX) packet 17 g  17 g Oral Daily PRN Joselyn Martino MD        acetaminophen (TYLENOL) tablet 650 mg  650 mg Oral Q6H PRN Joselyn Martino MD        Or    acetaminophen (TYLENOL) suppository 650 mg  650 mg Rectal Q6H PRN Joselyn Martino MD        potassium chloride 10 mEq/100 mL IVPB (Peripheral Line)  10 mEq Intravenous PRN Joselyn Martino MD        magnesium sulfate 2000 mg in 50 mL IVPB premix  2,000 mg Intravenous PRN Joselyn Martino MD        budesonide-formoterol (SYMBICORT) 80-4.5 MCG/ACT inhaler 2 puff  2 puff Inhalation BID Joselyn Martino MD   2 puff at 03/26/21 2132          Attending Physician Statement  I have reviewed the chart, including any radiology or labs, and seen the patient with the resident(s). I personally reviewed and performed key elements of the history and exam.  I agree with the assessment, plan and orders as documented by the resident. Please refer to the resident note for additional information.       Yon Sesay

## 2021-03-27 NOTE — CONSULTS
Sonny Davis MD        metoprolol tartrate (LOPRESSOR) tablet 25 mg  25 mg Oral BID Sonny Davis MD   25 mg at 03/26/21 2129    sodium chloride flush 0.9 % injection 10 mL  10 mL Intravenous 2 times per day Sonny Davis MD   10 mL at 03/26/21 2133    sodium chloride flush 0.9 % injection 10 mL  10 mL Intravenous PRN Sonny Davis MD   10 mL at 03/26/21 1046    polyethylene glycol (GLYCOLAX) packet 17 g  17 g Oral Daily PRN Sonny Davis MD        acetaminophen (TYLENOL) tablet 650 mg  650 mg Oral Q6H PRN Sonny Davis MD        Or    acetaminophen (TYLENOL) suppository 650 mg  650 mg Rectal Q6H PRN Sonny Davis MD        potassium chloride 10 mEq/100 mL IVPB (Peripheral Line)  10 mEq Intravenous PRN Sonny Davis MD        magnesium sulfate 2000 mg in 50 mL IVPB premix  2,000 mg Intravenous PRN Sonny Davis MD        budesonide-formoterol (SYMBICORT) 80-4.5 MCG/ACT inhaler 2 puff  2 puff Inhalation BID Sonny Davis MD   2 puff at 03/26/21 2132       Past Medical History:  Past Medical History:   Diagnosis Date    Arthritis     Asthma     Calf DVT (deep venous thrombosis) ([facility]) rt leg    1998    COPD (chronic obstructive pulmonary disease) ([address]     Hemorrhoids, internal     Hx of blood clots     Hypertension     [facility]ymyalgia [facility]     PONV (postoperative nausea and vomiting)     Renal insufficiency     Rosacea keratitis     Shingles     Sleep apnea 05/01/2014    bi-pap @ night    UTI (lower urinary tract infection) 06/12/2013    Volume overload        Past Surgical History:  Past Surgical History:   Procedure Laterality Date    CATARACT REMOVAL WITH IMPLANT Right 7 8 13    CATARACT REMOVAL WITH IMPLANT Left 09/09/13    CHOLECYSTECTOMY      COLONOSCOPY      DILATION AND CURETTAGE OF UTERUS  2011    hysteroscopy    ENDOMETRIAL BIOPSY      FRACTURE SURGERY      FRACTURE SURGERY  1995    Rt.  Wrist    KYPHOSIS SURGERY      LOBECTOMY  rt upper    OTHER SURGICAL HISTORY Left 9-15-15    ORIF left wrist    VASCULAR SURGERY  2014    endovenous laser for leaking    VERTEBROPLASTY         Social History:  Social History     Socioeconomic History    Marital status: Single     Spouse name: Not on file    Number of children: Not on file    Years of education: Not on file    Highest education level: Not on file   Occupational History    Not on file   Social Needs    Financial resource strain: Not on file    Food insecurity     Worry: Not on file     Inability: Not on file    Transportation needs     Medical: Not on file     Non-medical: Not on file   Tobacco Use    Smoking status: Never Smoker    Smokeless tobacco: Never Used   Substance and Sexual Activity    Alcohol use: No    Drug use: No    Sexual activity: Not on file   Lifestyle    Physical activity     Days per week: Not on file     Minutes per session: Not on file    Stress: Not on file   Relationships    Social connections     Talks on phone: Not on file     Gets together: Not on file     Attends Jew service: Not on file     Active member of club or organization: Not on file     Attends meetings of clubs or organizations: Not on file     Relationship status: Not on file    Intimate partner violence     Fear of current or ex partner: Not on file     Emotionally abused: Not on file     Physically abused: Not on file     Forced sexual activity: Not on file   Other Topics Concern    Not on file   Social History Narrative    Not on file       Family History:  Family History   Problem Relation Age of Onset    Heart Disease Mother     Kidney Disease Father     Heart Disease Sister     Cancer Brother        Review of Systems:  Constitutional: Denies fevers, chills, or weight loss. HEENT: Denies visual changes or hearing loss. Heart: As per HPI. Lungs: Denies shortness of breath, cough, or wheezing. Gastrointestinal: Denies nausea, vomiting, constipation, or diarrhea.    Genitourinary: clinic       Electronically signed by Reyes Powers DO on 3/27/2021 at 8:52 AM

## 2021-03-27 NOTE — PROGRESS NOTES
[id number] [facility] Cardiology Inpatient Progress Note    Patient is a 80 y.o. female of Iván Colon MD seen in hospital follow up. Chief complaint: Afib/CHF    HPI: No CP. Some SOB. Patient Active Problem List   Diagnosis    Cataract, nuclear    Senile osteoporosis    Closed fracture of distal end of radius    Left wrist pain    Post-traumatic osteoarthritis of both hips    DDD (degenerative disc disease), lumbar    History of compression fracture of spine    Decreased bone density    Acute respiratory failure with hypoxia and hypercapnia (HCC)    Thrombus- left lower lobe    Pleural effusion    Atrial fibrillation with rapid ventricular response (HCC)    Elevated brain natriuretic peptide (BNP) level    COPD (chronic obstructive pulmonary disease) (HCC)    Aortic stenosis       Allergies   Allergen Reactions    Cardizem [Diltiazem Hcl] Rash    Nsaids Other (See Comments)     Kidney insufficiency    Tape [place] Tape] Rash       Current Facility-Administered Medications   Medication Dose Route Frequency Provider Last Rate Last Admin    apixaban (ELIQUIS) tablet 2.5 mg  2.5 mg Oral BID Lee Cabrera, DO   2.5 mg at 03/27/21 0919    perflutren lipid microspheres (DEFINITY) injection 1.65 mg  1.5 mL Intravenous ONCE PRN Arleen Yepze MD        perflutren lipid microspheres (DEFINITY) injection 1.65 mg  1.5 mL Intravenous ONCE PRN Julianae .  MARIAMA Kinsey        furosemide (LASIX) injection 40 mg  40 mg Intravenous BID Lee Cabrera, DO   40 mg at 03/27/21 0920    spironolactone (ALDACTONE) tablet 25 mg  25 mg Oral Daily Lee Cabrera, DO   25 mg at 03/27/21 4078    sodium chloride flush 0.9 % injection 10 mL  10 mL Intravenous PRN Sherif Zelaya, DO   10 mL at 53/70/64 1642    albuterol (PROVENTIL) nebulizer solution 2.5 mg  2.5 mg Nebulization Q6H PRN Arleen Yepez MD        [Held by provider] calcium elemental (OSCAL) tablet 500 mg  500 mg Oral BID Arleen Yepez MD   500 mg at 03/25/21 0835    isosorbide mononitrate (IMDUR) extended release tablet 30 mg  30 mg Oral Daily Arleen Yepez MD   30 mg at 03/27/21 0919    [Held by provider] leflunomide (ARAVA) tablet 20 mg  20 mg Oral Daily Arleen Yepez MD   20 mg at 03/24/21 0758    melatonin tablet 1.5 mg  1.5 mg Oral Nightly PRN Arleen Yepez MD        metoprolol tartrate (LOPRESSOR) tablet 25 mg  25 mg Oral BID Arleen Yepez MD   25 mg at 03/27/21 0919    sodium chloride flush 0.9 % injection 10 mL  10 mL Intravenous 2 times per day Arleen Yepez MD   10 mL at 03/27/21 0919    sodium chloride flush 0.9 % injection 10 mL  10 mL Intravenous PRN Arleen Yepez MD   10 mL at 03/26/21 1046    polyethylene glycol (GLYCOLAX) packet 17 g  17 g Oral Daily PRN Arleen Yepez MD        acetaminophen (TYLENOL) tablet 650 mg  650 mg Oral Q6H PRN Arleen Yepez MD        Or    acetaminophen (TYLENOL) suppository 650 mg  650 mg Rectal Q6H PRN Arleen Yepez MD        potassium chloride 10 mEq/100 mL IVPB (Peripheral Line)  10 mEq Intravenous PRN Arleen Yepez MD        magnesium sulfate 2000 mg in 50 mL IVPB premix  2,000 mg Intravenous PRN Arleen Yepez MD        budesonide-formoterol (SYMBICORT) 80-4.5 MCG/ACT inhaler 2 puff  2 puff Inhalation BID Arleen Yepez MD   2 puff at 03/27/21 6356       Review of systems:   Heart: as above   Lungs: as above   Eyes: denies changes in vision or discharge. Ears: denies changes in hearing or pain. Nose: denies epistaxis or masses   Throat: denies sore throat or trouble swallowing. Neuro: denies numbness, tingling, tremors. Skin: denies rashes or itching. : denies hematuria, dysuria   GI: denies vomiting, diarrhea   Psych: denies mood changed, anxiety, depression.     Physical Exam   BP (!) 98/55   Pulse 110   Temp 96.1 °F (35.6 °C)   Resp 16   Ht 5' 1\" (1.549 m)   Wt 130 lb (59 kg)   SpO2 98%   BMI 24.56 kg/m²   Constitutional: Oriented to person, place, and time. No distress. Well developed. Head: Normocephalic and atraumatic. Neck: Neck supple. No hepatojugular reflux. No JVD present. Carotid bruit is not present. No tracheal deviation present. No thyromegaly present. Cardiovascular: Normal rate, regular rhythm, normal heart sounds. intact distal pulses. No gallop and no friction rub. No murmur heard. Pulmonary: Breath sounds normal. No respiratory distress. No wheezes. No rales. Chest: Effort normal. No tenderness. Abdominal: Soft. Bowel sounds are normal. No distension or mass. No tenderness, rebound or guarding. Musculoskeletal: . No tenderness. No clubbing or cyanosis. Extremitites: Intact distal pulses. No edema  Neurological: Alert and oriented to person, place, and time. Skin: Skin is warm and dry. No rash noted. Not diaphoretic. No erythema. Psychiatric: Normal mood and affect. Behavior is normal.   Lymphadenopathy: No cervical adenopathy. No groin adenopathy. CBC:   Lab Results   Component Value Date    WBC 7.6 03/27/2021    RBC 4.16 03/27/2021    HGB 12.1 03/27/2021    HCT 40.0 03/27/2021    MCV 96.2 03/27/2021    MCH 29.1 03/27/2021    MCHC 30.3 03/27/2021    RDW 13.7 03/27/2021     03/27/2021    MPV 10.8 03/27/2021     BMP:   Lab Results   Component Value Date     03/27/2021    K 3.6 03/27/2021    CL 91 03/27/2021    CO2 38 03/27/2021    BUN 25 03/27/2021    LABALBU 3.5 03/27/2021    CREATININE 0.7 03/27/2021    CALCIUM 8.7 03/27/2021    GFRAA >60 03/27/2021    LABGLOM >60 03/27/2021     Magnesium:    Lab Results   Component Value Date    MG 2.2 03/24/2021     Cardiac Enzymes:   Lab Results   Component Value Date    TROPONINI <0.01 03/23/2021      PT/INR:  No results found for: PROTIME, INR  TSH:    Lab Results   Component Value Date    TSH 1.420 03/23/2021     Rhythm Strip: atrial fibrillation. Echo Summary 3/25/2021:   Ejection fraction is visually estimated at 70%.    No regional wall motion abnormalities

## 2021-03-28 PROBLEM — J96.02 ACUTE RESPIRATORY FAILURE WITH HYPOXIA AND HYPERCAPNIA (HCC): Status: RESOLVED | Noted: 2021-01-01 | Resolved: 2021-01-01

## 2021-03-28 PROBLEM — R79.89 ELEVATED BRAIN NATRIURETIC PEPTIDE (BNP) LEVEL: Status: RESOLVED | Noted: 2021-01-01 | Resolved: 2021-01-01

## 2021-03-28 PROBLEM — I48.19 PERSISTENT ATRIAL FIBRILLATION (HCC): Status: ACTIVE | Noted: 2021-01-01

## 2021-03-28 PROBLEM — I48.91 ATRIAL FIBRILLATION WITH RAPID VENTRICULAR RESPONSE (HCC): Status: RESOLVED | Noted: 2021-01-01 | Resolved: 2021-01-01

## 2021-03-28 PROBLEM — J96.01 ACUTE RESPIRATORY FAILURE WITH HYPOXIA AND HYPERCAPNIA (HCC): Status: RESOLVED | Noted: 2021-01-01 | Resolved: 2021-01-01

## 2021-03-28 NOTE — CARE COORDINATION
Pt to discharge later today. Call placed to [facility] to arrange transport. Will await call back    Pasquale MEEKN, RN  [facility] Case Management  [phone]--Received a call back, they can not accept back today because their pharmacy is closed. They would not be able to fill any new medications also they only have transport Mon-Fri. CM/SW will need to follow up in AM to arrange transport and fax updated discharge med rec.  Charge nurse updated

## 2021-03-28 NOTE — PROGRESS NOTES
Pulmonary, cardiology, and cardiothoracic are ok with patient discharging back to the [facility]

## 2021-03-28 NOTE — PROGRESS NOTES
[address] [facility] Attending    S: 80 y.o. female admitted  with shortness of breath. Was found to have a fib/flutter with RVR in low 100's, pleural effusions and/or infiltrate, and non-occlusive thrombus of left lower lobe, in addition to her other long-standing co-morbidities. She feels less short of breath since admission. Eating well and is oriented. Walks to bathroom with assistance. Says breathing is a little better today    O:   VS- Blood pressure 115/74, pulse 69, temperature 97.4 °F (36.3 °C), temperature source Temporal, resp. rate 16, height 5' 1\" (1.549 m), weight 130 lb (59 kg), SpO2 95 %, not currently breastfeeding. Exam is as noted by resident. Speaking in full sentences. Neck supple, no bruits or thyromegaly  Heart irregularly irregular, rate controlled at 85;  Loud pansystolic murmur  Lungs diminished breath sounds  Abd supple,  No tenderness or organomegaly  No leg edema or tenderness      Impressions: Active Problems:    Osteoporosis     History of compression fracture of spine    Acute respiratory failure with hypoxia and hypercapnia (HCC) improving    Thrombus- left lower lobe    Pleural effusion    Atrial flutter/fibrillation with rapid ventricular response (HCC)    Elevated brain natriuretic peptide (BNP) level    COPD (chronic obstructive pulmonary disease) (HCC)    Asthma     Metabolic alkalosis    Loud systolic murmur  Echocardiogram showing severe aortic stenosis and mitral calcification         Plan:    Diurese w/ lasix bid. Cont aldactone and imdur  Afib - eliquis, metoprolol  Pleural effusion - Pulm following. Consider thoracentesis.  If not, plan for discharge  Severe AS - CTS consulted, can f/u as outpt      Current Facility-Administered Medications   Medication Dose Route Frequency Provider Last Rate Last Admin    apixaban (ELIQUIS) tablet 2.5 mg  2.5 mg Oral BID Williams Cardozo, DO   2.5 mg at 03/27/21 1184    perflutren lipid microspheres (DEFINITY) injection 1.65 mg  1.5 mL Intravenous ONCE PRN Stoney Quevedo MD        perflutren lipid microspheres (DEFINITY) injection 1.65 mg  1.5 mL Intravenous ONCE PRN Sherwin Retana.  MARIAMA Kinsey        furosemide (LASIX) injection 40 mg  40 mg Intravenous BID Filemon Boss, DO   40 mg at 03/27/21 7557    spironolactone (ALDACTONE) tablet 25 mg  25 mg Oral Daily Filemon Glovers, DO   25 mg at 03/27/21 0919    sodium chloride flush 0.9 % injection 10 mL  10 mL Intravenous PRN Sherif Zelaya, DO   10 mL at 16/11/55 1838    albuterol (PROVENTIL) nebulizer solution 2.5 mg  2.5 mg Nebulization Q6H PRN Stoney Quevedo MD        [Held by provider] calcium elemental (OSCAL) tablet 500 mg  500 mg Oral BID Stoney Quevedo MD   500 mg at 03/25/21 0835    isosorbide mononitrate (IMDUR) extended release tablet 30 mg  30 mg Oral Daily Stoney Quevedo MD   30 mg at 03/27/21 0919    [Held by provider] leflunomide (ARAVA) tablet 20 mg  20 mg Oral Daily Stoney Quevedo MD   20 mg at 03/24/21 0758    melatonin tablet 1.5 mg  1.5 mg Oral Nightly PRN Stoney Quevedo MD        metoprolol tartrate (LOPRESSOR) tablet 25 mg  25 mg Oral BID Stoney Quevedo MD   25 mg at 03/27/21 2227    sodium chloride flush 0.9 % injection 10 mL  10 mL Intravenous 2 times per day Stoney Quevedo MD   10 mL at 03/27/21 0919    sodium chloride flush 0.9 % injection 10 mL  10 mL Intravenous PRN Stoney Quevedo MD   10 mL at 03/26/21 1046    polyethylene glycol (GLYCOLAX) packet 17 g  17 g Oral Daily PRN Stoney Quevedo MD        acetaminophen (TYLENOL) tablet 650 mg  650 mg Oral Q6H PRN Stoney Quevedo MD        Or    acetaminophen (TYLENOL) suppository 650 mg  650 mg Rectal Q6H PRN Stoney Quevedo MD        potassium chloride 10 mEq/100 mL IVPB (Peripheral Line)  10 mEq Intravenous PRN Stoney Quevedo MD        magnesium sulfate 2000 mg in 50 mL IVPB premix  2,000 mg Intravenous PRN Stoney Quevedo MD        budesonide-formoterol (SYMBICORT) 80-4.5 MCG/ACT inhaler 2 puff  2 puff Inhalation BID Kenn Hill MD   2 puff at 03/27/21 5318          Attending Physician Statement  I have reviewed the chart, including any radiology or labs, and seen the patient with the resident(s). I personally reviewed and performed key elements of the history and exam.  I agree with the assessment, plan and orders as documented by the resident. Please refer to the resident note for additional information.       Yon Sesay

## 2021-03-28 NOTE — PROGRESS NOTES
[facility] Inpatient   Resident Progress Note    S:  Hospital day: 5   Brief Synopsis: Ronel Delgado is a 80 y.o. female with pmhx of HTN, CHRISTOPHER, chronic respiratory failure, CHF who presented with shortness of breath and tachycardia. Seen at bedside she denies feeling sob, resting on NC, slept well overnight. No cough, fevers, chills, abdomen pain, n/v, fevers. Cont meds:   Scheduled meds:    apixaban  2.5 mg Oral BID    furosemide  40 mg Intravenous BID    spironolactone  25 mg Oral Daily    [Held by provider] calcium elemental  500 mg Oral BID    isosorbide mononitrate  30 mg Oral Daily    [Held by provider] leflunomide  20 mg Oral Daily    metoprolol tartrate  25 mg Oral BID    sodium chloride flush  10 mL Intravenous 2 times per day    budesonide-formoterol  2 puff Inhalation BID     PRN meds: perflutren lipid microspheres, perflutren lipid microspheres, sodium chloride flush, albuterol, melatonin, sodium chloride flush, polyethylene glycol, acetaminophen **OR** acetaminophen, potassium chloride, magnesium sulfate     I reviewed the patient's past medical and surgical history, Medications and Allergies. O:  /70   Pulse 62   Temp 98.1 °F (36.7 °C) (Temporal)   Resp 17   Ht 5' 1\" (1.549 m)   Wt 130 lb (59 kg)   SpO2 94%   BMI 24.56 kg/m²   24 hour I&O: I/O last 3 completed shifts: In: 100 [P.O.:100]  Out: 1825 [Urine:1825]  I/O this shift: In: 0   Out: 550 [Urine:550]        Physical Exam   Constitutional: She is oriented to person, place, and time. She appears well-developed and well-nourished. HENT:   Head: Normocephalic and atraumatic. Eyes: Pupils are equal, round, and reactive to light. EOM are normal.   Neck: No JVD present. Cardiovascular: An irregularly irregular rhythm present. Tachycardia present. Murmur heard. Pulmonary/Chest: Effort normal and breath sounds normal.   Abdominal: Soft. Bowel sounds are normal. There is no abdominal tenderness.

## 2021-03-28 NOTE — PLAN OF CARE
Problem: Skin Integrity:  Goal: Will show no infection signs and symptoms  Description: Will show no infection signs and symptoms  Outcome: Met This Shift  Goal: Absence of new skin breakdown  Description: Absence of new skin breakdown  Outcome: Met This Shift     Problem: Falls - Risk of:  Goal: Will remain free from falls  Description: Will remain free from falls  Outcome: Met This Shift  Goal: Absence of physical injury  Description: Absence of physical injury  Outcome: Met This Shift     Problem: Coping:  Goal: Level of anxiety will decrease  Description: Level of anxiety will decrease  Outcome: Met This Shift     Problem: Respiratory:  Goal: Ability to maintain adequate ventilation will improve  Description: Ability to maintain adequate ventilation will improve  Outcome: Met This Shift     Problem: Skin Integrity:  Goal: Will show no infection signs and symptoms  Description: Will show no infection signs and symptoms  Outcome: Met This Shift  Goal: Absence of new skin breakdown  Description: Absence of new skin breakdown  Outcome: Met This Shift     Problem: Falls - Risk of:  Goal: Will remain free from falls  Description: Will remain free from falls  Outcome: Met This Shift  Goal: Absence of physical injury  Description: Absence of physical injury  Outcome: Met This Shift     Problem: Coping:  Goal: Level of anxiety will decrease  Description: Level of anxiety will decrease  Outcome: Met This Shift     Problem: Respiratory:  Goal: Ability to maintain adequate ventilation will improve  Description: Ability to maintain adequate ventilation will improve  Outcome: Met This Shift

## 2021-03-28 NOTE — PROGRESS NOTES
[facility] Cardiology Inpatient Progress Note    Patient is a 80 y.o. female of Nereyda Lea MD seen in hospital follow up. Chief complaint: Afib/CHF    HPI: No CP. Some SOB. Patient Active Problem List   Diagnosis    Cataract, nuclear    Senile osteoporosis    Closed fracture of distal end of radius    Left wrist pain    Post-traumatic osteoarthritis of both hips    DDD (degenerative disc disease), lumbar    History of compression fracture of spine    Decreased bone density    Acute respiratory failure with hypoxia and hypercapnia (HCC)    Thrombus- left lower lobe    Pleural effusion    Atrial fibrillation with rapid ventricular response (HCC)    Elevated brain natriuretic peptide (BNP) level    COPD (chronic obstructive pulmonary disease) (HCC)    Aortic stenosis       Allergies   Allergen Reactions    Cardizem [Diltiazem Hcl] Rash    Nsaids Other (See Comments)     Kidney insufficiency    Tape Mitcheal [place] Tape] Rash       Current Facility-Administered Medications   Medication Dose Route Frequency Provider Last Rate Last Admin    apixaban (ELIQUIS) tablet 2.5 mg  2.5 mg Oral BID Rajesh Ruffing, DO   2.5 mg at 03/1934    perflutren lipid microspheres (DEFINITY) injection 1.65 mg  1.5 mL Intravenous ONCE PRN Jayden Covington MD        perflutren lipid microspheres (DEFINITY) injection 1.65 mg  1.5 mL Intravenous ONCE PRN Ros Dillard.  MARIAMA Kinsey        furosemide (LASIX) injection 40 mg  40 mg Intravenous BID Rajesh Ruffing, DO   40 mg at 03/28/21 1277    spironolactone (ALDACTONE) tablet 25 mg  25 mg Oral Daily Rajesh Ruffing, DO   25 mg at 03/28/21 9344    sodium chloride flush 0.9 % injection 10 mL  10 mL Intravenous PRN Sherif Zelaya, DO   10 mL at 96/50/06 1838    albuterol (PROVENTIL) nebulizer solution 2.5 mg  2.5 mg Nebulization Q6H PRN Jayden Covington MD        [Held by provider] calcium elemental (OSCAL) tablet 500 mg  500 mg Oral BID Jayden Covington MD   500 mg at place, and time. No distress. Well developed. Head: Normocephalic and atraumatic. Neck: Neck supple. No hepatojugular reflux. No JVD present. Carotid bruit is not present. No tracheal deviation present. No thyromegaly present. Cardiovascular: Normal rate, regular rhythm, normal heart sounds. intact distal pulses. No gallop and no friction rub. No murmur heard. Pulmonary: Breath sounds normal. No respiratory distress. No wheezes. No rales. Chest: Effort normal. No tenderness. Abdominal: Soft. Bowel sounds are normal. No distension or mass. No tenderness, rebound or guarding. Musculoskeletal: . No tenderness. No clubbing or cyanosis. Extremitites: Intact distal pulses. No edema  Neurological: Alert and oriented to person, place, and time. Skin: Skin is warm and dry. No rash noted. Not diaphoretic. No erythema. Psychiatric: Normal mood and affect. Behavior is normal.   Lymphadenopathy: No cervical adenopathy. No groin adenopathy. CBC:   Lab Results   Component Value Date    WBC 6.9 03/28/2021    RBC 4.58 03/28/2021    HGB 13.4 03/28/2021    HCT 44.3 03/28/2021    MCV 96.7 03/28/2021    MCH 29.3 03/28/2021    MCHC 30.2 03/28/2021    RDW 13.5 03/28/2021     03/28/2021    MPV 10.8 03/28/2021     BMP:   Lab Results   Component Value Date     03/28/2021    K 3.6 03/28/2021    CL 91 03/28/2021    CO2 38 03/28/2021    BUN 23 03/28/2021    LABALBU 4.1 03/28/2021    CREATININE 0.8 03/28/2021    CALCIUM 9.0 03/28/2021    GFRAA >60 03/28/2021    LABGLOM >60 03/28/2021     Magnesium:    Lab Results   Component Value Date    MG 2.2 03/24/2021     Cardiac Enzymes:   Lab Results   Component Value Date    TROPONINI <0.01 03/23/2021      PT/INR:  No results found for: PROTIME, INR  TSH:    Lab Results   Component Value Date    TSH 1.420 03/23/2021     Rhythm Strip: atrial fibrillation. Echo Summary 3/25/2021:   Ejection fraction is visually estimated at 70%.    No regional wall motion abnormalities seen. Mild left ventricular concentric hypertrophy noted. Normal right ventricle size with reduced function. TAPSE is 1.4 cm. Left atrial volume index of 58 ml per meters squared BSA. Individual aortic valve leaflets are not clearly visualized. Severe aortic stenosis is present. The peak velocity is 4.8 m/s. The aortic valve area is 0.5 cm2 with a maximum gradient of 92 mmHg and a mean gradient of 67 mmHg. Severe mitral annular calcification. Moderate mitral stenosis is present. Mean transmitral gradient 7.8 mmHg. Mild mitral regurgitation is present. Moderate tricuspid regurgitation. Pulmonary hypertension is moderate. RVSP is 55 mmHg. ASSESSMENT & PLAN:    Patient Active Problem List   Diagnosis    Cataract, nuclear    Senile osteoporosis    Closed fracture of distal end of radius    Left wrist pain    Post-traumatic osteoarthritis of both hips    DDD (degenerative disc disease), lumbar    History of compression fracture of spine    Decreased bone density    Acute respiratory failure with hypoxia and hypercapnia (HCC)    Thrombus- left lower lobe    Pleural effusion    Atrial fibrillation with rapid ventricular response (HCC)    Elevated brain natriuretic peptide (BNP) level    COPD (chronic obstructive pulmonary disease) (HCC)    Aortic stenosis     1. New onset Atrial fibrillation:    Rate control. Eliquis (dose adjusted due to age/weight less than 60 kg) from an afib standpoint. Echo as above. 2. Acute diastolic CHF: Echo as above.     Continue lasix(PO on discharge)/aldactone/BB.     3. VHD: Significant VHD with severe AS/mod MS/mild MR/mod TR/mod PH. Outpatient [facility] evaluation for possible TAVR, hold off for now given PE/afib/CHF issues. 4. PE: CTA chest suggest PE. Pulm consulted. On anticoagulation. Make need to modify eliquis dose based on thromboembolic disease indication. 5. COPD: On home oxygen.      Ira Griggs TAYLOR  Cardiologist  Cardiology, [facility]) Physicians

## 2021-03-28 NOTE — PROGRESS NOTES
Pulmonary [facility]                             Pulmonary Consult/Progress Note :                Reason for Consultation:Acute hypoxic respiratory failure   CC : SOB   HPI:   Patient shortness of breath has improved, she is on 4 L and no signs of distress    She is denies any chest pain, no Fever or chills. .        PHYSICAL EXAMINATION:     VITAL SIGNS:  /71   Pulse 85   Temp 97.3 °F (36.3 °C) (Temporal)   Resp 20   Ht 5' 1\" (1.549 m)   Wt 130 lb (59 kg)   SpO2 98%   BMI 24.56 kg/m²   Wt Readings from Last 3 Encounters:   03/27/21 130 lb (59 kg)   02/25/21 134 lb (60.8 kg)   01/29/21 133 lb 3.2 oz (60.4 kg)     Temp Readings from Last 3 Encounters:   03/28/21 97.3 °F (36.3 °C) (Temporal)   03/26/19 98 °F (36.7 °C) (Oral)   09/08/17 97.6 °F (36.4 °C) (Oral)     TMAX:  BP Readings from Last 3 Encounters:   03/28/21 113/71   02/25/21 122/70   01/29/21 (!) 88/52     Pulse Readings from Last 3 Encounters:   03/28/21 85   02/25/21 87   01/29/21 79           INTAKE/OUTPUTS:  I/O last 3 completed shifts:   In: 360 [P.O.:360]  Out: 1800 [Urine:1800]    Intake/Output Summary (Last 24 hours) at 3/28/2021 1811  Last data filed at 3/28/2021 1441  Gross per 24 hour   Intake 360 ml   Output 1800 ml   Net -1440 ml       General Appearance: alert and oriented to person, place and time, well-developed and   well-nourished, in no acute distress   Eyes: pupils equal, round, and reactive to light, extraocular eye movements intact, conjunctivae normal and sclera anicteric   Neck: neck supple and non tender without mass, no thyromegaly, no thyroid nodules and no cervical adenopathy   Pulmonary/Chest:rhonchi bilateral   Cardiovascular: normal rate, regular rhythm, normal S1 and S2, no murmurs, rubs, clicks or gallops, distal pulses intact, no carotid bruits, no murmurs, no gallops, no carotid bruits and no JVD   Abdomen: obese, soft, non-tender, non-distended, normal bowel sounds, no masses or organomegaly   Extremities:no edema ,but she get Lasix already  Musculoskeletal: normal range of motion, no joint swelling, deformity or tenderness   Neurologic: reflexes normal and symmetric, no cranial nerve deficit noted    LABS/IMAGING:    CBC:  Lab Results   Component Value Date    WBC 6.9 03/28/2021    HGB 13.4 03/28/2021    HCT 44.3 03/28/2021    MCV 96.7 03/28/2021     03/28/2021    LYMPHOPCT 17.1 (L) 03/28/2021    RBC 4.58 03/28/2021    MCH 29.3 03/28/2021    MCHC 30.2 (L) 03/28/2021    RDW 13.5 03/28/2021    NEUTOPHILPCT 64.8 03/28/2021    MONOPCT 14.0 (H) 03/28/2021    BASOPCT 0.6 03/28/2021    NEUTROABS 4.50 03/28/2021    LYMPHSABS 1.19 (L) 03/28/2021    MONOSABS 0.97 (H) 03/28/2021    EOSABS 0.18 03/28/2021    BASOSABS 0.04 03/28/2021       Recent Labs     03/28/21  0503 03/27/21  0451 03/26/21  0533   WBC 6.9 7.6 6.4   HGB 13.4 12.1 11.9   HCT 44.3 40.0 39.1   MCV 96.7 96.2 97.0    173 165       BMP:   Recent Labs     03/26/21  0533 03/26/21  1726 03/27/21  0451 03/28/21  0503    144 136 140   K 4.0 4.2 3.6 3.6   CL 97* 97* 91* 91*   CO2 46* 37* 38* 38*   PHOS 4.3  --   --   --    BUN 20 25* 25* 23   CREATININE 0.8 0.7 0.7 0.8       MG:   Lab Results   Component Value Date    MG 2.2 03/24/2021     Ca/Phos:   Lab Results   Component Value Date    CALCIUM 9.0 03/28/2021    PHOS 4.3 03/26/2021     Amylase: No results found for: AMYLASE  Lipase: No results found for: LIPASE  LIVER PROFILE:   Recent Labs     03/26/21  0533 03/27/21  0451 03/28/21  0503   AST 19 19 22   ALT 17 17 19   BILITOT 0.6 0.5 0.6   ALKPHOS 53 58 63       PT/INR: No results for input(s): PROTIME, INR in the last 72 hours. APTT: No results for input(s): APTT in the last 72 hours.     Cardiac Enzymes:  Lab Results   Component Value Date    TROPONINI <0.01 03/23/2021                 PROBLEM LIST:  Patient Active Problem List   Diagnosis    Cataract, nuclear    Senile osteoporosis    Closed fracture of distal end of radius    Left wrist pain    Post-traumatic osteoarthritis of both hips    DDD (degenerative disc disease), lumbar    History of compression fracture of spine    Decreased bone density    Thrombus- left lower lobe    Pleural effusion    COPD (chronic obstructive pulmonary disease) (HCC)    Aortic stenosis    Persistent atrial fibrillation (HCC)               ASSESSMENT:  1.) Acute hypoxic/hypercapnic respiratory failure  2. )CHF  3.)Pulmonary embolism   4.)scoliosis bs spine deformity  5.)A fib with RVR  6 Pleural effusion       PLAN:  *patient SOB multifactorial ,she declined smoking but she has for sure CHF and PE as well as Spine deformity that make SOB worse    *-Pleural effusion is loculated in the absence of fever and a white cell count below her age and scan benefits I recommended follow-up of the pleural fluid as outpatient and to continue with diuresis and her chest x-ray/CAT scan in 2 to 3 weeks as outpatient  *-wean O2 ,base line 2 L  *-BD   *_On  eliquis   *- May benefit from BiPAP 12/6 while in hospital   *- sleep study as OP as she will benefit from CPAP ,will check if she already have it  *-Fluid loculated and need follow-up as outpatient    Will follow along      Thank you very much for allowing me to participate in the care of this pleasant patient , should you have any questions ,please do not hesitate to contact me      Kirstin Reis MD,[facility]  Pulmonary&Critical Care Medicine   Director of [address] [place] [address] Director of [address] [place] [address]    Zahra Lopes    NOTE: This report was transcribed using voice recognition software. Every effort was made to ensure accuracy; however, inadvertent computerized transcription errors may be present.

## 2021-03-29 NOTE — PROGRESS NOTES
[facility] Attending    S: 80 y.o. female admitted  with shortness of breath. Was found to have a fib/flutter with RVR in low 100's, pleural effusions and/or infiltrate, and non-occlusive thrombus of left lower lobe, in addition to her other long-standing co-morbidities. She feels less short of breath since admission. Eating well and is oriented. Walks to bathroom with assistance. Says breathing is a little better today. Was up out of bed with assistance. Denies lightheadedness. Eating well but does not like the food here. Is looking forward to going back to the [facility]. Denies chest pain. O:   VS- Blood pressure (!) 119/93, pulse 96, temperature 96.8 °F (36 °C), temperature source Temporal, resp. rate 16, height 5' 1\" (1.549 m), weight 128 lb 11.2 oz (58.4 kg), SpO2 100 %, not currently breastfeeding. Exam is as noted by resident. Speaking in full sentences. Neck supple, no bruits or thyromegaly  Heart irregularly irregular, rate controlled at 85;  Loud pansystolic murmur  Lungs diminished breath sounds  Abd supple,  No tenderness or organomegaly  No leg edema or tenderness      Impressions: Active Problems:    Osteoporosis     History of compression fracture of spine    Acute respiratory failure with hypoxia and hypercapnia (HCC) improving    Thrombus- left lower lobe    Pleural effusion    Atrial flutter/fibrillation with rapid ventricular response (HCC)    Elevated brain natriuretic peptide (BNP) level    COPD (chronic obstructive pulmonary disease) (HCC)    Asthma     Metabolic alkalosis    Loud systolic murmur  Echocardiogram showing severe aortic stenosis and mitral calcification         Plan:    Diurese w/ lasix bid. Cont aldactone and imdur  Afib - eliquis, metoprolol  Pleural effusion - Pulm following. No thoracentesis . Outpatient follow up with pulm and repeat CT scan in 2-3 weeks.    Severe AS - CTS consulted, can f/u as outpt      Current Yuko Lawrence MD        potassium chloride 10 mEq/100 mL IVPB (Peripheral Line)  10 mEq Intravenous PRN Paula Rocha  mL/hr at 03/29/21 0707 10 mEq at 03/29/21 0707    magnesium sulfate 2000 mg in 50 mL IVPB premix  2,000 mg Intravenous PRN Paula Rocha MD        budesonide-formoterol (SYMBICORT) 80-4.5 MCG/ACT inhaler 2 puff  2 puff Inhalation BID Paula Rocha MD   2 puff at 03/29/21 0397          Attending Physician Statement  I have reviewed the chart, including any radiology or labs, and seen the patient with the resident(s). I personally reviewed and performed key elements of the history and exam.  I agree with the assessment, plan and orders as documented by the resident. Please refer to the resident note for additional information. Darrion Kunz.  Patrick

## 2021-03-29 NOTE — PROGRESS NOTES
Physical Therapy  Physical Therapy Initial Assessment     Name: Emily Delgado  : 1932  MRN: [id number]    Referring Provider:  Tanisha Caruso MD    Date of Service: 3/29/2021    Evaluating PT:  Hillary Rodriguez, PT, DPT. [id number]    Room #:  4506/4506-B  Diagnosis:  Acute respiratory failure   Reason for admission:  SOB, HANSON  Precautions:  Falls, O2  Procedures: none  Equipment Recommendations:  Return to use of rollator    SUBJECTIVE:  Pt lives at [facility]. Pt ambulated with rollator PTA - has assist for transfers and ambulation as needed. On home O2 3 L. OBJECTIVE:   Initial Evaluation  Date: 3/29 Treatment   Short Term/ Long Term   Goals   AM-PAC 6 Clicks 53/79     Was pt agreeable to Eval/treatment? Yes      Does pt have pain? Denies      Bed Mobility  Rolling: NT  Supine to sit: NT  Sit to supine: NT  Scooting: NT  -seated in chair  Independent    Transfers Sit to stand: SBA  Stand to sit: SBA  Stand pivot: NT  Independent    Ambulation    40 feet with [employer] SBA    >100 feet with [employer] Mod I    Stair negotiation: ascended and descended  NT  TBD   ROM BUE:  See OT eval   BLE:  WFL     Strength BUE:  See OT eval   BLE:  knee ext 5/5  Ankle DF 5/5  Increase by 1/3 MMT grade    Balance Sitting EOB:  NT  Dynamic Standing:  SBA [employer]  Sitting EOB:  indep  Dynamic Standing: Mod I [employer]     -Pt is A & O x 3  -Sensation:  unremarkable   -Edema:  unremarkable     Therapeutic Exercises:  functional activity     Patient education  Pt educated on safety, sequencing of transfers, and role of PT    Patient response to education:   Pt verbalized understanding Pt demonstrated skill Pt requires further education in this area   Yes  Yes  Yes      ASSESSMENT:    Comments:  Pt received sitting in chair and agreeable to PT session   Pt able to complete all transfers without hands on assist. Likely functioning at or near baseline which pt agrees with. On 3 L O2, maintaining 97-98% SPO2 with activity.  Ambulating with slow speed and flexed posture, again, baseline for pt. Cued for walker management and body positioning. Seated in chair to end session  Pt with all needs met and call light in reach. Pt would benefit from continued PT POC to address functional deficits described above. Treatment:  Patient practiced and was instructed in the following treatment:     Patient education provided continuously throughout session for sequencing, safety maintenance, and improving any deficits found during the evaluation.  STS and pivot transfer training - pt educated on proper hand and foot placement, safety and sequencing, and use of WW to safely complete sit<>stand and pivot transfers with hands on assistance to complete task safely     Gait training- pt was given verbal and tactile cues to facilitate improved walker usage and balance during ambulation as well as provided with physical assistance to complete task. Pt's/ family goals   1. Return home     Patient and or family understand(s) diagnosis, prognosis, and plan of care. Yes     PLAN:    Current Treatment Recommendations   [] Strengthening     [] ROM   [x] Balance Training   [x] Endurance Training   [x] Transfer Training   [x] Gait Training   [] Stair Training   [] Positioning   [x] Safety and Education Training   [] Patient/Caregiver Education   [] HEP  [] Other     Frequency of treatments: 2-5x/week x 1-2 weeks. Time in  0910  Time out  0930    Total Treatment Time 10 minutes     Evaluation Time includes thorough review of current medical information, gathering information on past medical history/social history and prior level of function, completion of standardized testing/informal observation of tasks, assessment of data and education on plan of care and goals.     CPT codes:  [x] Low Complexity PT evaluation 82125  [] Moderate Complexity PT evaluation 61315  [] High Complexity PT evaluation 24891  [] PT Re-evaluation 11904  [] Gait training 78835 - minutes  [] Manual therapy 28601 - minutes  [x] Therapeutic activities 41343 10 minutes  [] Therapeutic exercises 89616 - minutes  [] Neuromuscular reeducation 34821 - minutes     Chasidy Mcconnell, PT, DPT  [id number]

## 2021-03-29 NOTE — CARE COORDINATION
Spoke with Leonarda Almeida, they will send their facility [facility] to transport patient back at 2pm. Rn to give report. No other needs. I did advise facility to bring portable tank when then come for patient's transport. Discussed home PT with patient, she would prefer to use the company that is typically at [facility] she asked me to call them and see who it is. They typically use [facility]. Referral made and notified of discharge today. For questions I can be reached at [phone]. Bautista Vargas [place]    The Plan for Transition of Care is related to the following treatment goals: discharge planning when stable     The Patient and/or patient representative Crystal Yoon was provided with a choice of provider and agrees   with the discharge plan. [x] Yes [] No    Freedom of choice list was provided with basic dialogue that supports the patient's individualized plan of care/goals, treatment preferences and shares the quality data associated with the providers.  [x] Yes [] No

## 2021-03-29 NOTE — DISCHARGE INSTR - COC
Continuity of Care Form    Patient Name: Paco Paredes   :  1932  MRN:  [id number]    [facility] date:  3/23/2021  Discharge date:  2021    Code Status Order: Full Code   Advance Directives:   [facility] Documentation     Date/Time Healthcare Directive Type of Healthcare Directive Copy in [address] Agent's Name Healthcare Agent's Phone Number    21 1812  Yes, patient has an advance directive for healthcare treatment  Carol Annsheila Jenkins Michell          Admitting Physician:  Iván Colon MD  PCP: Iván Colon MD    Discharging Nurse: [facility] Unit/Room#: 4506/4506-B  Discharging Unit Phone Number: [phone]    Emergency Contact:   Extended Emergency Contact Information  Primary Emergency Contact: Adelfo Heard, [address] Phone: [phone]  Relation: Other  Secondary Emergency Contact: [address] Phone: [phone]  Relation: Other    Past Surgical History:  Past Surgical History:   Procedure Laterality Date    CATARACT REMOVAL WITH IMPLANT Right 13    CATARACT REMOVAL WITH IMPLANT Left 13    CHOLECYSTECTOMY      COLONOSCOPY      DILATION AND CURETTAGE OF UTERUS  2011    hysteroscopy    ENDOMETRIAL BIOPSY      FRACTURE SURGERY      [facility]    Rt.  Wrist    KYPHOSIS SURGERY      LOBECTOMY  rt upper    OTHER SURGICAL HISTORY Left 9-15-15    ORIF left wrist    VASCULAR SURGERY  2014    endovenous laser for leaking    VERTEBROPLASTY         Immunization History:   Immunization History   Administered Date(s) Administered    COVID-19, Walden Peter, PF, 30mcg/0.3mL 2021, 2021       Active Problems:  Patient Active Problem List   Diagnosis Code    Cataract, nuclear [id number]    Senile osteoporosis M81.0    Closed fracture of distal end of radius S52.509A    Left wrist pain M25.532    Post-traumatic osteoarthritis of both hips M16.4    DDD (degenerative disc disease), lumbar M51.36  History of compression fracture of spine Z87.81    Decreased bone density M85.80    Thrombus- left lower lobe I82.90    Pleural effusion J90    COPD (chronic obstructive pulmonary disease) (HCC) J44.9    Aortic stenosis I35.0    Persistent atrial fibrillation (HCC) I48.19       Isolation/Infection:   Isolation          No Isolation        Patient Infection Status     Infection Onset Added Last Indicated Last Indicated By Review Planned Expiration Resolved Resolved By    None active    Resolved    COVID-19 Rule Out 03/23/21 03/23/21 03/24/21 Respiratory Panel, Molecular, with COVID-19 (Restricted: peds pts or suitable admitted adults) (Ordered)   03/24/21 Rule-Out Test Resulted    COVID-19 Rule Out 03/23/21 03/23/21 03/23/21 COVID-19, Rapid (Ordered)   03/23/21 Rule-Out Test Resulted          Nurse Assessment:  Last Vital Signs: BP 99/69   Pulse 85   Temp 98 °F (36.7 °C) (Temporal)   Resp 18   Ht 5' 1\" (1.549 m)   Wt 128 lb 11.2 oz (58.4 kg)   SpO2 97%   BMI 24.32 kg/m²     Last documented pain score (0-10 scale): Pain Level: 0  Last Weight:   Wt Readings from Last 1 Encounters:   03/29/21 128 lb 11.2 oz (58.4 kg)     Mental Status:  oriented and alert    IV Access:  - None    Nursing Mobility/ADLs:  Walking   Assisted  Transfer  Assisted  Bathing  Assisted  Dressing  Assisted  Toileting  Assisted  Feeding  Independent  Med Admin  Independent  Med Delivery   whole    Wound Care Documentation and Therapy:  Incision 09/15/15 Wrist Left (Active)   Number of days: 2021        Elimination:  Continence:   · Bowel: Yes  · Bladder: Yes  Urinary Catheter: None   Colostomy/Ileostomy/Ileal Conduit: No       Date of Last BM: 3/26/2021    Intake/Output Summary (Last 24 hours) at 3/29/2021 0556  Last data filed at 3/29/2021 0548  Gross per 24 hour   Intake 780 ml   Output 2325 ml   Net -1545 ml     I/O last 3 completed shifts: In: 600 [P.O.:600]  Out: 1975 [GZQPS:6185]    Safety Concerns:      At Risk for Falls Impairments/Disabilities:      None and Hearing    Nutrition Therapy:  Current Nutrition Therapy:   - Oral Diet:  Carb Control 5 carbs/meal (2000kcals/day) and Low Sodium (2gm)    Routes of Feeding: Oral  Liquids: No Restrictions  Daily Fluid Restriction: no  Last Modified Barium Swallow with Video (Video Swallowing Test): not done    Treatments at the Time of Hospital Discharge:   Respiratory Treatments: symbicort, albuterol prn  Oxygen Therapy:  is on oxygen at 4 L/min per nasal cannula. Ventilator:    - No ventilator support    Rehab Therapies: Physical Therapy and Occupational Therapy  Weight Bearing Status/Restrictions: No weight bearing restirctions  Other Medical Equipment (for information only, NOT a DME order):  walker  Other Treatments: ***    Patient's personal belongings (please select all that are sent with patient):  {[facility] DME Belongings:[id number]}    RN SIGNATURE:  Electronically signed by Anamika Mccullough RN on 3/29/21 at 10:05 AM EDT    CASE MANAGEMENT/SOCIAL WORK SECTION    Inpatient Status Date: ***    Readmission Risk Assessment Score:  Readmission Risk              Risk of Unplanned Readmission:        15           Discharging to Facility/ Agency   · Name:   · Address:  · Phone:  · Fax:    Dialysis Facility (if applicable)   · Name:  · Address:  · Dialysis Schedule:  · Phone:  · Fax:    / signature: {Esignature:[id number]}    PHYSICIAN SECTION    Prognosis: {Prognosis:8574318934}    Condition at Discharge: Asa Tapia Patient Condition:[id number]}    Rehab Potential (if transferring to Rehab): {Prognosis:9472371450}    Recommended Labs or Other Treatments After Discharge: ***    Physician Certification: I certify the above information and transfer of Susan Herrera  is necessary for the continuing treatment of the diagnosis listed and that she requires {Admit to Appropriate Level of Care:11126} for {GREATER/LESS:944145466} 30 days.      Update Admission H&P: {[facility] DME Changes

## 2021-03-29 NOTE — DISCHARGE SUMMARY
Discharge Summary    Lisseth Roldan  :  1932  MRN:  [id number]    Admit date:  3/23/2021  Discharge date:      Admitting Physician:  Manjit Brothers MD      Admission Condition:  good    Discharged Condition:  good    Discharge Diagnoses:   Patient Active Problem List   Diagnosis    Cataract, nuclear    Senile osteoporosis    Closed fracture of distal end of radius    Left wrist pain    Post-traumatic osteoarthritis of both hips    DDD (degenerative disc disease), lumbar    History of compression fracture of spine    Decreased bone density    Thrombus- left lower lobe    Pleural effusion    COPD (chronic obstructive pulmonary disease) ([facility] [address]    Aortic stenosis    Persistent atrial fibrillation [facility]       Hospital Course: Lisseth Roldan is a 80 y.o. female with PMH of COPD, HTN, frequent falls who presented to the ED for shortness of breath. Patient is supposed to be on 3 L of home oxygen however upon arrival to the ED she was not on oxygen and her O2 sat was 79%. Patient was afebrile and tachycardic    EKG done in the ED showed that she was in A. fib with RVR. Chest x-ray showed bilateral pleural effusions, and a CTA of the chest revealed a focal, nonobstructive thrombus in one of her pulmonary vessels. Cardiology, pulmonology, cardiothoracic surgery were consulted. Cardiology recommended starting patient on anticoagulation for her atrial fibrillation. They recommended Eliquis. The patient did have an echocardiogram scheduled as an outpatient prior to her admission, so this was done as an inpatient. Echocardiogram revealed severe aortic stenosis, moderate mitral stenosis, moderate tricuspid regurgitation, pulmonary hypertension with RVSP 55 mmHg. At this point cardiothoracic surgery was consulted for possible valve replacement. They recommended that the patient have TAVR work-up as she is a poor candidate for SAVR.   Pulmonology recommended that patient be placed on BiPAP while in the hospital and CPAP as outpatient. She declined BiPAP, but she does use CPAP at home. During her stay her rate was better controlled with metoprolol. Her breathing improved, and she was saturating well on 3 L of oxygen via nasal cannula. Patient does get short of breath when she exerts herself, likely due to her valvular disease, but she is stable at this time from a medical standpoint. Patient also had physical therapy assessment while hospitalized, her AMPA scores were 17/24. The patient's course was otherwise uneventful. She progressed well, pain was controlled on PO medications. Physical therapy evaluated and treated the patient and recommended outpatient physical therapy 2-5 times a week for 1 to 2 weeks. She was tolerating a regular diet with no nausea or vomiting, and was in a suitable condition for discharge to [facility]    Discharge Medications:         Medication List      START taking these medications    apixaban 2.5 MG Tabs tablet  Commonly known as: ELIQUIS  Take 1 tablet by mouth 2 times daily        CHANGE how you take these medications    Chuy 5 MG Tbec  Generic drug: predniSONE  One pill daily at bedtime with food  What changed: Another medication with the same name was removed. Continue taking this medication, and follow the directions you see here.         CONTINUE taking these medications    acetaminophen 650 MG extended release tablet  Commonly known as: TYLENOL  Two tablets every 8 hours     * albuterol sulfate  (90 Base) MCG/ACT inhaler  Commonly known as: Ventolin HFA  Inhale 2 puffs into the lungs 4 times daily as needed for Wheezing     * albuterol (2.5 MG/3ML) 0.083% nebulizer solution  Commonly known as: PROVENTIL  Take 3 mLs by nebulization every 6 hours as needed for Wheezing     Benefiber Powd  One-half teaspoon with breakfast daily     Biofreeze 4 % Gel  Generic drug: Menthol (Topical Analgesic)     Breo Ellipta 100-25 MCG/INH Aepb inhaler  Generic drug: fluticasone-vilanterol     calcium carbonate 600 MG Tabs tablet  Commonly known as: Calcium 600  Take 1 tablet by mouth 2 times daily     FLAXSEED OIL PO     furosemide 40 MG tablet  Commonly known as: LASIX  Take 1 tablet by mouth 2 times daily Take 2 tablets daily unless weight is below 138lbs then take 1 tablet daily     ipratropium 0.03 % nasal spray  Commonly known as: ATROVENT     isosorbide mononitrate 30 MG extended release tablet  Commonly known as: IMDUR  Take 1 tablet by mouth daily     leflunomide 20 MG tablet  Commonly known as: ARAVA  Take 1 tablet by mouth daily     loratadine 10 MG tablet  Commonly known as: Claritin  One daily at bedtime     melatonin 1 MG tablet  Take 1 tablet by mouth nightly as needed for Sleep     metoprolol tartrate 25 MG tablet  Commonly known as: LOPRESSOR  Take 1 tablet by mouth 2 times daily     spironolactone 25 MG tablet  Commonly known as: ALDACTONE     * therapeutic multivitamin-minerals tablet     * Ocuvite Adult 50+ Caps  Take 1 capsule by mouth daily     vitamin D 50 MCG (2000 UT) Tabs tablet  Commonly known as: CHOLECALCIFEROL  Take 1 tablet by mouth daily     zinc oxide 40 % ointment  Commonly known as: Boudreauxs Butt Paste  Apply topically as needed. zoledronic acid 5 MG/100ML Soln  Commonly known as: RECLAST         * This list has 4 medication(s) that are the same as other medications prescribed for you. Read the directions carefully, and ask your doctor or other care provider to review them with you.             STOP taking these medications    hydroxychloroquine 200 MG tablet  Commonly known as: PLAQUENIL           Where to Get Your Medications      These medications were sent to [facility], [address], [place] [address]    Phone: [phone]   · apixaban 2.5 MG Tabs tablet         Consults:  cardiology, pulmonary/intensive care and cardiothoracic surgery    Significant Diagnostic Studies:      Labs:  Na/K/Cl/CO2:  139/3.3/93/40 (03/29 0431)  BUN/Cr/glu/ALT/AST/amyl/lip:  29/0.8/--/18/18/--/-- (03/29 0431)  WBC/Hgb/Hct/Plts:  6.1/12.0/39.1/169 (03/29 0431)  [unfilled]  estimated creatinine clearance is 40 mL/min (based on SCr of 0.8 mg/dL). New Imaging:  Echo Complete    Result Date: 3/25/2021  Transthoracic Echocardiography Report (TTE)  Demographics   Patient Name   [facility]   Gender           Female                 [address] [facility] [id number]        Room Number      [address] [facility]  Number   Account #      [de-identified]       Procedure Date   03/25/2021   Corporate ID                   Ordering         Lorna Adamson                                 Physician   Accession      [id number]      Referring  Number                         Physician   Date of Birth  12/04/1932      Sonographer      Tania Mata CLAYTON   Age            80 year(s)      Interpreting     [address]                                 Physician        Physician Cardiology                                                  Justin Rose DO                                  Any Other  Procedure Type of Study   TTE procedure:Echo Complete W/Doppler & Color Flow. Procedure Date Date: 03/25/2021 Start: 02:40 PM Study Location: Portable Technical Quality: Limited visualization due to poor acoustical window. Indications:Heart murmur. Patient Status: Routine Height: 61 inches Weight: 136 pounds BSA: 1.6 m^2 BMI: 25.7 kg/m^2 Rhythm: Sinus arrhythmia HR: 77 bpm BP: 117/63 mmHg  Findings   Left Ventricle  Ejection fraction is visually estimated at 70%. No regional wall motion  abnormalities seen. Mild left ventricular concentric hypertrophy noted. Left ventricle size is normal. Indeterminate diastolic function. Right Ventricle  Normal right ventricle size with reduced function. TAPSE is 1.4 cm. Left Atrium  Left atrial volume index of 58 ml per meters squared BSA.    Right Atrium  Mildly enlarged right atrium size. Mitral Valve  Severe mitral annular calcification. Moderate mitral regurgitation is  present. Mean transmitral gradient 7.8 mmHg. Mild mitral regurgitation is  present. Tricuspid Valve  The tricuspid valve appears structurally normal.  Moderate tricuspid regurgitation. Pulmonary hypertension is moderate. RVSP is 55 mmHg. Aortic Valve  Individual aortic valve leaflets are not clearly visualized. Severe aortic  stenosis is present. The peak velocity is 4.8 m/s. The aortic valve area  is 0.5 cm2 with a maximum gradient of 92 mmHg and a mean gradient of 67  mmHg. No evidence of aortic valve regurgitation. Pulmonic Valve  The pulmonic valve was not well visualized. Pericardial Effusion  No evidence for hemodynamically significant pericardial effusion. Aorta  Normal aortic root and ascending aorta. Miscellaneous  Normal Inferior Vena Cava diameter and respiratory variation. Conclusions   Summary  Ejection fraction is visually estimated at 70%. No regional wall motion abnormalities seen. Mild left ventricular concentric hypertrophy noted. Normal right ventricle size with reduced function. TAPSE is 1.4 cm. Left atrial volume index of 58 ml per meters squared BSA. Individual aortic valve leaflets are not clearly visualized. Severe aortic stenosis is present. The peak velocity is 4.8 m/s. The  aortic valve area is 0.5 cm2 with a maximum gradient of 92 mmHg and a mean  gradient of 67 mmHg. Severe mitral annular calcification. Moderate mitral stenosis is present. Mean transmitral gradient 7.8 mmHg. Mild mitral regurgitation is present. Moderate tricuspid regurgitation. Pulmonary hypertension is moderate. RVSP is 55 mmHg.    Signature   ----------------------------------------------------------------  Electronically signed by Malik AKERSInterpreting  physician) on 03/25/2021 03:35 PM  ----------------------------------------------------------------  M-Mode/2D Measurements & Calculations   LV Diastolic     LV Systolic Dimension: 1.9 cm   LA Dimension: 3.9 cmAO  Dimension: 4.3   LV Volume Diastolic: 10.7 ml    Root Dimension: 3.4 cm  cm               LV Volume Systolic: 27.9 ml  LV EW:02.4 %     LV EDV/LV EDV Index: 82.3 GM/72  LV PW Diastolic: WZ/N^2CN ESV/LV ESV Index: 11.4  1.3 cm           ml/7ml/ m^2                     RV Diastolic Dimension:  Septum           EF Calculated: 86.2 %           3.1 cm  Diastolic: 1.4   LV Mass Index: 137 l/min*m^2  cm  CO: 3.72 l/min                                   LA volume/Index: 92.9 ml  CI: 2.32 l/m*m^2 LVOT: 2 cm                      /58.03ml/m^2  LV Mass: 219.83                                  RA Area: 21 cm^2  g  Doppler Measurements & Calculations   MV Peak E-Wave:     AV Peak Velocity: 4.79 LVOT Peak Velocity: 0.9 m/s  2.12 m/s            m/s                    LVOT Mean Velocity: 0.65 m/s                      AV Peak Gradient:      LVOT Peak Gradient: 3.2  MV Peak Gradient:   91.78 mmHg             mmHgLVOT Mean Gradient: 1.8  18 mmHg             AV Mean Velocity: 4.01 mmHg  MV Mean Gradient:   m/s                    Estimated RVSP: 71.8 mmHg  7.8 mmHg            AV Mean Gradient: 66.6 Estimated RAP:3 mmHg  MV Mean Velocity:   mmHg  1.32 m/s            AV VTI: 94.5 cm                      AV Area                TR Velocity:4.15 m/s  MV Area             (Continuity):0.51 cm^2 TR Gradient:68.82 mmHg  (continuity): 1.2                          PV Peak Velocity: 1.56 m/s  cm^2                LVOT VTI: 15.4 cm      PV Peak Gradient: 9.71 mmHg                      IVRT: 23.1 msec        PV Mean Velocity: 1.22 m/s                      Estimated PASP: 71.82  PV Mean Gradient: 6.2 mmHg                      mmHg  [email or web address] [id number]%3d    Xr Chest Portable    Result Date: 3/23/2021  EXAMINATION: ONE XRAY VIEW OF THE CHEST calcified nodules involving both upper lobes. Bilateral apical pleural thickening with calcified pleural plaques. Upper Abdomen: Large hiatal hernia. Soft Tissues/Bones: No acute bone or soft tissue abnormality. Deformity of the right lower chest wall. Extensive degenerative changes throughout the thoracolumbar spine with multiple vertebroplasties. Focal nonocclusive thrombus involving a 1st order branch vessel to the left lower lobe. Moderate bilateral pleural effusions left greater than right with adjacent atelectasis. Large hiatal hernia. Cardiomegaly. Findings discussed on 2021 at 5:28 p.m. with Dr. Delia Wells. Us Lower Extremity Bilateral Vein Mapping W Dvt    Result Date: 3/23/2021  Patient MRN:  [id number] : 1932 Age: 80 years Gender: Female Order Date:  3/23/2021 9:22 PM EXAM: US LOWER EXTREMITY BILATERAL VEIN MAPPING W DVT NUMBER OF IMAGES:  61 INDICATION:  Leg swelling, thrombus in lungs Leg swelling, thrombus in lungs What reading provider will be dictating this exam?->[facility] COMPARISON: None Within the visualized vessels, there is no evidence for deep venous thrombosis There is good compressibility, there is good augmentation, there is good color flow. There is a hypoechoic region in right inner thigh consistent with a hematoma    Within the visualized vessels there is no evidence for deep venous thrombosis         Treatments:   cardiac meds: metoprolol, anticoagulation: Apixaban and respiratory therapy: O2    Discharge Exam:      Disposition:   [facility]    Things to follow at upcoming office visit:   [facility]    Future Appointments   Date Time Provider Shantell Shields   2021  9:00 AM Naz Haro DO [facility]       More than 30 minutes was spent in preparation of this patient's discharge including, but not limited to, examination, preparation of documents, prescription preparation, counseling and coordination.     Signed:  Althea Rockwell DO  3/29/2021, 1:01 PM

## 2021-03-29 NOTE — PLAN OF CARE
Problem: Skin Integrity:  Goal: Will show no infection signs and symptoms  Description: Will show no infection signs and symptoms  Outcome: Met This Shift  Goal: Absence of new skin breakdown  Description: Absence of new skin breakdown  Outcome: Met This Shift     Problem: Falls - Risk of:  Goal: Will remain free from falls  Description: Will remain free from falls  Outcome: Met This Shift  Goal: Absence of physical injury  Description: Absence of physical injury  Outcome: Met This Shift     Problem:  Activity:  Goal: Fatigue will decrease  Description: Fatigue will decrease  Outcome: Met This Shift     Problem: Cardiac:  Goal: Hemodynamic stability will improve  Description: Hemodynamic stability will improve  Outcome: Met This Shift     Problem: Respiratory:  Goal: Ability to maintain a clear airway will improve  Description: Ability to maintain a clear airway will improve  Outcome: Met This Shift

## 2021-04-07 NOTE — PROGRESS NOTES
[facility]        Patient name: Paulette De La Paz    Reason for consult: aortic stenosis    Referring Physician: Dr. 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 She reports progressive dyspnea over the past few months as well as presyncope and lower extremity edema which is managed with twice daily furosemide. She denies chest pain and syncope and palpitations. She is compliant with her medications. A focused history review includes:  1. Chronic diastolic heart failure-recently started on oxygen at home. Admitted for acute heart failure in February 2021.  2. Atrial fibrillation on apixaban  3. Segmental left lower lobe PE, nonobstructive, March 2021  4. Pleural effusions, moderate and bilateral in March 2021  5. Hypertension  6. CKD stage II with baseline creatinine 0.8  7. Remote history of DVT  8. COPD and asthma  9. CHRISTOPHER on BiPAP\  10. Chronic immunosuppression with prednisone  11. Severe kyphosis    Allergies:    Allergies   Allergen Reactions    Cardizem [Diltiazem Hcl] Rash    Nsaids Other (See Comments)     Kidney insufficiency    Tape [Adhesive Tape] Rash       Home medications:    Current Outpatient Medications   Medication Sig Dispense Refill    isosorbide mononitrate (IMDUR) 30 MG extended release tablet TAKE 1 TABLET BY MOUTH DAILY 30 tablet 11    spironolactone (ALDACTONE) 25 MG tablet Take 1 tablet by mouth daily 30 tablet 3    apixaban (ELIQUIS) 2.5 MG TABS tablet Take 1 tablet by mouth 2 times daily 60 tablet 1    furosemide (LASIX) 40 MG tablet Take 1 tablet by mouth 2 times daily Take 2 tablets daily unless weight is below 138lbs then take 1 tablet daily 60 tablet 5    fluticasone-vilanterol (BREO ELLIPTA) 100-25 MCG/INH AEPB inhaler Inhale into the lungs daily      ipratropium (ATROVENT) 0.03 % nasal spray by Each Nostril route as needed for Rhinitis Use as directed as needed      Menthol, Topical Analgesic, (BIOFREEZE) 4 % GEL Apply topically Apply topically to affected area as directed.  leflunomide (ARAVA) 20 MG tablet Take 1 tablet by mouth daily 30 tablet 2    metoprolol tartrate (LOPRESSOR) 25 MG tablet Take 1 tablet by mouth 2 times daily 60 tablet 11    albuterol (PROVENTIL) (2.5 MG/3ML) 0.083% nebulizer solution Take 3 mLs by nebulization every 6 hours as needed for Wheezing 120 each 3    Multiple Vitamins-Minerals (OCUVITE ADULT 50+) CAPS Take 1 capsule by mouth daily 30 capsule 11    albuterol sulfate HFA (VENTOLIN HFA) 108 (90 Base) MCG/ACT inhaler Inhale 2 puffs into the lungs 4 times daily as needed for Wheezing 1 Inhaler 5    predniSONE (DANNA) 5 MG TBEC One pill daily at bedtime with food 30 tablet 5    melatonin 1 MG tablet Take 1 tablet by mouth nightly as needed for Sleep 30 tablet 11    loratadine (CLARITIN) 10 MG tablet One daily at bedtime 30 tablet 11    calcium carbonate (CALCIUM 600) 600 MG TABS tablet Take 1 tablet by mouth 2 times daily 30 tablet 11    vitamin D (CHOLECALCIFEROL) 50 MCG (2000 UT) TABS tablet Take 1 tablet by mouth daily 30 tablet 11    Wheat Dextrin (BENEFIBER) POWD One-half teaspoon with breakfast daily 1 Can 11    acetaminophen (TYLENOL) 650 MG extended release tablet Two tablets every 8 hours 180 tablet 5    zinc oxide (BOUDREAUXS BUTT PASTE) 40 % ointment Apply topically as needed. 1 Tube 2    Flaxseed, Linseed, (FLAXSEED OIL PO) Take 1,000 mg by mouth      zoledronic acid (RECLAST) 5 MG/100ML SOLN Infuse 5 mg intravenously once Yearly      therapeutic multivitamin-minerals (THERAGRAN-M) tablet Take 1 tablet by mouth daily. No current facility-administered medications for this visit.         Past Medical History:  Past Medical History:   Diagnosis Date    Arthritis     Asthma     Calf DVT (deep venous thrombosis) (HCC) rt leg    1998    COPD (chronic obstructive pulmonary disease) (HCC)     Hemorrhoids, internal     Hx of blood clots     Hypertension     Polymyalgia ([address]     PONV (postoperative nausea and vomiting)     Renal insufficiency     Rosacea keratitis     Shingles     Sleep apnea 05/01/2014    bi-pap @ night    UTI (lower urinary tract infection) 06/12/2013    Volume overload        Past Surgical History:  Past Surgical History:   Procedure Laterality Date    CATARACT REMOVAL WITH IMPLANT Right 7 8 13    CATARACT REMOVAL WITH IMPLANT Left 09/09/13    CHOLECYSTECTOMY      COLONOSCOPY      DILATION AND CURETTAGE OF UTERUS  2011    hysteroscopy    ENDOMETRIAL BIOPSY      FRACTURE SURGERY      FRACTURE SURGERY  1995    Rt.  Wrist    KYPHOSIS SURGERY      LOBECTOMY  rt upper    OTHER SURGICAL HISTORY Left 9-15-15    ORIF left wrist    VASCULAR SURGERY  2014    endovenous laser for leaking    VERTEBROPLASTY         Social History:  Social History     Socioeconomic History    Marital status: Single     Spouse name: Not on file    Number of children: Not on file    Years of education: Not on file    Highest education level: Not on file   Occupational History    Not on file   Social Needs    Financial resource strain: Not on file    Food insecurity     Worry: Not on file     Inability: Not on file    Transportation needs     Medical: Not on file     Non-medical: Not on file   Tobacco Use    Smoking status: Never Smoker    Smokeless tobacco: Never Used   Substance and Sexual Activity    Alcohol use: No    Drug use: No    Sexual activity: Not on file   Lifestyle    Physical activity     Days per week: Not on file     Minutes per session: Not on file    Stress: Not on file   Relationships    Social connections     Talks on phone: Not on file     Gets together: Not on file     Attends Anabaptism service: Not on file     Active member of club or organization: Not on file angiography only for critical CAD. ·   Seen and discussed in association with Dr. Betsey Aldana MD of cardiothoracic surgery.     Robert Hanson MD, [facility]  Interventional Cardiology/Structural Heart Disease    Electronically signed by Robert Hanson MD on 4/8/2021 at 12:40 PM

## 2021-04-08 PROBLEM — I34.9 NON-RHEUMATIC MITRAL VALVE DISEASE: Status: ACTIVE | Noted: 2021-01-01

## 2021-04-08 PROBLEM — I27.20 MODERATE TO SEVERE PULMONARY HYPERTENSION (HCC): Status: ACTIVE | Noted: 2021-01-01

## 2021-04-08 NOTE — PATIENT INSTRUCTIONS
[facility] will be in touch to schedule your CT scans    Please see your dentist for clearance prior to heart valve replacement - they can fax a clearance note to [phone]    Call [facility] with any questions [phone]

## 2021-04-12 NOTE — TELEPHONE ENCOUNTER
Last Appointment:  Visit date not found  Future Appointments   Date Time Provider Shantell Shields   4/26/2021  9:45 AM Martin Browning, [address] [place] [address]

## 2021-05-04 NOTE — PROGRESS NOTES
CHIEF COMPLAINT: CHF/Murmur/SOB/COPD/HTN/PVD    HISTORY OF PRESENT ILLNESS: Patient is a 80 y.o. female seen at the request of Nereyda Lea MD.      Patient presenting in follow up. Recent issues with volume overload for which she was put on diuretics. She has lost 20 pounds. On oxygen. Some SOB.      Past Medical History:   Diagnosis Date    Arthritis     Asthma     Calf DVT (deep venous thrombosis) ([facility]) rt leg    1998    COPD (chronic obstructive pulmonary disease) ([facility])     Hemorrhoids, internal     Hx of blood clots     Hypertension     Polymyalgia ([facility])     PONV (postoperative nausea and vomiting)     Renal insufficiency     Rosacea keratitis     Shingles     Sleep apnea 05/01/2014    bi-pap @ night    UTI (lower urinary tract infection) 06/12/2013    Volume overload        Patient Active Problem List   Diagnosis    Cataract, nuclear    Senile osteoporosis    Closed fracture of distal end of radius    Left wrist pain    Post-traumatic osteoarthritis of both hips    DDD (degenerative disc disease), lumbar    History of compression fracture of spine    Decreased bone density    Thrombus- left lower lobe    Pleural effusion    COPD (chronic obstructive pulmonary disease) ([facility])    Critical aortic valve stenosis    Persistent atrial fibrillation ([facility])    Non-rheumatic mitral valve disease    Moderate to severe pulmonary hypertension ([facility])       Allergies   Allergen Reactions    Cardizem [Diltiazem Hcl] Rash    Nsaids Other (See Comments)     Kidney insufficiency    Tape [Adhesive Tape] Rash       Current Outpatient Medications   Medication Sig Dispense Refill    acetaminophen (MAPAP ARTHRITIS PAIN) 650 MG extended release tablet TAKE 2 TABLETS BY MOUTH EVERY EIGHT HOURS. 180 tablet 0    isosorbide mononitrate (IMDUR) 30 MG extended release tablet TAKE 1 TABLET BY MOUTH DAILY 30 tablet 11    spironolactone (ALDACTONE) 25 MG tablet Take 1 tablet by mouth daily 30 tablet 3    furosemide (LASIX) 40 MG tablet Take 1 tablet by mouth 2 times daily Take 2 tablets daily unless weight is below 138lbs then take 1 tablet daily 60 tablet 5    fluticasone-vilanterol (BREO ELLIPTA) 100-25 MCG/INH AEPB inhaler Inhale into the lungs daily      leflunomide (ARAVA) 20 MG tablet Take 1 tablet by mouth daily 30 tablet 2    metoprolol tartrate (LOPRESSOR) 25 MG tablet Take 1 tablet by mouth 2 times daily 60 tablet 11    Multiple Vitamins-Minerals (OCUVITE ADULT 50+) CAPS Take 1 capsule by mouth daily 30 capsule 11    predniSONE (DANNA) 5 MG TBEC One pill daily at bedtime with food 30 tablet 5    loratadine (CLARITIN) 10 MG tablet One daily at bedtime 30 tablet 11    calcium carbonate (CALCIUM 600) 600 MG TABS tablet Take 1 tablet by mouth 2 times daily 30 tablet 11    vitamin D (CHOLECALCIFEROL) 50 MCG (2000 UT) TABS tablet Take 1 tablet by mouth daily 30 tablet 11    Wheat Dextrin (BENEFIBER) POWD One-half teaspoon with breakfast daily 1 Can 11    Flaxseed, Linseed, (FLAXSEED OIL PO) Take 1,000 mg by mouth      zoledronic acid (RECLAST) 5 MG/100ML SOLN Infuse 5 mg intravenously once Yearly      therapeutic multivitamin-minerals (THERAGRAN-M) tablet Take 1 tablet by mouth daily. No current facility-administered medications for this visit.         Social History     Socioeconomic History    Marital status: Single     Spouse name: Not on file    Number of children: Not on file    Years of education: Not on file    Highest education level: Not on file   Occupational History    Not on file   Social Needs    Financial resource strain: Not on file    Food insecurity     Worry: Not on file     Inability: Not on file    Transportation needs     Medical: Not on file     Non-medical: Not on file   Tobacco Use    Smoking status: Never Smoker    Smokeless tobacco: Never Used   Substance and Sexual Activity    Alcohol use: No    Drug use: No    Sexual activity: Not on file distress. No wheezes. No rales. No tenderness. Abdominal: Soft. Bowel sounds are normal. No distension and no mass. No tenderness. No rebound and no guarding. Musculoskeletal: Normal range of motion. No edema and no tenderness. Lymphadenopathy:   No cervical adenopathy. No groin adenopathy. Neurological: Alert and oriented to person, place, and time. Skin: Skin is warm and dry. No rash noted. Not diaphoretic. No erythema. Psychiatric: Normal mood and affect. Behavior is normal.     EKG personally reviewed 05/05/21:  Afib. Echo Summary 3/25/2021:   Ejection fraction is visually estimated at 70%.   No regional wall motion abnormalities seen.   Mild left ventricular concentric hypertrophy noted.   Normal right ventricle size with reduced function. TAPSE is 1.4 cm.   Left atrial volume index of 58 ml per meters squared BSA.   Individual aortic valve leaflets are not clearly visualized.   Severe aortic stenosis is present. The peak velocity is 4.8 m/s. The aortic valve area is 0.5 cm2 with a maximum gradient of 92 mmHg and a mean gradient of 67 mmHg.   Severe mitral annular calcification.   Moderate mitral stenosis is present. Mean transmitral gradient 7.8 mmHg.   Mild mitral regurgitation is present.   Moderate tricuspid regurgitation.   Pulmonary hypertension is moderate. RVSP is 55 mmHg. ASSESSMENT AND PLAN:  Patient Active Problem List   Diagnosis    Cataract, nuclear    Senile osteoporosis    Closed fracture of distal end of radius    Left wrist pain    Post-traumatic osteoarthritis of both hips    DDD (degenerative disc disease), lumbar    History of compression fracture of spine    Decreased bone density    Thrombus- left lower lobe    Pleural effusion    COPD (chronic obstructive pulmonary disease) (HCC)    Critical aortic valve stenosis    Persistent atrial fibrillation ([facility])    Non-rheumatic mitral valve disease    Moderate to severe pulmonary hypertension ([address]     1.  VHD:

## 2021-05-06 NOTE — TELEPHONE ENCOUNTER
Make sure she gets her lasix today. Talia Coombs D.O.   Cardiologist  Cardiology, [address] [facility]

## 2021-05-06 NOTE — TELEPHONE ENCOUNTER
According to nurse patient had CT scan yesterday for TAVR, today she feels extremely clammy, her bp is 102/58 pulse fluctuating , respiration is 18 and labored and pulse Ox is 94, nurse is asking if there is anything that can be done for patient to be more comfortable, please advise

## 2021-05-11 NOTE — TELEPHONE ENCOUNTER
Nurse at [facility] states that patient has been very SOB and very tired, her bp is 100/58, pulse 114 (remains above 100) her respiration is 18 shallow and pulse ox is 90%,please advise

## 2021-05-11 NOTE — TELEPHONE ENCOUNTER
Start digoxin 0.125 mg daily. Change lasix to twice a day. BMP Thursday. If not feeling better by Thursday then OV Friday. Kevin Joel D.O.   Cardiologist  Cardiology, [address] [place],

## 2021-05-12 NOTE — PROGRESS NOTES
Jeanne 36 PRE-ADMISSION TESTING GENERAL INSTRUCTIONS- [facility]-phone number:[phone]    GENERAL INSTRUCTIONS  [x] Nothing by mouth after midnight, including gum, candy, mints, or water. [x] You may brush your teeth, gargle, but do NOT swallow water. [x]Hibiclens shower  the night before and the morning of surgery. Do not use             Hibiclens on your face or head. [x]No smoking, chewing tobacco, illegal drugs, or alcohol within 24 hours of your surgery. [x] Jewelry, valuables or body piercing's should not be brought to the hospital. All body and/or tongue piercing's must be removed prior to arriving to hospital.  ALL hair pins must be removed. [x] Do not wear makeup, lotions, powders, deodorant. Nail polish as directed by the nurse. [x] Bring insurance card and photo ID. [x] Transfusion Bracelet: Please bring with you to hospital, day of surgery. PARKING INSTRUCTIONS:   [x] Arrival Time:__10:30am    may  19  · [x] Parking lot '\"I\"  is located on [facility] (the corner of [facility] and [facility]). To enter, press the button and the gate will lift. A free token will be provided to exit the lot. One car per patient is allowed to park in this lot. All other cars are to park on [address] [place] [address] either in the parking garage or the handicap lot. [] To reach the [place] lobby from [address] [place] [address], upon entering the hospital, take elevator B to the 3rd floor. EDUCATION INSTRUCTIONS:    .     [x] Pre-admission Testing educational folder given  [x] Incentive Spirometry,coughing & deep breathing exercises reviewed. [x]Pain: Post-op pain is normal and to be expected. You will be asked to rate your pain from 0-10(a zero is not acceptable-education is needed). Your post-op pain goal is:  [x] Ask your nurse for your pain medication.     MEDICATION INSTRUCTIONS:   [x]Bring a complete list of your medications, please write the last time you took the medicine, give

## 2021-05-13 NOTE — TELEPHONE ENCOUNTER
Patient started digoxin and lasix was increased on 5/11, nurse called today with an update:  bp 108/68   P   80  Ox 92  R   18  Nurse states patient is feeling better, labs done today in chart for review

## 2021-05-19 PROBLEM — I35.0 SEVERE AORTIC STENOSIS: Status: ACTIVE | Noted: 2021-01-01

## 2021-05-19 NOTE — LETTER
[place] Department Medicaid  CERTIFICATION OF NECESSITY  FOR NON-EMERGENCY TRANSPORTATION   BY GROUND AMBULANCE      Individual Information   1. Name: Darryl Pruett 2. [place] Medicaid Billing Number:    3. Address: [place] [address]      Transportation Provider Information   4. Provider Name: [facility]   5. [place] Medicaid Provider Number:  National Provider Identifier (NPI):      Certification  7. Criteria:  During transport, this individual requires:  [x] Medical treatment or continuous     supervision by an EMT. [x] The administration or regulation of oxygen by another person. [] Supervised protective restraint. 8. Period Beginning Date: 21   9. Length  [x] Not more than 1 day(s)  [] One Year     Additional Information Relevant to Certification   10. Comments or Explanations, If Necessary or Appropriate     S/P TAVR, Severe Kyphosis, COPD     Certifying Practitioner Information   11. Name of Practitioner: Dr Ochoa Ulloa DO   12. [place] Medicaid Provider Number, If Applicable:  [address] Provider Identifier (NPI):      Signature Information   14. Date of Signature: 21 15. Name [facility] Signing[facility]   95. Signature and Professional Designation: Malik Pineda Discharge Planner     [facility] [address]  Rev. 2015    [address] [place] [address] Encounter Date/Time: 2021 Cyrusarnielisa Account: [de-identified]    MRN: [id number]    Patient: Isabel Pillai Serial #: [id number]      ENCOUNTER          Patient Class: I Private Enc?   No Unit RM BD: [address] [place] [address] Service: [address]   Encounter DX: Pre-op testing [M26.550]   ADM Provider: Liza Dillon MD   Procedure: AL REPLACE AORTIC VALVE *   ATT Provider: Ramón Marroquin DO   REF Provider:        Admission DX: Pre-op testing, Severe aortic stenosis and codes: V72.84, 424.1      PATIENT                 Name: Darryl Pruett : 1932 (88 yrs)   Address: [place], [address] Sex: Female   City: [facility] [address]         Marital Status: Single   Employer: RETIRED         Taoism: Alevism   Primary Care Provider: Sumit Park MD         Primary Phone: [phone]   EMERGENCY CONTACT   Contact Name Legal Guardian? Relationship to Patient Home Phone Work Phone   1. Marquita López Sr  2. Kurt Chain      Other  Other [phone]              GUARANTOR            Guarantor: Susan Herrera     : 1932   Address: [facility] * Sex: Female     [place] [address]     Relation to Patient: Self       Home Phone: [phone]   Guarantor ID: [id number]       Work Phone:     Guarantor Employer: RETIRED         Status: RETIRED      COVERAGE        PRIMARY INSURANCE   Payor: MEDICARE Plan: MEDICARE PART A AND B   Payor Address: [facility] [address],  [place] [address], [facility] [address]       Group Number:   Insurance Type: INDEMNITY   Subscriber Name: Andria Stevens : 1932   Subscriber ID: [id number] Pat. Rel. to Sub: Self   SECONDARY INSURANCE   Payor: UNITED HE[employer]CARE Plan: Isabel Johnson 61*   Payor Address:  [facility] [id number], [place], [place] [address]          Group Number:   Insurance Type: [id number] Name: Andria Stevens : 1932   Subscriber ID: [id number] Pat.  Rel. to Sub: SELF

## 2021-05-19 NOTE — ANESTHESIA PRE PROCEDURE
Department of Anesthesiology  Preprocedure Note       Name:  Darcie Hodges   Age:  80 y.o.  :  1932                                          MRN:  [id number]         Date:  2021      Surgeon: Donny Mcfarland):  Tyrone Putnam, MD Damita Habermann, MD    Procedure: Procedure(s):  TRANSCATHETER AORTIC VALVE REPLACEMENT RIGHT FEMORAL APPROACH--MEDTRONIC  TRANSESOPHAGEAL ECHOCARDIOGRAM    Medications prior to admission:   Prior to Admission medications    Medication Sig Start Date End Date Taking?  Authorizing Provider   melatonin 1 MG tablet Take 1 mg by mouth nightly as needed for Sleep   Yes Historical Provider, MD   apixaban (ELIQUIS) 2.5 MG TABS tablet Take 2.5 mg by mouth 2 times daily   Yes Historical Provider, MD   digoxin (LANOXIN) 125 MCG tablet Take 1 tablet by mouth daily 21  Yes Kevin Joel DO   furosemide (LASIX) 40 MG tablet Take 1 tablet by mouth 2 times daily Take 2 tablets daily unless weight is below 138lbs then take 1 tablet daily 21  Yes Kevin Joel DO   senna (SENOKOT) 8.6 MG tablet One at bedtime 21  Yes Bozena Little MD   acetaminophen (MAPAP ARTHRITIS PAIN) 650 MG extended release tablet TAKE 2 TABLETS BY MOUTH EVERY EIGHT HOURS. 21  Yes Bozena Little MD   isosorbide mononitrate (IMDUR) 30 MG extended release tablet TAKE 1 TABLET BY MOUTH DAILY 21  Yes Bozena Little MD   spironolactone (ALDACTONE) 25 MG tablet Take 1 tablet by mouth daily 3/30/21  Yes Juli Lipscomb MD   fluticasone-vilanterol (BREO ELLIPTA) 100-25 MCG/INH AEPB inhaler Inhale into the lungs daily   Yes Historical Provider, MD   leflunomide (ARAVA) 20 MG tablet Take 1 tablet by mouth daily 3/22/21  Yes Bozena Little MD   metoprolol tartrate (LOPRESSOR) 25 MG tablet Take 1 tablet by mouth 2 times daily 21  Yes Bozena Little MD   Multiple Vitamins-Minerals [facility] ADULT 50+) CAPS Take 1 capsule by mouth daily 20  Yes Bozena Little MD   predniSONE (DANNA) 5 MG TBEC One pill daily DENNIS Kellogg           Allergies: Allergies   Allergen Reactions    Cardizem [Diltiazem Hcl] Rash    Nsaids Other (See Comments)     Kidney insufficiency    Tape Verl Scrape Tape] Rash       Problem List:    Patient Active Problem List   Diagnosis Code    Cataract, nuclear [id number]    Senile osteoporosis M81.0    Closed fracture of distal end of radius S52.509A    Left wrist pain M25.532    Post-traumatic osteoarthritis of both hips M16.4    DDD (degenerative disc disease), lumbar M51.36    History of compression fracture of spine Z87.81    Decreased bone density M85.80    Thrombus- left lower lobe I82.90    Pleural effusion J90    COPD (chronic obstructive pulmonary disease) ([facility]) J44.9    Critical aortic valve stenosis I35.0    Persistent atrial fibrillation ([facility]) I48.19    Non-rheumatic mitral valve disease I34.9    Moderate to severe pulmonary hypertension ([facility]) I27.20       Past Medical History:        Diagnosis Date    Arthritis     Asthma     Calf DVT (deep venous thrombosis) ([facility]) rt leg    1998    COPD (chronic obstructive pulmonary disease) ([facility])     Hemorrhoids, internal     Hx of blood clots     Hypertension     Polymyalgia ([facility])     PONV (postoperative nausea and vomiting)     Renal insufficiency     Rosacea keratitis     Shingles     Sleep apnea 05/01/2014    bi-pap @ night    UTI (lower urinary tract infection) 06/12/2013    Volume overload        Past Surgical History:        Procedure Laterality Date    CATARACT REMOVAL WITH IMPLANT Right 7 8 13    CATARACT REMOVAL WITH IMPLANT Left 09/09/13    CHOLECYSTECTOMY      COLONOSCOPY      DILATION AND CURETTAGE OF UTERUS  2011    hysteroscopy    ENDOMETRIAL BIOPSY      FRACTURE SURGERY      FRACTURE SURGERY  1995    Rt.  Wrist    KYPHOSIS SURGERY      LOBECTOMY  rt upper    OTHER SURGICAL HISTORY Left 9-15-15    ORIF left wrist    VASCULAR SURGERY  2014    endovenous laser for leaking    VERTEBROPLASTY Social History:    Social History     Tobacco Use    Smoking status: Never Smoker    Smokeless tobacco: Never Used   Substance Use Topics    Alcohol use: No                                Counseling given: Not Answered      Vital Signs (Current):   Vitals:    05/19/21 1033   BP: 135/72   Pulse: 101   Resp: 20   Temp: 36.6 °C (97.8 °F)   TempSrc: Temporal   SpO2: 98%   Weight: 134 lb (60.8 kg)   Height: 5' 1\" (1.549 m)                                              BP Readings from Last 3 Encounters:   05/19/21 135/72   05/13/21 106/74   05/04/21 122/76       NPO Status: Time of last liquid consumption: 2130                        Time of last solid consumption: 2130                        Date of last liquid consumption: 05/18/21                        Date of last solid food consumption: 05/18/21    BMI:   Wt Readings from Last 3 Encounters:   05/19/21 134 lb (60.8 kg)   05/13/21 134 lb (60.8 kg)   05/04/21 134 lb 9.6 oz (61.1 kg)     Body mass index is 25.32 kg/m². CBC:   Lab Results   Component Value Date    WBC 7.5 05/13/2021    RBC 4.77 05/13/2021    HGB 13.4 05/13/2021    HCT 44.2 05/13/2021    MCV 92.7 05/13/2021    RDW 12.7 05/13/2021     05/13/2021       CMP:   Lab Results   Component Value Date     05/13/2021    K 3.6 05/13/2021    K 3.3 03/29/2021    CL 91 05/13/2021    CO2 41 05/13/2021    BUN 20 05/13/2021    CREATININE 0.8 05/13/2021    GFRAA >60 05/13/2021    LABGLOM >60 05/13/2021    GLUCOSE 89 05/13/2021    PROT 7.3 05/13/2021    CALCIUM 10.5 05/13/2021    BILITOT 0.5 05/13/2021    ALKPHOS 64 05/13/2021    AST 24 05/13/2021    ALT 21 05/13/2021       POC Tests: No results for input(s): POCGLU, POCNA, POCK, POCCL, POCBUN, POCHEMO, POCHCT in the last 72 hours.     Coags:   Lab Results   Component Value Date    PROTIME 11.6 05/13/2021    INR 1.1 05/13/2021    APTT 37.0 05/13/2021       HCG (If Applicable): No results found for: PREGTESTUR, PREGSERUM, HCG, HCGQUANT     ABGs: No results found for: PHART, PO2ART, FDU3MPW, QZQ1ETX, BEART, E6BPTQGB     Type & Screen (If Applicable):  No results found for: LABABO, LABRH    Drug/Infectious Status (If Applicable):  No results found for: HIV, HEPCAB    COVID-19 Screening (If Applicable):   Lab Results   Component Value Date    COVID19 Not Detected 03/24/2021           Anesthesia Evaluation  Patient summary reviewed and Nursing notes reviewed   history of anesthetic complications: PONV. Airway: Mallampati: II  TM distance: >3 FB   Neck ROM: full  Mouth opening: > = 3 FB Dental:          Pulmonary: breath sounds clear to auscultation  (+) COPD:  sleep apnea: on noncompliant,  asthma:                            Cardiovascular:  Exercise tolerance: poor (<4 METS),   (+) hypertension:, valvular problems/murmurs: AS, murmur,       ECG reviewed  Rhythm: regular  Rate: normal  Echocardiogram reviewed         Beta Blocker:  Dose within 24 Hrs         Neuro/Psych:   (+) neuromuscular disease:, depression/anxiety             GI/Hepatic/Renal:   (+) GERD: well controlled, renal disease: CRI,           Endo/Other:    (+) : arthritis:., .                 Abdominal:           Vascular:   + DVT, . Anesthesia Plan      MAC     ASA 4     (20 gauge left forearm)  Induction: intravenous. arterial line, BIS, KAREY and central line  MIPS: Postoperative opioids intended and Prophylactic antiemetics administered. Anesthetic plan and risks discussed with patient. Use of blood products discussed with patient whom consented to blood products. Plan discussed with attending.                   Mary Petersen RN   5/19/2021

## 2021-05-19 NOTE — ANESTHESIA PROCEDURE NOTES
Arterial Line:    An arterial line was placed using surface landmarks, in the holding area for the following indication(s): continuous blood pressure monitoring and blood sampling needed. A 20 gauge (size), 1 and 3/4 inch (length), Arrow (type) catheter was placed, Seldinger technique not used, into the right radial artery, secured by tape. Anesthesia type: Local  Local infiltration: Injection    Events:  patient tolerated procedure well with no complications. 5/19/2021 12:00 PM5/19/2021 12:05 PM  Anesthesiologist: Heidy Quevedo MD  Other anesthesia staff: Aria Hauser RN  Performed:  Other anesthesia staff   Preanesthetic Checklist  Completed: patient identified, IV checked, site marked, risks and benefits discussed, surgical consent, monitors and equipment checked, pre-op evaluation, timeout performed, anesthesia consent given, oxygen available and patient being monitored

## 2021-05-19 NOTE — OP NOTE
TRANSCATHETER AORTIC VALVE REPLACEMENT  Operative Note      Date of procedure:  05/19/21      Interventional Cardiologist: Estelle Hobbs MD  Cardiothoracic Surgeon: Blossom Pace MD    Pre-operative diagnosis: Severe symptomatic aortic stenosis. Post-operative diagnosis: Successful transcatheter aortic valve replacement (TAVR) using a 26-mm Evolut PRO+ valve. nursing home (Major co morbidities and conditions):   HFpEF  AF on apixaban  Segmental LLL PE 3/2021  Bilateral pleural effusion  HTN  CKD  Remote history of DVT  COPD  CHRISTOPHER on BIPAP  Chronic immunosuppression  Severe kyphosis  CHRISTOPHER on BIPAP  Moderate to severe PH  NYHA class 3b    STS-PROM estimated risk for 30-day mortality for AVR:  18.9%    TAVR access: right common femoral artery percutaneous approach  Pigtail access: left common femoral artery  Appropriate arterial and venous lines were additionally placed by anesthesia as needed. TAVR access closure: Proglide Perclose   Pigtail access closure: Angioseal    Procedure:   Arterial access in the TAVR and Pigtail access sites as above  Limited peripheral angiography   Temporary transvenous pacer insertion via left femoral vein   Aortic root angiography  Transfemoral TAVR using a 26-mm Evolut PRO+ valve      Anesthesia: monitored anesthesia care (MAC)    Indication for procedure:   Severe symptomatic aortic stenosis      Description of the Procedure: Following informed consent, the patient was bought to the hybrid OR and placed under anesthesia as above. Transesophageal echo was used to aid in guidance of the procedure. Using ultrasound guidance and angiographic confirmation, a a micropuncture needle  was used to access the RCFA and a placeholder sheath was placed; two Proglide Perclose devices were placed in a \"pre-close\" fashion; this access will be used to deliver the trascatheter heart valve eventually.  Using ultrasound guidance, a micropuncture needle was used to access the LCFA and appropriate site was confirmed using angiography and a 5F sheath was placed; this access will be used for the Pigtail catheter subsequently. The left femoral vein was accessed and a locking 6F sheath was placed for the temporary pacer. Unfractionated heparin was administered at 100 units/kg to achieve ACT>300s with additional heparin administered as needed     A balloon-tipped temporary transvenous pacemaker was inserted through the femoral venous sheath and into the RV apex. Pacing threshold were obtained and the pacemaker was placed in standby mode. A 5F multipurpose catheter was advanced into the descending aorta over a J wire and used to place a Lunderquist wire in the descending aorta. An 18F Medtronic sheath was placed over the Lunderquist wire and sutured in place; the wire was subsequently removed. Next, a 5F AL1 catheter was used to guide a straight-tipped 0.035\" wire to cross the aortic valve. The catheter was advanced into the left ventricle and then exchanged for a second Pigtail catheter. A(n) Safari wire was then placed in the LV and the Pigtail catheter removed. Balloon aortic valvuloplasty was performed prior to [facility] placement with an 18-mm ZMed balloon. A 26-mm Evolut PRO+ delivery system with an inline sheath was advanced around the arch into the LV without difficulty. Rapid pacing at 160 bpm was performed while deploying the valve up to 80%. Multiple fluoroscopic views were obtained to ensure proper depth of the valve implant while eliminating parallax. This was estimated to be 1 mm at the noncoronary cusp. Subsequently the valve was released and the delivery system was withdrawn to the descending thoracic aorta and the nose cone recaptured. The delivery system and the stiff wire were removed from the body and the large sheath was then placed back. 2 perpendicular views were again obtained to ensure valve expansion and proper placement. Post-dilation was not performed.     TTE

## 2021-05-19 NOTE — H&P
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 She reports progressive dyspnea over the past few months as well as presyncope and lower extremity edema which is managed with twice daily furosemide. She denies chest pain and syncope and palpitations. She is compliant with her medications. After heart team discussion, she was felt best served with TAVR followed by observation +/- KAREY evaluation of her mitral disease post TAVR. STS risk score is calculated at 18.9%. Allergies: Allergies   Allergen Reactions    Cardizem [Diltiazem Hcl] Rash    Nsaids Other (See Comments)     Kidney insufficiency    Tape [Adhesive Tape] Rash       Home medications:    No current facility-administered medications for this encounter.      Current Outpatient Medications   Medication Sig Dispense Refill    melatonin 1 MG tablet Take 1 mg by mouth nightly as needed for Sleep      albuterol (PROVENTIL) (2.5 MG/3ML) 0.083% nebulizer solution Take 2.5 mg by nebulization every 6 hours as needed for Wheezing      apixaban (ELIQUIS) 2.5 MG TABS tablet Take 2.5 mg by mouth 2 times daily      digoxin (LANOXIN) 125 MCG tablet Take 1 tablet by mouth daily 30 tablet 5    furosemide (LASIX) 40 MG tablet Take 1 tablet by mouth 2 times daily Take 2 tablets daily unless weight is below 138lbs then take 1 tablet daily 60 tablet 5    polyethylene glycol (GLYCOLAX) 17 GM/SCOOP powder Take 17 g by mouth daily 510 g 11  senna (SENOKOT) 8.6 MG tablet One at bedtime 30 tablet 11    acetaminophen (MAPAP ARTHRITIS PAIN) 650 MG extended release tablet TAKE 2 TABLETS BY MOUTH EVERY EIGHT HOURS. 180 tablet 0    isosorbide mononitrate (IMDUR) 30 MG extended release tablet TAKE 1 TABLET BY MOUTH DAILY 30 tablet 11    spironolactone (ALDACTONE) 25 MG tablet Take 1 tablet by mouth daily 30 tablet 3    fluticasone-vilanterol (BREO ELLIPTA) 100-25 MCG/INH AEPB inhaler Inhale into the lungs daily      leflunomide (ARAVA) 20 MG tablet Take 1 tablet by mouth daily 30 tablet 2    metoprolol tartrate (LOPRESSOR) 25 MG tablet Take 1 tablet by mouth 2 times daily 60 tablet 11    Multiple Vitamins-Minerals (OCUVITE ADULT 50+) CAPS Take 1 capsule by mouth daily 30 capsule 11    predniSONE (DANNA) 5 MG TBEC One pill daily at bedtime with food 30 tablet 5    loratadine (CLARITIN) 10 MG tablet One daily at bedtime 30 tablet 11    calcium carbonate (CALCIUM 600) 600 MG TABS tablet Take 1 tablet by mouth 2 times daily 30 tablet 11    vitamin D (CHOLECALCIFEROL) 50 MCG (2000 UT) TABS tablet Take 1 tablet by mouth daily 30 tablet 11    Wheat Dextrin (BENEFIBER) POWD One-half teaspoon with breakfast daily 1 Can 11    Flaxseed, Linseed, (FLAXSEED OIL PO) Take 1,000 mg by mouth daily       zoledronic acid (RECLAST) 5 MG/100ML SOLN Infuse 5 mg intravenously once Yearly      therapeutic multivitamin-minerals (THERAGRAN-M) tablet Take 1 tablet by mouth daily.            Past Medical History:  Past Medical History:   Diagnosis Date    Arthritis     Asthma     Calf DVT (deep venous thrombosis) ([facility]) rt leg    1998    COPD (chronic obstructive pulmonary disease) ([facility])     Hemorrhoids, internal     Hx of blood clots     Hypertension     Polymyalgia ([facility])     PONV (postoperative nausea and vomiting)     Renal insufficiency     Rosacea keratitis     Shingles     Sleep apnea 05/01/2014    bi-pap @ night    UTI (lower urinary tract infection) 06/12/2013    Volume overload        Past Surgical History:  Past Surgical History:   Procedure Laterality Date    CATARACT REMOVAL WITH IMPLANT Right 7 8 13    CATARACT REMOVAL WITH IMPLANT Left 09/09/13    CHOLECYSTECTOMY      COLONOSCOPY      DILATION AND CURETTAGE OF UTERUS  2011    hysteroscopy    ENDOMETRIAL BIOPSY      FRACTURE SURGERY      FRACTURE SURGERY  1995    Rt. Wrist    KYPHOSIS SURGERY      LOBECTOMY  rt upper    OTHER SURGICAL HISTORY Left 9-15-15    ORIF left wrist    VASCULAR SURGERY  2014    endovenous laser for leaking    VERTEBROPLASTY         Social History:  Social History     Socioeconomic History    Marital status: Single     Spouse name: Not on file    Number of children: Not on file    Years of education: Not on file    Highest education level: Not on file   Occupational History    Not on file   Tobacco Use    Smoking status: Never Smoker    Smokeless tobacco: Never Used   Substance and Sexual Activity    Alcohol use: No    Drug use: No    Sexual activity: Not on file   Other Topics Concern    Not on file   Social History Narrative    Not on file     Social Determinants of Health     Financial Resource Strain:     Difficulty of Paying Living Expenses:    Food Insecurity:     Worried About Running Out of Food in the Last Year:     Ran Out of Food in the Last Year:    Transportation Needs:     Lack of Transportation (Medical):      Lack of Transportation (Non-Medical):    Physical Activity:     Days of Exercise per Week:     Minutes of Exercise per Session:    Stress:     Feeling of Stress :    Social Connections:     Frequency of Communication with Friends and Family:     Frequency of Social Gatherings with Friends and Family:     Attends Druze Services:     Active Member of Clubs or Organizations:     Attends Club or Organization Meetings:     Marital Status:    Intimate Partner Violence:     Fear of Current or Ex-Partner:     Emotionally Abused:     Physically Abused:     Sexually Abused:        Family History:  Family History   Problem Relation Age of Onset    Heart Disease Mother     Kidney Disease Father     Heart Disease Sister     Cancer Brother        Review of Systems:  Constitutional: Denies fevers, chills, or weight loss. HEENT: Denies visual changes or hearing loss. Heart: As per HPI. Lungs: Denies shortness of breath, cough, or wheezing. Gastrointestinal: Denies nausea, vomiting, constipation, or diarrhea. Genitourinary: dysuria or hematuria. Psychiatric: Patient denies anxiety or depression. Neurologic: Patient denies weakness of the extremities, dizziness, or headaches. All other ROS checked and found to be negative. Objective:  Vitals There were no vitals taken for this visit. General Appearance: Pleasant 80y.o. year old female who appears stated age. Communicates well, no acute distress. HEENT: Head is normocephalic, atraumatic. EOMs intact, PERRL. Trachea midline. Lungs: Normal respiratory rate and normal effort. She is not in respiratory distress. Breath sounds clear to auscultation. No wheezes. Heart: Normal rate. Regular rhythm. S1 normal and S2 normal. Positive for murmur. Chest: Symmetric chest wall expansion. Extremities: Normal range of motion. Neurological: Patient is alert and oriented to person, place and time. Skin: Warm and dry. Abdomen: Abdomen is soft and non-distended. Bowel sounds are normal.   Pulses: Distal pulses are intact. Skin: Warm and dry without lesions. Assessment:   1. Critical aortic stenosis   2. Chronic HFpEF - NYHA class IIIb  3. Severe calcific mitral valve disease   4. Moderate-severe pulmonary HTN  5. Severe COPD on home oxygen  6. AFib - on eliquis   7. HTN  8. CKD  9. Chronic immunosuppression  10. Significant kyphosis   11. E. Coli UTI noted on preoperative urine culture    Plan:   1.  TAVR  2. preop antibiotics and ID opinion re:

## 2021-05-19 NOTE — PROGRESS NOTES
HEART VALVE TEAM PROGRESS NOTE    Evaluated in ICU POD # 0. No complaints. No pain in groins. No issues since admission to ICU. PE:   BP (!) 140/59   Pulse 95   Temp 97 °F (36.1 °C) (Temporal)   Resp 27   Ht 5' 1\" (1.549 m)   Wt 134 lb (60.8 kg)   SpO2 95%   BMI 25.32 kg/m²   CARDIOVASCULAR:   Heart Inspection shows no noted pulsations  Heart Palpation: no heaves or thrills  Heart Ausculation -Normal S1 and S2, RRR, no murmur, s3, s4 or rub noted. PV: No extremity edema. No varicosities. Pedal pulses palpable, no clubbing or cyanosis     Monitor: atrial fibrillation       Lab Review     No results for input(s): WBC, HGB, HCT, PLT in the last 72 hours. No results for input(s): NA, K, CL, CO2, PHOS, BUN, CREATININE in the last 72 hours. Invalid input(s): CA    No results for input(s): AST, ALT, ALB, BILIDIR, ALKPHOS in the last 72 hours. No results for input(s): BNP, CKTOTAL, CKMB in the last 72 hours. No results for input(s): BNP, INR in the last 72 hours. Assessment:  1. POD # 0 TAVR with 26-mm Evolut PRO+ valve. Hemodynamically stable. 2. Stable groin sites bilaterally  3. CHRISTOPHER, COPD on home O2  4. Atrial fibrillation and PE 3/2021 on eliquis   5. Kyphosis   6. Chronic immunosuppression   7. HFpEF  8. UTI-ID consulted     Plan:  1. Continue ICU care. Transfer after 6 hours if stable   2.  ASA and eliquis

## 2021-05-19 NOTE — ANESTHESIA POSTPROCEDURE EVALUATION
Department of Anesthesiology  Postprocedure Note    Patient: Negin Soto  MRN: [id number]  YOB: 1932  Date of evaluation: 5/19/2021  Time:  4:45 PM     Procedure Summary     Date: 05/19/21 Room / Location: [facility] 03 / [facility]    Anesthesia Start: 9228 Anesthesia Stop: 1618    Procedures:       TRANSCATHETER AORTIC VALVE REPLACEMENT RIGHT FEMORAL APPROACH (Right )      TRANSESOPHAGEAL ECHOCARDIOGRAM (N/A ) Diagnosis: (AORTIC STENOSIS)    Surgeons: Barb Corral MD Responsible Provider: Nanette Mandel MD    Anesthesia Type: MAC ASA Status: 4          Anesthesia Type: MAC    Liliam Phase I: Lliiam Score: 10    Liliam Phase II:      Last vitals: Reviewed and per EMR flowsheets.        Anesthesia Post Evaluation    Patient location during evaluation: PACU  Patient participation: complete - patient participated  Level of consciousness: awake  Pain score: 0  Airway patency: patent  Nausea & Vomiting: no nausea  Complications: no  Cardiovascular status: hemodynamically stable  Respiratory status: acceptable  Hydration status: stable

## 2021-05-19 NOTE — ANESTHESIA PROCEDURE NOTES
Central Venous Line:    A central venous line was placed using ultrasound guidance, in the procedure area for the following indication(s): central venous access and CVP monitoring. 5/19/2021 12:15 PM5/19/2021 12:30 PM    Sterility preparation included the following: hand hygiene performed prior to procedure, maximum sterile barriers used and sterile technique used to drape from head to toe. The patient was placed in Trendelenburg position. The right internal jugular vein was prepped. The site was prepped with Chloraprep. A 8.5 Fr (size), introducer slick was placed. During the procedure, the following specific steps were taken: target vein identified, needle advanced into vein and blood aspirated and guidewire advanced into vein. Intravenous verification was obtained by ultrasound and venous blood return. Post insertion care included: all ports aspirated, all ports flushed easily, guidewire removed intact, line sutured in place and dressing applied. During the procedure the patient experienced: patient tolerated procedure well with no complications. Insertion site scrubbed per usage guidelines?: Yes  Skin prep agent dried for 3 minutes prior to procedure?:yes  Anesthesia type: local..No  Staffing  Performed:  Other and Anesthesiologist   Anesthesiologist: Shashank Hopson MD  Other anesthesia staff: Jael Burroughs RN  Preanesthetic Checklist  Completed: patient identified, IV checked, site marked, risks and benefits discussed, surgical consent, monitors and equipment checked, pre-op evaluation, timeout performed, anesthesia consent given, oxygen available and patient being monitored

## 2021-05-19 NOTE — OP NOTE
TRANSCATHETER AORTIC VALVE REPLACEMENT  Operative Note        Date of procedure:  05/19/21        Interventional Cardiologist: Liza Dillon MD  Cardiothoracic Surgeon: Ada Gomes MD     Pre-operative diagnosis: Severe symptomatic aortic stenosis. Post-operative diagnosis: Successful transcatheter aortic valve replacement (TAVR) using a 26-mm Evolut PRO+ valve.      [facility] (Major co morbidities and conditions):   HFpEF  AF on apixaban  Segmental LLL PE 3/2021  Bilateral pleural effusion  HTN  CKD  Remote history of DVT  COPD  CHRISTOPHER on BIPAP  Chronic immunosuppression  Severe kyphosis  CHRISTOPHER on BIPAP  Moderate to severe PH  NYHA class 3b     STS-PROM estimated risk for 30-day mortality for AVR:  18.9%     TAVR access: right common femoral artery percutaneous approach  Pigtail access: left common femoral artery  Appropriate arterial and venous lines were additionally placed by anesthesia as needed.     TAVR access closure: Proglide Perclose   Pigtail access closure: Angioseal     Procedure:   Arterial access in the TAVR and Pigtail access sites as above  Limited peripheral angiography   Temporary transvenous pacer insertion via left femoral vein   Aortic root angiography  Transfemoral TAVR using a 26-mm Evolut PRO+ valve        Anesthesia: monitored anesthesia care (MAC)     Indication for procedure:   Severe symptomatic aortic stenosis        Description of the Procedure: Following informed consent, the patient was bought to the hybrid OR and placed under anesthesia as above. Transesophageal echo was used to aid in guidance of the procedure.      Using ultrasound guidance and angiographic confirmation, a a micropuncture needle  was used to access the RCFA and a placeholder sheath was placed; two Proglide Perclose devices were placed in a \"pre-close\" fashion; this access will be used to deliver the trascatheter heart valve eventually.  Using ultrasound guidance, a micropuncture needle was used to access the LCFA and appropriate site was confirmed using angiography and a 5F sheath was placed; this access will be used for the Pigtail catheter subsequently. The left femoral vein was accessed and a locking 6F sheath was placed for the temporary pacer.      Unfractionated heparin was administered at 100 units/kg to achieve ACT>300s with additional heparin administered as needed     A balloon-tipped temporary transvenous pacemaker was inserted through the femoral venous sheath and into the RV apex. Pacing threshold were obtained and the pacemaker was placed in standby mode.      A 5F multipurpose catheter was advanced into the descending aorta over a J wire and used to place a Lunderquist wire in the descending aorta. An 18F Medtronic sheath was placed over the Lunderquist wire and sutured in place; the wire was subsequently removed.     Next, a 5F AL1 catheter was used to guide a straight-tipped 0.035\" wire to cross the aortic valve. The catheter was advanced into the left ventricle and then exchanged for a second Pigtail catheter.  A(n) Safari wire was then placed in the LV and the Pigtail catheter removed.       Balloon aortic valvuloplasty was performed prior to THV placement with an 18-mm ZMed balloon.     A 26-mm Evolut PRO+ delivery system with an inline sheath was advanced around the arch into the LV without difficulty. Rapid pacing at 160 bpm was performed while deploying the valve up to 80%.   Multiple fluoroscopic views were obtained to ensure proper depth of the valve implant while eliminating parallax.  This was estimated to be 1 mm at the noncoronary cusp.  Subsequently the valve was released and the delivery system was withdrawn to the descending thoracic aorta and the nose cone recaptured.  The delivery system and the stiff wire were removed from the body and the large sheath was then placed back.  2 perpendicular views were again obtained to ensure valve expansion and proper placement.       Post-dilation was not

## 2021-05-20 NOTE — PLAN OF CARE
Problem: Falls - Risk of:  Goal: Will remain free from falls  Description: Will remain free from falls  Outcome: Met This Shift  Goal: Absence of physical injury  Description: Absence of physical injury  Outcome: Met This Shift     Problem: Urinary Elimination:  Goal: Ability to reestablish a normal urinary elimination pattern will improve - after catheter removal  Description: Ability to reestablish a normal urinary elimination pattern will improve  Outcome: Met This Shift

## 2021-05-20 NOTE — PLAN OF CARE
Problem: Falls - Risk of:  Goal: Will remain free from falls  Description: Will remain free from falls  5/20/2021 0930 by Leonel Sanchez RN  Outcome: Met This Shift     Problem: Breathing Pattern - Ineffective:  Goal: Ability to achieve and maintain a regular respiratory rate will improve  Description: Ability to achieve and maintain a regular respiratory rate will improve  Outcome: Met This Shift

## 2021-05-20 NOTE — PROGRESS NOTES
TAVR TEAM PROGRESS NOTE      POD # 1 s/p TAVR with 26-mm Evolut PRO+; Anthony Thomas/Kavita    TAVR access: right common femoral artery percutaneous approach  Pigtail access: left common femoral artery    Subjective:    Sitting up in chair; complains that she is \"not breathing as well as I was before\"    She uses 3L NC oxygen at home; currently on 2L (down from 5L) after breathing treatment   Denies any other complaints   Vitals stable, labs stable      Current Facility-Administered Medications   Medication Dose Route Frequency Provider Last Rate Last Admin    acetaminophen (TYLENOL) tablet 650 mg  650 mg Oral 3 times per day Winona Fleeting, PA   650 mg at 05/20/21 0625    albuterol (PROVENTIL) nebulizer solution 2.5 mg  2.5 mg Nebulization Q6H PRN Winona Fleeting, PA        apixaban (ELIQUIS) tablet 2.5 mg  2.5 mg Oral BID Felipe Fleeting, PA   2.5 mg at 05/20/21 6566    calcium carbonate (TUMS) chewable tablet 500 mg  500 mg Oral BID Winona Fleeting, PA        digoxin (LANOXIN) tablet 125 mcg  125 mcg Oral Daily Felipe Fleeting, [place]        furosemide (LASIX) tablet 40 mg  40 mg Oral BID Felipe Fleeting, PA   40 mg at 05/20/21 9737    isosorbide mononitrate (IMDUR) extended release tablet 30 mg  30 mg Oral Daily Winona Fleeting, PA        leflunomide (ARAVA) tablet 20 mg  20 mg Oral Daily Feliep Fleeting, PA        cetirizine (ZYRTEC) tablet 10 mg  10 mg Oral Daily Winona Fleeting, [place]        melatonin tablet 3 mg  3 mg Oral Nightly PRN Winona Fleeting, PA        metoprolol tartrate (LOPRESSOR) tablet 25 mg  25 mg Oral BID Winona Fleeting, PA   25 mg at 05/20/21 6081    ocuvite-lutein multivitamin 1 capsule  1 capsule Oral Daily Felipe Fleeting, PA        polyethylene glycol (GLYCOLAX) packet 17 g  17 g Oral Daily Winona Fleeting, [place]        predniSONE (DELTASONE) tablet 5 mg  5 mg Oral Daily Felipe Fleeting, [place]        senna (SENOKOT) tablet 8.6 mg  1 tablet Oral Nightly Luz Marina MORA DENNIS Schneider   8.6 mg at 05/19/21 2154    spironolactone (ALDACTONE) tablet 25 mg  25 mg Oral Daily Ekron Pry, [place]        therapeutic multivitamin-minerals 1 tablet  1 tablet Oral Daily Ekron Pry, [place]        vitamin D (CHOLECALCIFEROL) tablet 2,000 Units  2,000 Units Oral Daily Ekron Pry, PA        sodium chloride flush 0.9 % injection 5-40 mL  5-40 mL Intravenous 2 times per day Ekron Pry, PA   10 mL at 05/20/21 6770    sodium chloride flush 0.9 % injection 5-40 mL  5-40 mL Intravenous PRN Ekron Pry, PA   10 mL at 05/20/21 0625    0.9 % sodium chloride infusion  25 mL Intravenous PRN Ekron Pry, PA        ondansetron [facility]) injection 4 mg  4 mg Intravenous Q8H PRN Ekron Pry, PA        aspirin EC tablet 81 mg  81 mg Oral Daily Deyanira Freed        oxyCODONE-acetaminophen (PERCOCET) 5-325 MG per tablet 1 tablet  1 tablet Oral Q4H PRN Ekron Pry, PA        glucose (GLUTOSE) 40 % oral gel 15 g  15 g Oral PRN Ekron Pry, PA        dextrose 50 % IV solution  12.5 g Intravenous PRN Ekron Pry, PA        glucagon (rDNA) injection 1 mg  1 mg Intramuscular PRN Ekron Pry, PA        dextrose 5 % solution  100 mL/hr Intravenous PRN Ekron Pry, PA        ceFAZolin (ANCEF) 2000 mg in sterile water 20 mL IV syringe  2,000 mg Intravenous Q8H Ekron Pry, PA   2,000 mg at 05/20/21 0624    insulin lispro (HUMALOG) injection vial 0-12 Units  0-12 Units Subcutaneous TID WC Ekron Pry, PA        insulin lispro (HUMALOG) injection vial 0-6 Units  0-6 Units Subcutaneous Nightly Ekron Pry, PA        perflutren lipid microspheres (DEFINITY) injection 1.65 mg  1.5 mL Intravenous ONCE PRN Ekron Pry, PA        budesonide (PULMICORT) nebulizer suspension 250 mcg  0.25 mg Nebulization BID Ekron Pry, PA   250 mcg at 05/20/21 0840    And    Arformoterol Tartrate (BROVANA) nebulizer solution 15 mcg  15 mcg Nebulization BID DENNIS Monroe   15 mcg at 05/20/21 0840           Intake/Output Summary (Last 24 hours) at 5/20/2021 1100  Last data filed at 5/20/2021 0900  Gross per 24 hour   Intake 1690 ml   Output 900 ml   Net 790 ml       Patient Vitals for the past 96 hrs (Last 3 readings):   Weight   05/19/21 1033 134 lb (60.8 kg)          Physical Exam:  /83   Pulse 85   Temp 98.1 °F (36.7 °C) (Temporal)   Resp 12   Ht 5' 1\" (1.549 m)   Wt 134 lb (60.8 kg)   SpO2 98%   BMI 25.32 kg/m²     General: No acute distress, appears his stated age, nonicteric  Head: Atraumatic, no gross abnormalities or bruises  Neck: Supple and nontender, no carotid bruits, no JVP  Lungs: diminished breath sounds to auscultation bilaterally, no wheezes, rales, or rhonchi  Heart: Regular rate and rhythm, no murmurs, rubs, or gallops  Abdomen: Soft, nontender, nondistended, normal bowel sounds  Extremities: No obvious deformities, no cyanosis, no edema  Neurological: Alert and oriented x3, EOMI, moving all extremities x4  Psychological: Normal mood and affect, cooperative  Skin: Color, texture, and turgor normal for age     Access sites: minimal ecchymoses. No hematoma or pulsatile masses. Distal pulses normal b/l. Lab Review     Recent Labs     05/19/21  1642   WBC 11.7*   HGB 11.4*   HCT 37.3          Recent Labs     05/19/21  1642      K 3.4*   CL 95*   CO2 33*   BUN 20   CREATININE 0.7       No results for input(s): AST, ALT, ALB, BILIDIR, ALKPHOS in the last 72 hours. No results for input(s): BNP, CKTOTAL, CKMB in the last 72 hours. No results for input(s): BNP, INR in the last 72 hours. EKG pre-TAVR: atrial fibrillation, QRS 96    EKG today: atrial fibrillation, HR 82, QRS 96    POD1 TTE: done, results pending    CXR today: moderate bilateral pleural effusions, worse c/t 5/13/21. Left mid and lower lung opacity possible compressive atelectasis. Pneumonia cannot be excluded.  Images personally reviewed      Assessment:  1. Critical aortic stenosis s/p TAVR with 26 mm Evolut PRO+  2. HFpEF - compensated  3. Atrial fibrillation - on apixaban  4. HTN - controlled  5. CKD - stable  6. Severe COPD - home oxygen 3L  7. H/o DVT/PE  8. Severe kyphosis  9. Chronic immunosuppression  10. Mod-severe pHTN  11. Bilateral pleural effusions noted on CXR as above    NYHA class IIIb    Plan:  1. Continue current medications; increase oxygen to 3L   2. Hold apixaban  3. Consult pulmonary for possible thoracentesis  4. Antithrombotic therapy: aspirin plus apixaban 2.5  5. Access site check in 2 weeks  6. [facility] follow-up in 4 weeks  7. Endocarditis prophylaxis indefinitely before high-risk procedures  8. Inpatient followed by outpatient cardiac rehab  9.  Anticipate discharge next 24-48 hours    Discussed with Dr. Latonia Jurado PA-C

## 2021-05-20 NOTE — CONSULTS
[facility] CONSULT NOTE    Reason for Consult: Concern for UTI    Requested by: DENNIS Price     Chief complaint: Elective TAVR    History Obtained From: Patient and EMR    HISTORY OFPRESENT ILLNESS              The patient is a 80 y.o. female with history of COPD, atrial fibrillation, pulmonary embolism, hypertension, critical aortic stenosis, admitted on 05/19 for elective TAVR. She reports no fever, no chills, no abdominal pain, no diarrhea, no dysuria, no hematuria. She had perioperative work-up including urinalysis on 05/13 showing pyuria with packed WBCs and corresponding urine culture showing E. coli (non-ESBL, resistant to ampicillin, piperacillin-tazobactam, co-trimoxazole). On admission, she was afebrile and hemodynamically stable with no leukocytosis. She received a dose of gentamicin on admission. Cefazolin was started on admission for perioperative prophylaxis. ID service was subsequently consulted for further recommendations.     Past Medical History  Past Medical History:   Diagnosis Date    Arthritis     Asthma     Calf DVT (deep venous thrombosis) ([facility]) rt leg    1998    COPD (chronic obstructive pulmonary disease) ([facility])     Hemorrhoids, internal     Hx of blood clots     Hypertension     Polymyalgia ([facility])     PONV (postoperative nausea and vomiting)     Renal insufficiency     Rosacea keratitis     Shingles     Sleep apnea 05/01/2014    bi-pap @ night    UTI (lower urinary tract infection) 06/12/2013    Volume overload        Current Facility-Administered Medications   Medication Dose Route Frequency Provider Last Rate Last Admin    acetaminophen (TYLENOL) tablet 650 mg  650 mg Oral 3 times per day DENNIS Price   650 mg at 05/20/21 1349    albuterol (PROVENTIL) nebulizer solution 2.5 mg  2.5 mg Nebulization Q6H PRN DENNIS Price        [Held by provider] apixaban (ELIQUIS) tablet 2.5 mg  2.5 mg Oral BID DENNIS rPice   2.5 mg at 05/20/21 1209    calcium carbonate (TUMS) chewable tablet 500 mg  500 mg Oral BID [facility]        digoxin (LANOXIN) tablet 125 mcg  125 mcg Oral Daily Stanley, [place]        furosemide (LASIX) tablet 40 mg  40 mg Oral BID [facility]   40 mg at 05/20/21 3470    isosorbide mononitrate (IMDUR) extended release tablet 30 mg  30 mg Oral Daily [facility]        leflunomide (ARAVA) tablet 20 mg  20 mg Oral Daily [place], [place]        cetirizine (ZYRTEC) tablet 10 mg  10 mg Oral Daily [place], [place]        melatonin tablet 3 mg  3 mg Oral Nightly PRN [facility]        metoprolol tartrate (LOPRESSOR) tablet 25 mg  25 mg Oral BID Backus, PA   25 mg at 05/20/21 9900    ocuvite-lutein multivitamin 1 capsule  1 capsule Oral Daily [facility]        polyethylene glycol (GLYCOLAX) packet 17 g  17 g Oral Daily [place], [place]        predniSONE (DELTASONE) tablet 5 mg  5 mg Oral Daily [place], [place]        senna (SENOKOT) tablet 8.6 mg  1 tablet Oral Nightly [facility]   8.6 mg at 05/19/21 2154    spironolactone (ALDACTONE) tablet 25 mg  25 mg Oral Daily [place], [place]        therapeutic multivitamin-minerals 1 tablet  1 tablet Oral Daily [place], [place]        vitamin D (CHOLECALCIFEROL) tablet 2,000 Units  2,000 Units Oral Daily [facility]        sodium chloride flush 0.9 % injection 5-40 mL  5-40 mL Intravenous 2 times per day Nida Niño PA   10 mL at 05/20/21 9662    sodium chloride flush 0.9 % injection 5-40 mL  5-40 mL Intravenous PRN Nida Niño PA   10 mL at 05/20/21 0625    0.9 % sodium chloride infusion  25 mL Intravenous PRN Grant Renay PA        ondansetron [facility]) injection 4 mg  4 mg Intravenous Q8H PRN Nida Niño PA        aspirin EC tablet 81 mg  81 mg Oral Daily Deyanira Freed        oxyCODONE-acetaminophen (PERCOCET) 5-325 MG per tablet 1 tablet  1 tablet Oral Q4H PRN DENNIS Rod        glucose (GLUTOSE) 40 % oral gel 15 g  15 g Oral PRN DNENIS Rod        dextrose 50 % IV solution  12.5 g Intravenous PRN DENNIS Rod        glucagon (rDNA) injection 1 mg  1 mg Intramuscular PRN DENNIS Rod        dextrose 5 % solution  100 mL/hr Intravenous PRN DENNIS Rod        ceFAZolin (ANCEF) 2000 mg in sterile water 20 mL IV syringe  2,000 mg Intravenous Q8H DENNIS Rod   2,000 mg at 05/20/21 1349    perflutren lipid microspheres (DEFINITY) injection 1.65 mg  1.5 mL Intravenous ONCE PRN DENNIS Rod        budesonide (PULMICORT) nebulizer suspension 250 mcg  0.25 mg Nebulization BID DENNIS Rod   250 mcg at 05/20/21 0840    And    Arformoterol Tartrate (BROVANA) nebulizer solution 15 mcg  15 mcg Nebulization BID DENNIS Rod   15 mcg at 05/20/21 0840       Allergies   Allergen Reactions    Cardizem [Diltiazem Hcl] Rash    Nsaids Other (See Comments)     Kidney insufficiency    Tape Blase Shazia Tape] Rash       Surgical History  Past Surgical History:   Procedure Laterality Date    AORTIC VALVE REPLACEMENT Right 5/19/2021    TRANSCATHETER AORTIC VALVE REPLACEMENT RIGHT FEMORAL APPROACH performed [facility] Juan José [facility] at [facility] Right 7 8 13    CATARACT REMOVAL WITH IMPLANT Left 09/09/13    CHOLECYSTECTOMY      COLONOSCOPY      DILATION AND CURETTAGE OF UTERUS  2011    hysteroscopy    ENDOMETRIAL BIOPSY     [place]    Rt.  Wrist    KYPHOSIS SURGERY      LOBECTOMY  rt upper    OTHER SURGICAL HISTORY Left 9-15-15    ORIF left wrist    TRANSESOPHAGEAL ECHOCARDIOGRAM N/A 5/19/2021    TRANSESOPHAGEAL ECHOCARDIOGRAM performed by Juan José MD at [address]  2014    endovenous laser for leaking    VERTEBROPLASTY          Social History  Social History     Socioeconomic History    Marital status: Single Occupational History    Not on file   Tobacco Use    Smoking status: Never Smoker    Smokeless tobacco: Never Used   Substance and Sexual Activity    Alcohol use: No    Drug use: No       Family Medical History  Family History   Problem Relation Age of Onset    Heart Disease Mother     Kidney Disease Father     Heart Disease Sister     Cancer Brother        Review of Systems:  Constitutional: No fever, no chills  Eyes: No vision changes, no retroorbital pain  ENT: No hearing changes, no ear pain  Respiratory: No cough, no dyspnea  Cardiovascular: No chest pain, no palpitations  Gastrointestinal: No abdominal pain, no diarrhea  Genitourinary: No dysuria, no hematuria  Integumentary: No rash, no itching  Musculoskeletal: No muscle pain, no joint pain  Neurologic: No headache, no numbness in extremities    Physical Examination:  Vitals:    05/20/21 0840 05/20/21 0841 05/20/21 1113 05/20/21 1522   BP:   (!) 114/58 (!) 109/53   Pulse:   88 92   Resp: 12 12 18 18   Temp:   98 °F (36.7 °C) 98.1 °F (36.7 °C)   TempSrc:   Temporal Temporal   SpO2: 98% 98% 99% 95%   Weight:       Height:         Constitutional: Alert, not in distress  Eyes: Sclerae anicteric, no conjunctival erythema  ENT: No buccal lesion, no pharyngeal exudates  Neck: No nuchal rigidity, no cervical adenopathy  Lungs: Clear breath sounds, no crackles, no wheezes  Heart: Regular rate and rhythm, no murmurs  Abdomen: Bowel sounds present, soft, nontender  Skin: Warm and dry, no active dermatoses  Musculoskeletal: No joint erythema, no joint swelling    Labs, imaging, and medical records/notes were personally reviewed. Assessment:  Asymptomatic bacteriuria  Critical aortic stenosis, s/p TAVR on 05/19     Recommendations: On cefazolin until today for perioperative antibiotic prophylaxis per protocol.  No further antibiotics indicated afterwards. Continue supportive care. Thank you for involving me in the care of Faye Molina.  I will continue to follow. Please do not hesitate to call for any questions or concerns.     Electronically signed by Jalen Ray MD on 5/20/2021 at 3:44 PM

## 2021-05-20 NOTE — CARE COORDINATION
5/20/2021 - Care Coordination - Chart reviewed, POD#1. Pt is  Resident at [facility] - placed call to the [facility] and spoke with the nursing unit - pt will return to the medical wing when released from the hospital. She wears 3L NC normally, which she is using presently. The facility does provide 24 hour nursing care. The [facility] will provide transport back to the facility but only if before 4 PM. LUZMA/LUX will follow.

## 2021-05-21 NOTE — CARE COORDINATION
5/21/2021 - POD#2 TAVR. Cardiology following. Pulmonology consult completed. Pt for US guided thoracentesis for pleural effusion. Wearing 3L NC. Discharge plan remains return to the [address] [place] [address] unit for 24 hour nursing care when medically stable. SW/CM will follow.

## 2021-05-21 NOTE — CONSULTS
Pulmonary [facility]                             Pulmonary Consult/Progress Note :          Patient: Monika Goodson  MRN: [id number]  : 1932      Date of Admission: .2021 10:07 AM    Consulting Physician:DENNIS Ashton        Reason for Consultation:Acute hypoxic respiratory failure   Pleural effusion   CC : SOB   HPI:   Monika Goodson is a 80y.o. year old who states she never smoke in the past and on 2 L home O2 ,She has also history of DVT, CHRISTOPHER,She whas admitted for TAVR and she did well . She has CXR shows left side pleural effusion more than right     She resides at the [facility].     Patient follows cardiology, Dr. Angelica Garner, for CHF, volume overload,  On p.o. diuretic, is also to be on an ARB/B-Blocker. On AVARA and Prednisone     PAST MEDICAL HISTORY:     Past Medical History:   Diagnosis Date    Arthritis     Asthma     Calf DVT (deep venous thrombosis) ([facility]) rt leg        COPD (chronic obstructive pulmonary disease) ([facility])     Hemorrhoids, internal     Hx of blood clots     Hypertension     Polymyalgia ([address]     PONV (postoperative nausea and vomiting)     Renal insufficiency     Rosacea keratitis     Shingles     Sleep apnea 2014    bi-pap @ night    UTI (lower urinary tract infection) 2013    Volume overload        PAST SURGICAL HISTORY:   Past Surgical History:   Procedure Laterality Date    AORTIC VALVE REPLACEMENT Right 2021    TRANSCATHETER AORTIC VALVE REPLACEMENT RIGHT FEMORAL APPROACH performed by Ronel Hines MD at [facility] Right 7 8 13    CATARACT REMOVAL WITH IMPLANT Left 13    CHOLECYSTECTOMY      COLONOSCOPY      DILATION AND CURETTAGE OF UTERUS  2011    hysteroscopy    ENDOMETRIAL BIOPSY      FRACTURE SURGERY     [address]     Rt[address]  Wrist    KYPHOSIS SURGERY      LOBECTOMY  rt upper    OTHER SURGICAL HISTORY Left 9-15-15    ORIF left wrist    TRANSESOPHAGEAL ECHOCARDIOGRAM N/A 5/19/2021    TRANSESOPHAGEAL ECHOCARDIOGRAM performed by Kianna Kapadia MD at [facility]  2014    endovenous laser for leaking    VERTEBROPLASTY         FAMILY HISTORY:   Family History   Problem Relation Age of Onset    Heart Disease Mother     Kidney Disease Father     Heart Disease Sister     Cancer Brother        SOCIAL HISTORY:   Social History     Socioeconomic History    Marital status: Single     Spouse name: Not on file    Number of children: Not on file    Years of education: Not on file    Highest education level: Not on file   Occupational History    Not on file   Tobacco Use    Smoking status: Never Smoker    Smokeless tobacco: Never Used   Substance and Sexual Activity    Alcohol use: No    Drug use: No    Sexual activity: Not on file   Other Topics Concern    Not on file   Social History Narrative    Not on file     Social Determinants of Health     Financial Resource Strain:     Difficulty of Paying Living Expenses:    Food Insecurity:     Worried About Running Out of Food in the Last Year:     Ran Out of Food in the Last Year:    Transportation Needs:     Lack of Transportation (Medical):      Lack of Transportation (Non-Medical):    Physical Activity:     Days of Exercise per Week:     Minutes of Exercise per Session:    Stress:     Feeling of Stress :    Social Connections:     Frequency of Communication with Friends and Family:     Frequency of Social Gatherings with Friends and Family:     Attends [employer] Services:     Active Member of Clubs or Organizations:     Attends Club or Organization Meetings:     Marital Status:    Intimate Partner Violence:     Fear of Current or Ex-Partner:     Emotionally Abused:     Physically Abused:     Sexually Abused:      Social History     Tobacco Use   Smoking Status Never Smoker   Smokeless Tobacco Never Used     Social History     Substance and Sexual Activity   Alcohol Use No     Social History     Substance and Sexual Activity   Drug Use No         HOME MEDICATIONS:  Prior to Admission medications    Medication Sig Start Date End Date Taking?  Authorizing Provider   melatonin 1 MG tablet Take 1 mg by mouth nightly as needed for Sleep   Yes Historical Provider, MD   apixaban (ELIQUIS) 2.5 MG TABS tablet Take 2.5 mg by mouth 2 times daily   Yes Historical Provider, MD   digoxin (LANOXIN) 125 MCG tablet Take 1 tablet by mouth daily 5/11/21  Yes Lourena Ni, DO   furosemide (LASIX) 40 MG tablet Take 1 tablet by mouth 2 times daily Take 2 tablets daily unless weight is below 138lbs then take 1 tablet daily 5/11/21  Yes Lourena Ni, DO   senna (SENOKOT) 8.6 MG tablet One at bedtime 5/5/21  Yes Luis Enrique Lobato MD   acetaminophen (MAPAP ARTHRITIS PAIN) 650 MG extended release tablet TAKE 2 TABLETS BY MOUTH EVERY EIGHT HOURS. 4/12/21  Yes Luis Enrique Lobato MD   isosorbide mononitrate (IMDUR) 30 MG extended release tablet TAKE 1 TABLET BY MOUTH DAILY 4/2/21  Yes Luis Enrique Lobato MD   spironolactone (ALDACTONE) 25 MG tablet Take 1 tablet by mouth daily 3/30/21  Yes Jeanette Mir MD   fluticasone-vilanterol (BREO ELLIPTA) 100-25 MCG/INH AEPB inhaler Inhale into the lungs daily   Yes Historical Provider, MD   leflunomide (ARAVA) 20 MG tablet Take 1 tablet by mouth daily 3/22/21  Yes Luis Enrique Lobato MD   metoprolol tartrate (LOPRESSOR) 25 MG tablet Take 1 tablet by mouth 2 times daily 1/22/21  Yes Luis Enrique Lobato MD   Multiple Vitamins-Minerals [facility] ADULT 50+) CAPS Take 1 capsule by mouth daily 12/28/20  Yes Luis Enrique Lobato MD   predniSONE (DANNA) 5 MG TBEC One pill daily at bedtime with food 12/14/20  Yes Luis Enrique Lobato MD   loratadine (CLARITIN) 10 MG tablet One daily at bedtime 11/12/20  Yes Luis Enrique Lobato MD   calcium carbonate (CALCIUM 600) 600 MG TABS tablet Take 1 tablet by mouth 2 times daily 11/10/20  Yes Luis Enrique Lobato MD   vitamin D (CHOLECALCIFEROL) 50 MCG (2000 UT) TABS tablet Take 1 tablet by mouth daily 11/10/20  Yes Sheryl Browning MD   therapeutic multivitamin-minerals [facility]) tablet Take 1 tablet by mouth daily.      Yes Historical Provider, MD   albuterol (PROVENTIL) (2.5 MG/3ML) 0.083% nebulizer solution Take 2.5 mg by nebulization every 6 hours as needed for Wheezing    Historical Provider, MD   polyethylene glycol (GLYCOLAX) 17 GM/SCOOP powder Take 17 g by mouth daily 5/6/21 6/5/21  Sheryl Browning MD   Wheat Dextrin (BENEFIBER) POWD One-half teaspoon with breakfast daily 11/10/20   Sheryl Browning MD   Flaxseed, Linseed, (FLAXSEED OIL PO) Take 1,000 mg by mouth daily     Historical Provider, MD   zoledronic acid (RECLAST) 5 MG/100ML SOLN Infuse 5 mg intravenously once Yearly    Historical Provider, MD       CURRENT MEDICATIONS:  Current Facility-Administered Medications: acetaminophen (TYLENOL) tablet 650 mg, 650 mg, Oral, 3 times per day  albuterol (PROVENTIL) nebulizer solution 2.5 mg, 2.5 mg, Nebulization, Q6H PRN  [Held by provider] apixaban (ELIQUIS) tablet 2.5 mg, 2.5 mg, Oral, BID  calcium carbonate (TUMS) chewable tablet 500 mg, 500 mg, Oral, BID  digoxin (LANOXIN) tablet 125 mcg, 125 mcg, Oral, Daily  furosemide (LASIX) tablet 40 mg, 40 mg, Oral, BID  isosorbide mononitrate (IMDUR) extended release tablet 30 mg, 30 mg, Oral, Daily  leflunomide (ARAVA) tablet 20 mg, 20 mg, Oral, Daily  cetirizine (ZYRTEC) tablet 10 mg, 10 mg, Oral, Daily  melatonin tablet 3 mg, 3 mg, Oral, Nightly PRN  metoprolol tartrate (LOPRESSOR) tablet 25 mg, 25 mg, Oral, BID  ocuvite-lutein multivitamin 1 capsule, 1 capsule, Oral, Daily  polyethylene glycol (GLYCOLAX) packet 17 g, 17 g, Oral, Daily  predniSONE (DELTASONE) tablet 5 mg, 5 mg, Oral, Daily  senna (SENOKOT) tablet 8.6 mg, 1 tablet, Oral, Nightly  spironolactone (ALDACTONE) tablet 25 mg, 25 mg, Oral, Daily  therapeutic multivitamin-minerals 1 tablet, 1 tablet, Oral, Daily  vitamin D (CHOLECALCIFEROL) tablet 2,000 Units, 2,000 Units, Oral, Daily  sodium chloride flush 0.9 % injection 5-40 mL, 5-40 mL, Intravenous, 2 times per day  sodium chloride flush 0.9 % injection 5-40 mL, 5-40 mL, Intravenous, PRN  0.9 % sodium chloride infusion, 25 mL, Intravenous, PRN  ondansetron (ZOFRAN) injection 4 mg, 4 mg, Intravenous, Q8H PRN  aspirin EC tablet 81 mg, 81 mg, Oral, Daily  oxyCODONE-acetaminophen (PERCOCET) 5-325 MG per tablet 1 tablet, 1 tablet, Oral, Q4H PRN  glucose (GLUTOSE) 40 % oral gel 15 g, 15 g, Oral, PRN  dextrose 50 % IV solution, 12.5 g, Intravenous, PRN  glucagon (rDNA) injection 1 mg, 1 mg, Intramuscular, PRN  dextrose 5 % solution, 100 mL/hr, Intravenous, PRN  ceFAZolin (ANCEF) 2000 mg in sterile water 20 mL IV syringe, 2,000 mg, Intravenous, Q8H  perflutren lipid microspheres (DEFINITY) injection 1.65 mg, 1.5 mL, Intravenous, ONCE PRN  budesonide (PULMICORT) nebulizer suspension 250 mcg, 0.25 mg, Nebulization, BID **AND** Arformoterol Tartrate (BROVANA) nebulizer solution 15 mcg, 15 mcg, Nebulization, BID    IV MEDICATIONS:   sodium chloride      dextrose         ALLERGIES:  Allergies   Allergen Reactions    Cardizem [Diltiazem Hcl] Rash    Nsaids Other (See Comments)     Kidney insufficiency    Tape Jacklynn Catracho Tape] Rash       REVIEW OF SYSTEMS:  General ROS:  + weight loss ,no fatigue     ENT ROS:   No Sore throat ,no lymphoadenopathy,no nasal stuffiness     Hematological and Lymphatic ROS:   No ecchymosis ,no tendency to bleed  Respiratory ROS:   SOB and HANSON   Cardiovascular ROS:   No CP,No Palpitation   Gastrointestinal ROS:   No Gi bleed,no nausea or vomiting      - Musculoskeletal ROS:      - no joint swelling ,no joint pain   Neurological ROS:     -no weakness or numbness    Dermatological ROS:   No skin rash ,no urticaria     PHYSICAL EXAMINATION:     VITAL SIGNS:  /63   Pulse 99   Temp 97 °F (36.1 °C) (Temporal)   Resp 18   Ht 5' 1\" (1.549 m)   Wt 134 lb (60.8 kg)   SpO2 92%   BMI 25.32 kg/m²   Wt Readings from Last 3 Encounters:   05/19/21 134 lb (60.8 kg)   05/13/21 134 lb (60.8 kg)   05/04/21 134 lb 9.6 oz (61.1 kg)     Temp Readings from Last 3 Encounters:   05/20/21 97 °F (36.1 °C) (Temporal)   05/13/21 97.5 °F (36.4 °C) (Temporal)   03/29/21 96.8 °F (36 °C) (Temporal)     TMAX:  BP Readings from Last 3 Encounters:   05/20/21 139/63   05/13/21 106/74   05/04/21 122/76     Pulse Readings from Last 3 Encounters:   05/20/21 99   05/13/21 96   05/04/21 111           INTAKE/OUTPUTS:  I/O last 3 completed shifts: In: 7039 [P.O.:240;  I.V.:1400; IV Piggyback:50]  Out: 900 [Urine:850; Blood:50]    Intake/Output Summary (Last 24 hours) at 5/20/2021 2241  Last data filed at 5/20/2021 2122  Gross per 24 hour   Intake 480 ml   Output 420 ml   Net 60 ml       General Appearance: alert and oriented to person, place and time, well-developed and   well-nourished, in no acute distress   Eyes: pupils equal, round, and reactive to light, extraocular eye movements intact, conjunctivae normal and sclera anicteric   Neck: neck supple and non tender without mass, no thyromegaly, no thyroid nodules and no cervical adenopathy   Pulmonary/Chest:rhonchi bilateral   Cardiovascular: normal rate, regular rhythm, normal S1 and S2, no murmurs, rubs, clicks or gallops, distal pulses intact, no carotid bruits, no murmurs, no gallops, no carotid bruits and no JVD   Abdomen: obese, soft, non-tender, non-distended, normal bowel sounds, no masses or organomegaly   Extremities:no edema ,but she get Lasix already  Musculoskeletal: normal range of motion, no joint swelling, deformity or tenderness   Neurologic: reflexes normal and symmetric, no cranial nerve deficit noted    LABS/IMAGING:    CBC:  Lab Results   Component Value Date    WBC 7.8 05/20/2021    HGB 11.9 05/20/2021    HCT 40.0 05/20/2021    MCV 93.2 05/20/2021     (L) 05/20/2021    LYMPHOPCT 6.1 (L) 05/20/2021    RBC 4.29 05/20/2021 MCH 27.7 05/20/2021    MCHC 29.8 (L) 05/20/2021    RDW 13.1 05/20/2021    NEUTOPHILPCT 79.1 05/20/2021    MONOPCT 13.9 (H) 05/20/2021    BASOPCT 0.5 05/20/2021    NEUTROABS 6.16 05/20/2021    LYMPHSABS 0.47 (L) 05/20/2021    MONOSABS 1.09 (H) 05/20/2021    EOSABS 0.07 05/20/2021    BASOSABS 0.00 05/20/2021       Recent Labs     05/20/21  1146 05/19/21  1642 05/13/21  1000   WBC 7.8 11.7* 7.5   HGB 11.9 11.4* 13.4   HCT 40.0 37.3 44.2   MCV 93.2 92.8 92.7   * 154 226       BMP:   Recent Labs     05/19/21  1642 05/20/21  1146    139   K 3.4* 3.7   CL 95* 98   CO2 33* 33*   BUN 20 20   CREATININE 0.7 0.8       MG:   Lab Results   Component Value Date    MG 2.0 03/29/2021     Ca/Phos:   Lab Results   Component Value Date    CALCIUM 9.0 05/20/2021    PHOS 4.3 03/26/2021     Amylase: No results found for: AMYLASE  Lipase: No results found for: LIPASE  LIVER PROFILE:   No results for input(s): AST, ALT, LIPASE, BILIDIR, BILITOT, ALKPHOS in the last 72 hours. Invalid input(s): AMYLASE,  ALB    PT/INR: No results for input(s): PROTIME, INR in the last 72 hours. APTT: No results for input(s): APTT in the last 72 hours.     Cardiac Enzymes:  Lab Results   Component Value Date    TROPONINI <0.01 03/23/2021                 PROBLEM LIST:  Patient Active Problem List   Diagnosis    Cataract, nuclear    Senile osteoporosis    Closed fracture of distal end of radius    Left wrist pain    Post-traumatic osteoarthritis of both hips    DDD (degenerative disc disease), lumbar    History of compression fracture of spine    Decreased bone density    Thrombus- left lower lobe    Pleural effusion    COPD (chronic obstructive pulmonary disease) (HCC)    Critical aortic valve stenosis    Persistent atrial fibrillation (HCC)    Non-rheumatic mitral valve disease    Moderate to severe pulmonary hypertension (HCC)    Severe aortic stenosis    Pre-op testing               ASSESSMENT:  *- severe Aortic stenosis s/p TAVR   *. Pleural effusion   *- Acute hypoxic/hypercapnic respiratory failure  *-. CHF  *- h/o Pulmonary embolism   scoliosis bs spine deformity  A fib with RVR   Pleural effusion       PLAN:  Will look with US most likely  Will do thoracentesis left side   *-Agree with Diuresis ,She may need IV   *-wean O2 ,base line 2 L  *-BD   *_keep anticoagulation on Hold   *-BiPAP of in distress          Kirstin Reis MD,[facility]  P[facility]&Critical [facility] Medicine   Director of [address] [place] [address] Director of [address] [place] [address]    Quentin Minor    NOTE: This report was transcribed using voice recognition software. Every effort was made to ensure accuracy; however, inadvertent computerized transcription errors may be present.

## 2021-05-21 NOTE — PLAN OF CARE
Problem: Falls - Risk of:  Goal: Will remain free from falls  Description: Will remain free from falls  5/21/2021 1049 by Adelaida Perez RN  Outcome: Met This Shift     Problem: Breathing Pattern - Ineffective:  Goal: Ability to achieve and maintain a regular respiratory rate will improve  Description: Ability to achieve and maintain a regular respiratory rate will improve  Outcome: Met This Shift     Problem: Urinary Elimination:  Goal: Signs and symptoms of infection will decrease  Description: Signs and symptoms of infection will decrease  Outcome: Met This Shift

## 2021-05-21 NOTE — PROGRESS NOTES
Patient was off the floor for thoracentesis at the time of my visit. I will see her again tomorrow. Thank you for involving me in the care of [facility]. I will continue to follow. Please do not hesitate to call for any questions or concerns.     Electronically signed by Dyana Christianson MD on 5/21/2021 at 11:11 AM

## 2021-05-21 NOTE — PROCEDURES
Ultrasound guided Thoracentesis Procedure Note    PATIENT:     MEDICAL #     DATE:  5/21/ 2021    INDICTATIONS: pleural effusion found on imaging. PRE - OPERATIVE DIAGNOSIS:  Pleural Effusion    POST - OPERATIVE DIAGNOSIS:  Left  Pleural Effusion    PERFORMED By:  Reyna Gore MD    ASSISTANT(S):  US Technician     CONSCENT:  Verbal consent obtained. Written consent obtained. After informed consent & appropriate time out protocol was noted & obtained. Risks/Benefits/Alternatives of the procedure were discussed including: infection, bleeding, pain, & pneumothorax. THORACENTESIS PROCEDURE DETAILS:  Patient understanding: patient states understanding of the procedure being performed. Time out: Immediately prior to procedure a \"time out\" was called to verify the correct. Patient was placed in a sitting position and ultrasound was done and site of maximum fluid collection was marked. PREPARATION: Patient was prepped and draped in the usual sterile fashion. ANESTHESIA: Lidocaine 1% without epinephrine, amount varied for local control. PROCEDURE NOTE: The patient was placed in the sitting position. US was then used to hill the fluid and depth was determined. Then the skin was prepped with Chloraprep solution and draped with sterile covers. For the procedure we used 1% buffered lidocaine to anesthetize the skin, subcutaneous tissue and parietal pleura. After adequate anesthesia was accomplished. The plural catheter was advanced over a guide into the pleural space. The fluid was obtained without any difficulties and minimal blood loss. FINDINGS/SAMPLES: We removed 700 ml of serosanguinous clear pleural fluid. The samples was not sent for analysis. COMPLICATIONS:  None; patient tolerated the procedure well. PLAN: Care will be taken to review the post procedure radiograph for pneumothorax & testing when available.     Reyna Gore

## 2021-05-21 NOTE — PROGRESS NOTES
TAVR TEAM PROGRESS NOTE      POD # 1 s/p TAVR with 26-mm Evolut PRO+; Anthony Thomas/Kavita    TAVR access: right common femoral artery percutaneous approach  Pigtail access: left common femoral artery    Subjective:    Sitting up in chair; denies complaints but wishes to get back in bed, stating she needs to rest   Remains on 3L oxygen NC with sats 92-99% last 24 hours   Possible thoracentesis by pulmonary later today   Vitals stable, labs stable      Current Facility-Administered Medications   Medication Dose Route Frequency Provider Last Rate Last Admin    acetaminophen (TYLENOL) tablet 650 mg  650 mg Oral 3 times per day DENNIS Otto   650 mg at 05/21/21 0557    albuterol (PROVENTIL) nebulizer solution 2.5 mg  2.5 mg Nebulization Q6H PRN DENNIS Otto        [Held by provider] apixaban (ELIQUIS) tablet 2.5 mg  2.5 mg Oral BID DENNIS Otto   2.5 mg at 05/20/21 8620    calcium carbonate (TUMS) chewable tablet 500 mg  500 mg Oral BID DENNIS Otto   500 mg at 05/20/21 1948    digoxin (LANOXIN) tablet 125 mcg  125 mcg Oral Daily DENNIS Otto   125 mcg at 05/20/21 1946    furosemide (LASIX) tablet 40 mg  40 mg Oral BID DENNIS Otto   40 mg at 05/21/21 7421    isosorbide mononitrate (IMDUR) extended release tablet 30 mg  30 mg Oral Daily DENNIS Otto   30 mg at 05/20/21 1946    leflunomide (ARAVA) tablet 20 mg  20 mg Oral Daily Deyanira Otto        cetirizine (ZYRTEC) tablet 10 mg  10 mg Oral Daily DENNIS Otto   10 mg at 05/20/21 1946    melatonin tablet 3 mg  3 mg Oral Nightly PRN DENNIS Otto        metoprolol tartrate (LOPRESSOR) tablet 25 mg  25 mg Oral BID DENNIS Otto   25 mg at 05/21/21 1082    ocuvite-lutein multivitamin 1 capsule  1 capsule Oral Daily DENNIS Otto   1 capsule at 05/20/21 1947    polyethylene glycol (GLYCOLAX) packet 17 g  17 g Oral Daily DENNIS Otto   17 g at 05/20/21 1948    predniSONE (DELTASONE) tablet 5 mg  5 mg Oral Daily DENNIS Viera   5 mg at 05/20/21 1947    senna (SENOKOT) tablet 8.6 mg  1 tablet Oral Nightly DENNIS Viera   8.6 mg at 05/20/21 1946    spironolactone (ALDACTONE) tablet 25 mg  25 mg Oral Daily DENNIS Viera   25 mg at 05/20/21 1947    therapeutic multivitamin-minerals 1 tablet  1 tablet Oral Daily DENNIS Viera   1 tablet at 05/20/21 1947    vitamin D (CHOLECALCIFEROL) tablet 2,000 Units  2,000 Units Oral Daily DENNIS Viera   2,000 Units at 05/20/21 1948    sodium chloride flush 0.9 % injection 5-40 mL  5-40 mL Intravenous 2 times per day DENNIS Viera   10 mL at 05/21/21 3616    sodium chloride flush 0.9 % injection 5-40 mL  5-40 mL Intravenous PRN DENNIS Viera   10 mL at 05/21/21 0557    0.9 % sodium chloride infusion  25 mL Intravenous PRN DENNIS Viera        ondansetron [facility]) injection 4 mg  4 mg Intravenous Q8H PRN DENNIS Viera        aspirin EC tablet 81 mg  81 mg Oral Daily DENNIS Viera   81 mg at 05/20/21 1946    oxyCODONE-acetaminophen (PERCOCET) 5-325 MG per tablet 1 tablet  1 tablet Oral Q4H PRN DENNIS Viera        glucose (GLUTOSE) 40 % oral gel 15 g  15 g Oral PRN DENNIS Viera        dextrose 50 % IV solution  12.5 g Intravenous PRN DENNIS Viera        glucagon (rDNA) injection 1 mg  1 mg Intramuscular PRN DENNIS Viera        dextrose 5 % solution  100 mL/hr Intravenous PRN DENNIS Viera        perflutren lipid microspheres (DEFINITY) injection 1.65 mg  1.5 mL Intravenous ONCE PRN DENNIS Viera        budesonide (PULMICORT) nebulizer suspension 250 mcg  0.25 mg Nebulization BID DENNIS Viera   250 mcg at 05/21/21 0813    And    Arformoterol Tartrate (BROVANA) nebulizer solution 15 mcg  15 mcg Nebulization BID DENNIS Viera   15 mcg at 05/21/21 0878           Intake/Output Summary (Last 24 hours) at 5/21/2021 1100  Last data filed at 5/21/2021 0846  Gross per 24 hour   Intake 480 ml   Output 425 ml   Net 55 ml       Patient Vitals for the past 96 hrs (Last 3 readings):   Weight   05/19/21 1033 134 lb (60.8 kg)          Physical Exam:  /62   Pulse 118   Temp 99 °F (37.2 °C) (Temporal)   Resp 24   Ht 5' 1\" (1.549 m)   Wt 134 lb (60.8 kg)   SpO2 95%   BMI 25.32 kg/m²     General: No acute distress, appears her stated age, nonicteric  Head: Atraumatic, no gross abnormalities or bruises  Neck: Supple and nontender, no carotid bruits, no JVP  Lungs: diminished breath sounds to auscultation bilaterally, no wheezes, rales, or rhonchi  Heart: irregularly irregular rate and rhythm, no murmurs, rubs, or gallops  Abdomen: Soft, nontender, nondistended, normal bowel sounds  Extremities: No obvious deformities, no cyanosis, no edema  Neurological: Alert and oriented x3, EOMI, moving all extremities x4  Psychological: Normal mood and affect, cooperative  Skin: Color, texture, and turgor normal for age     Access sites: minimal ecchymoses. No hematoma or pulsatile masses. Distal pulses normal b/l. Lab Review     Recent Labs     05/19/21  1642 05/20/21  1146 05/21/21  0721   WBC 11.7* 7.8 9.4   HGB 11.4* 11.9 11.2*   HCT 37.3 40.0 37.0    122* 98*       Recent Labs     05/19/21  1642 05/20/21  1146 05/21/21  0721    139 137   K 3.4* 3.7 3.5   CL 95* 98 94*   CO2 33* 33* 35*   BUN 20 20 19   CREATININE 0.7 0.8 0.7       No results for input(s): AST, ALT, ALB, BILIDIR, ALKPHOS in the last 72 hours. No results for input(s): BNP, CKTOTAL, CKMB in the last 72 hours. No results for input(s): BNP, INR in the last 72 hours. EKG pre-TAVR: atrial fibrillation, QRS 96    EKG today: atrial fibrillation, ,     POD1 TTE:       CXR 5/20/21: moderate bilateral pleural effusions, worse c/t 5/13/21. Left mid and lower lung opacity possible compressive atelectasis. Pneumonia cannot be excluded. Images personally reviewed      Assessment:  1. Critical aortic stenosis s/p TAVR with 26 mm Evolut PRO+  2. HFpEF   3. Atrial fibrillation - on apixaban  4. HTN - controlled  5. CKD - stable  6. Severe COPD - home oxygen 3L  7. H/o DVT/PE  8. Severe kyphosis  9. Chronic immunosuppression  10. Mod-severe pHTN  11. Bilateral pleural effusions noted on CXR as above    NYHA class IIIb    Plan:  1. Continue current medications  2. Hold apixaban  3. Appreciate pulmonary input - for possible thoracentesis today  4. Antithrombotic therapy: aspirin plus apixaban 2.5  5. Access site check in 2 weeks  6. Valve Clinic follow-up in 4 weeks  7. Endocarditis prophylaxis indefinitely before high-risk procedures  8. Inpatient followed by outpatient cardiac rehab  9. Anticipate discharge next 24-48 hours    Discussed with Dr. Tanja Chavez, PA-C    PHYSICIAN ADDENDUM:  I independently interviewed and examined the patient. I have reviewed the above documentation completed by the JOSEPHINE. Please see my additional contributions to the HPI, physical exam, and assessment / medical decision making. I independently reviewed the HPI, ROS, PMH, PSH, [address], SH, and medications independently and agree with above documentation.     I discussed the assessment and plan with the patient/family at the bedside as well as the bedside nurse and the primary team.    Robert Hanson MD, [address] [facility]  Interventional Cardiology/Structural Heart Disease  Office: [phone]

## 2021-05-22 NOTE — DISCHARGE INSTR - COC
Continuity of Care Form    Patient Name: Lisseth Roldan   :  1932  MRN:  [id number]    [facility] date:  2021  Discharge date:  21    Code Status Order: Full Code   Advance Directives:   Advance Care Flowsheet Documentation     Date/Time Healthcare Directive Type of Healthcare Directive Copy in [address] Agent's Name Healthcare Agent's Phone Number    21 1039  Yes, patient has an advance directive for healthcare treatment --  Yes, copy in chart -- -- --          Admitting Physician:  Ruth Hancock MD  PCP: Manjit Brothers MD    Discharging Nurse: [facility] Unit/Room#: 1231/9225-J  Discharging Unit Phone Number: [phone]    Emergency Contact:   Extended Emergency Contact Information  Primary Emergency Contact: [address] Phone: [phone]  Relation: Other  Secondary Emergency Contact: ANA/ Ata Sloan 33, [address] Phone: [phone]  Relation: Other    Past Surgical History:  Past Surgical History:   Procedure Laterality Date    AORTIC VALVE REPLACEMENT Right 2021    TRANSCATHETER AORTIC VALVE REPLACEMENT RIGHT FEMORAL APPROACH performed by Ruth Hancock MD at [facility] Right 7 8 13    CATARACT REMOVAL WITH IMPLANT Left 13    CHOLECYSTECTOMY      COLONOSCOPY      DILATION AND CURETTAGE OF UTERUS      hysteroscopy    ENDOMETRIAL BIOPSY     [address]  Wrist    KYPHOSIS SURGERY      LOBECTOMY  rt upper    OTHER SURGICAL HISTORY Left 9-15-15    ORIF left wrist    TRANSESOPHAGEAL ECHOCARDIOGRAM N/A 2021    TRANSESOPHAGEAL ECHOCARDIOGRAM performed by Ruth Hancock MD at [address] [place] [address]      endovenous laser for leaking    VERTEBROPLASTY         Immunization History:   Immunization History   Administered Date(s) Administered    COVID-19, Walden Peter, PF, 30mcg/0.3mL 2021, 2021       Active Problems:  Patient Active Problem List   Diagnosis Code    Cataract, nuclear [id number]    Senile osteoporosis M81.0    Closed fracture of distal end of radius S52.509A    Left wrist pain M25.532    Post-traumatic osteoarthritis of both hips M16.4    DDD (degenerative disc disease), lumbar M51.36    History of compression fracture of spine Z87.81    Decreased bone density M85.80    Thrombus- left lower lobe I82.90    Pleural effusion J90    COPD (chronic obstructive pulmonary disease) [facility]) J44.9    Critical aortic valve stenosis I35.0    Persistent atrial fibrillation (HCC) I48.19    Non-rheumatic mitral valve disease I34.9    Moderate to severe pulmonary hypertension ([facility]) I27.20    Severe aortic stenosis I35.0    Pre-op testing Z01.818       Isolation/Infection:   Isolation          No Isolation        Patient Infection Status     Infection Onset Added Last Indicated Last Indicated By Review Planned Expiration Resolved Resolved By    None active    Resolved    COVID-19 Rule Out 03/23/21 03/23/21 03/24/21 Respiratory Panel, Molecular, with COVID-19 (Restricted: peds pts or suitable admitted adults) (Ordered)   03/24/21 Rule-Out Test Resulted    COVID-19 Rule Out 03/23/21 03/23/21 03/23/21 COVID-19, Rapid (Ordered)   03/23/21 Rule-Out Test Resulted          Nurse Assessment:  Last Vital Signs: BP (!) 123/56   Pulse 77   Temp 97.7 °F (36.5 °C) (Temporal)   Resp 18   Ht 5' 1\" (1.549 m)   Wt 134 lb (60.8 kg)   SpO2 98%   BMI 25.32 kg/m²     Last documented pain score (0-10 scale): Pain Level: 5  Last Weight:   Wt Readings from Last 1 Encounters:   05/19/21 134 lb (60.8 kg)     Mental Status:  oriented    IV Access:  - None    Nursing Mobility/ADLs:  Walking   Assisted  Transfer  Assisted  Bathing  Assisted  Dressing  Assisted  Toileting  Assisted  Feeding  Assisted  Med Admin  Independent  Med Delivery   whole    Wound Care Documentation and Therapy:  Puncture 05/19/21 Femoral Anterior;Right (Active)   Dressing Changed Changed/New 05/19/21 1507   Dressing Status Clean; Intact;Dry 05/19/21 1507   Number of days: 3       Puncture 05/19/21 Femoral Anterior; Left (Active)   Dressing Changed Changed/New 05/19/21 1518   Dressing Status Clean;Dry; Intact 05/19/21 1518   Number of days: 3        Elimination:  Continence:   · Bowel: Yes  · Bladder: Yes  Urinary Catheter: None   Colostomy/Ileostomy/Ileal Conduit: No       Date of Last BM: ***    Intake/Output Summary (Last 24 hours) at 5/22/2021 1720  Last data filed at 5/22/2021 1405  Gross per 24 hour   Intake 1440 ml   Output 1000 ml   Net 440 ml     I/O last 3 completed shifts: In: 1440 [P.O.:1440]  Out: 1000 [Urine:1000]    Safety Concerns: At Risk for Falls    Impairments/Disabilities:      Vision    Nutrition Therapy:  Current Nutrition Therapy:   - Oral Diet:  Cardiac    Routes of Feeding: Oral  Liquids: No Restrictions  Daily Fluid Restriction: no  Last Modified Barium Swallow with Video (Video Swallowing Test): not done    Treatments at the Time of Hospital Discharge:   Respiratory Treatments: 05/22/21  Oxygen Therapy:  is on oxygen at 2 L/min per nasal cannula.   Ventilator: none       Rehab Therapies: ***  Weight Bearing Status/Restrictions: No weight bearing restirctions  Other Medical Equipment (for information only, NOT a DME order):  walker  Other Treatments:     Patient's personal belongings (please select all that are sent with patient):  Damien WOLF SIGNATURE:  Electronically signed by Analisa Valdez on 5/22/21 at 5:24 PM EDT    CASE MANAGEMENT/SOCIAL WORK SECTION    Inpatient Status Date: ***    Readmission Risk Assessment Score:  Readmission Risk              Risk of Unplanned Readmission:  17           Discharging to Facility/ Agency   · Name:   · Address:  · Phone:  · Fax:    Dialysis Facility (if applicable)   · Name:  · Address:  · Dialysis Schedule:  · Phone:  · Fax:    / signature: {Esignature:[id number]}    PHYSICIAN SECTION    Prognosis: {Prognosis:0239509717}    Condition at Discharge: Asa Tapia Patient Condition:954923149}    Rehab Potential (if transferring to Rehab): {Prognosis:5992075092}    Recommended Labs or Other Treatments After Discharge: ***    Physician Certification: I certify the above information and transfer of Yelitza Houston  is necessary for the continuing treatment of the diagnosis listed and that she requires {Admit to Appropriate Level of Care:95957} for {GREATER/LESS:070177215} 30 days.      Update Admission H&P: {CHP DME Changes in JSXFX:856244346}    PHYSICIAN SIGNATURE:  {Esignature:849604970}

## 2021-05-22 NOTE — PROGRESS NOTES
Pulmonary [facility]                             Pulmonary Consult/Progress Note :            Reason for Consultation:Acute hypoxic respiratory failure   Pleural effusion   CC : SOB   HPI:   Did very well post thoracentesis  800 ml was drained   Feel fatigue in general     PHYSICAL EXAMINATION:     VITAL SIGNS:  BP (!) 118/57   Pulse 92   Temp 97.8 °F (36.6 °C) (Temporal)   Resp 16   Ht 5' 1\" (1.549 m)   Wt 134 lb (60.8 kg)   SpO2 96%   BMI 25.32 kg/m²   Wt Readings from Last 3 Encounters:   05/19/21 134 lb (60.8 kg)   05/13/21 134 lb (60.8 kg)   05/04/21 134 lb 9.6 oz (61.1 kg)     Temp Readings from Last 3 Encounters:   05/21/21 97.8 °F (36.6 °C) (Temporal)   05/13/21 97.5 °F (36.4 °C) (Temporal)   03/29/21 96.8 °F (36 °C) (Temporal)     TMAX:  BP Readings from Last 3 Encounters:   05/21/21 (!) 118/57   05/13/21 106/74   05/04/21 122/76     Pulse Readings from Last 3 Encounters:   05/21/21 92   05/13/21 96   05/04/21 111           INTAKE/OUTPUTS:  I/O last 3 completed shifts:   In: 600 [P.O.:600]  Out: 425 [Urine:425]    Intake/Output Summary (Last 24 hours) at 5/21/2021 4728  Last data filed at 5/21/2021 1837  Gross per 24 hour   Intake 480 ml   Output 200 ml   Net 280 ml       General Appearance: alert and oriented to person, place and time, well-developed and   well-nourished, in no acute distress   Eyes: pupils equal, round, and reactive to light, extraocular eye movements intact, conjunctivae normal and sclera anicteric   Neck: neck supple and non tender without mass, no thyromegaly, no thyroid nodules and no cervical adenopathy   Pulmonary/Chest:rhonchi bilateral   Cardiovascular: normal rate, regular rhythm, normal S1 and S2, no murmurs, rubs, clicks or gallops, distal pulses intact, no carotid bruits, no murmurs, no gallops, no carotid bruits and no JVD   Abdomen: obese, soft, non-tender, non-distended, normal bowel sounds, no masses or organomegaly Extremities:no edema ,but she get Lasix already  Musculoskeletal: normal range of motion, no joint swelling, deformity or tenderness   Neurologic: reflexes normal and symmetric, no cranial nerve deficit noted    LABS/IMAGING:    CBC:  Lab Results   Component Value Date    WBC 9.4 05/21/2021    HGB 11.2 (L) 05/21/2021    HCT 37.0 05/21/2021    MCV 92.3 05/21/2021    PLT 98 (L) 05/21/2021    LYMPHOPCT 6.1 (L) 05/20/2021    RBC 4.01 05/21/2021    MCH 27.9 05/21/2021    MCHC 30.3 (L) 05/21/2021    RDW 12.9 05/21/2021    NEUTOPHILPCT 79.1 05/20/2021    MONOPCT 13.9 (H) 05/20/2021    BASOPCT 0.5 05/20/2021    NEUTROABS 6.16 05/20/2021    LYMPHSABS 0.47 (L) 05/20/2021    MONOSABS 1.09 (H) 05/20/2021    EOSABS 0.07 05/20/2021    BASOSABS 0.00 05/20/2021       Recent Labs     05/21/21  0721 05/20/21  1146 05/19/21  1642   WBC 9.4 7.8 11.7*   HGB 11.2* 11.9 11.4*   HCT 37.0 40.0 37.3   MCV 92.3 93.2 92.8   PLT 98* 122* 154       BMP:   Recent Labs     05/19/21  1642 05/20/21  1146 05/21/21  0721    139 137   K 3.4* 3.7 3.5   CL 95* 98 94*   CO2 33* 33* 35*   BUN 20 20 19   CREATININE 0.7 0.8 0.7       MG:   Lab Results   Component Value Date    MG 2.0 03/29/2021     Ca/Phos:   Lab Results   Component Value Date    CALCIUM 9.1 05/21/2021    PHOS 4.3 03/26/2021     Amylase: No results found for: AMYLASE  Lipase: No results found for: LIPASE  LIVER PROFILE:   No results for input(s): AST, ALT, LIPASE, BILIDIR, BILITOT, ALKPHOS in the last 72 hours. Invalid input(s): AMYLASE,  ALB    PT/INR: No results for input(s): PROTIME, INR in the last 72 hours. APTT: No results for input(s): APTT in the last 72 hours.     Cardiac Enzymes:  Lab Results   Component Value Date    TROPONINI <0.01 03/23/2021                 PROBLEM LIST:  Patient Active Problem List   Diagnosis    Cataract, nuclear    Senile osteoporosis    Closed fracture of distal end of radius    Left wrist pain    Post-traumatic osteoarthritis of both hips  DDD (degenerative disc disease), lumbar    History of compression fracture of spine    Decreased bone density    Thrombus- left lower lobe    Pleural effusion    COPD (chronic obstructive pulmonary disease) (HCC)    Critical aortic valve stenosis    Persistent atrial fibrillation (HCC)    Non-rheumatic mitral valve disease    Moderate to severe pulmonary hypertension (HCC)    Severe aortic stenosis    Pre-op testing               ASSESSMENT:  *- severe Aortic stenosis s/p TAVR   *. Pleural effusion   *- Acute hypoxic/hypercapnic respiratory failure  *-. CHF  *- h/o Pulmonary embolism   scoliosis bs spine deformity  A fib with RVR   Pleural effusion       PLAN:  S/P  US  thoracentesis left side ,700-800 drained ,cxr better  *-Agree with Diuresis ,She may need IV   *-wean O2 ,base line 2 L  *-BD   *can resume anticoagulation from pulm stand point when ok with others  *-BiPAP of in distress          Kirstin Reis MD,[facility]  Pulmonary&Critical Care Medicine   Director of [address] [place] [address] Director of [address] [place] [address]    Timbo Michelle    NOTE: This report was transcribed using voice recognition software. Every effort was made to ensure accuracy; however, inadvertent computerized transcription errors may be present.

## 2021-05-22 NOTE — CARE COORDINATION
Notified of need for transport back to [facility]. Called [facility] and arranged transport for 8:30p via ambulance. Notified RN caring for patient.   Faxed ambulance form and facesheet to [facility]

## 2021-05-22 NOTE — PROGRESS NOTES
cetirizine (ZYRTEC) tablet 10 mg  10 mg Oral Daily Alex Dilling, PA   10 mg at 05/21/21 2133    melatonin tablet 3 mg  3 mg Oral Nightly PRN Alex Dilling, PA        metoprolol tartrate (LOPRESSOR) tablet 25 mg  25 mg Oral BID Alex Dilling, PA   25 mg at 05/22/21 0910    ocuvite-lutein multivitamin 1 capsule  1 capsule Oral Daily Alex Dilling, PA   1 capsule at 05/21/21 2131    polyethylene glycol (GLYCOLAX) packet 17 g  17 g Oral Daily Alex Dilling, PA   17 g at 05/20/21 1948    predniSONE (DELTASONE) tablet 5 mg  5 mg Oral Daily Alex Dilling, PA   5 mg at 05/21/21 2135    senna (SENOKOT) tablet 8.6 mg  1 tablet Oral Nightly Alex Dilling, PA   8.6 mg at 05/21/21 2131    spironolactone (ALDACTONE) tablet 25 mg  25 mg Oral Daily Alex Dilling, PA   25 mg at 05/21/21 2133    therapeutic multivitamin-minerals 1 tablet  1 tablet Oral Daily Alex Dilling, PA   1 tablet at 05/21/21 2134    vitamin D (CHOLECALCIFEROL) tablet 2,000 Units  2,000 Units Oral Daily Alex Dilling, PA   2,000 Units at 05/21/21 2131    sodium chloride flush 0.9 % injection 5-40 mL  5-40 mL Intravenous 2 times per day Alex Dilling, PA   10 mL at 05/22/21 0856    sodium chloride flush 0.9 % injection 5-40 mL  5-40 mL Intravenous PRN Alex Dilling, PA   10 mL at 05/21/21 0557    0.9 % sodium chloride infusion  25 mL Intravenous PRN Alex Dilling, PA        ondansetron [facility]) injection 4 mg  4 mg Intravenous Q8H PRN Alex Dilling, PA        aspirin EC tablet 81 mg  81 mg Oral Daily Alex Dilling, PA   81 mg at 05/21/21 2131    oxyCODONE-acetaminophen (PERCOCET) 5-325 MG per tablet 1 tablet  1 tablet Oral Q4H PRN Alex Dilling, PA        glucose (GLUTOSE) 40 % oral gel 15 g  15 g Oral PRN Alex Dilling, PA        dextrose 50 % IV solution  12.5 g Intravenous PRN DENNIS Nice        glucagon (rDNA) injection 1 mg  1 mg Intramuscular PRN DENNIS Nice        dextrose 5 % solution  100 mL/hr Intravenous PRN DENNIS Mello        perflutren lipid microspheres (DEFINITY) injection 1.65 mg  1.5 mL Intravenous ONCE PRN DENNIS Mello        budesonide (PULMICORT) nebulizer suspension 250 mcg  0.25 mg Nebulization BID Yarely DENNIS Maurer   250 mcg at 05/21/21 1916    And    Arformoterol Tartrate (BROVANA) nebulizer solution 15 mcg  15 mcg Nebulization BID Yarely Libertad PA   15 mcg at 05/21/21 1916       Review of systems:   Heart: as above   Lungs: as above   Eyes: denies changes in vision or discharge. Ears: denies changes in hearing or pain. Nose: denies epistaxis or masses   Throat: denies sore throat or trouble swallowing. Neuro: denies numbness, tingling, tremors. Skin: denies rashes or itching. : denies hematuria, dysuria   GI: denies vomiting, diarrhea   Psych: denies mood changed, anxiety, depression. Physical Exam   /69   Pulse 72   Temp 97.6 °F (36.4 °C) (Temporal)   Resp 18   Ht 5' 1\" (1.549 m)   Wt 134 lb (60.8 kg)   SpO2 98%   BMI 25.32 kg/m²   Constitutional: Oriented to person, place, and time. No distress. Well developed. Head: Normocephalic and atraumatic. Neck: Neck supple. No hepatojugular reflux. No JVD present. Carotid bruit is not present. No tracheal deviation present. No thyromegaly present. Cardiovascular: Normal rate, regular rhythm, normal heart sounds. intact distal pulses. No gallop and no friction rub. No murmur heard. Pulmonary: Breath sounds normal. No respiratory distress. No wheezes. No rales. Chest: Effort normal. No tenderness. Abdominal: Soft. Bowel sounds are normal. No distension or mass. No tenderness, rebound or guarding. Musculoskeletal: . No tenderness. No clubbing or cyanosis. Extremitites: Intact distal pulses. No edema  Neurological: Alert and oriented to person, place, and time. Skin: Skin is warm and dry. No rash noted. Not diaphoretic. No erythema. Psychiatric: Normal mood and affect. Behavior is normal.   Lymphadenopathy: No cervical adenopathy. No groin adenopathy. CBC:   Lab Results   Component Value Date    WBC 7.6 05/22/2021    RBC 3.97 05/22/2021    HGB 11.1 05/22/2021    HCT 36.8 05/22/2021    MCV 92.7 05/22/2021    MCH 28.0 05/22/2021    MCHC 30.2 05/22/2021    RDW 13.0 05/22/2021    PLT 91 05/22/2021    MPV 11.8 05/22/2021     BMP:   Lab Results   Component Value Date     05/22/2021    K 4.1 05/22/2021    K 3.3 03/29/2021    CL 95 05/22/2021    CO2 38 05/22/2021    BUN 16 05/22/2021    LABALBU 3.7 05/13/2021    CREATININE 0.6 05/22/2021    CALCIUM 9.2 05/22/2021    GFRAA >60 05/22/2021    LABGLOM >60 05/22/2021     Magnesium:    Lab Results   Component Value Date    MG 2.0 03/29/2021     Cardiac Enzymes:   Lab Results   Component Value Date    TROPONINI <0.01 03/23/2021      PT/INR:    Lab Results   Component Value Date    PROTIME 11.6 05/13/2021    INR 1.1 05/13/2021     TSH:    Lab Results   Component Value Date    TSH 1.420 03/23/2021     ASSESSMENT & PLAN:    Patient Active Problem List   Diagnosis    Cataract, nuclear    Senile osteoporosis    Closed fracture of distal end of radius    Left wrist pain    Post-traumatic osteoarthritis of both hips    DDD (degenerative disc disease), lumbar    History of compression fracture of spine    Decreased bone density    Thrombus- left lower lobe    Pleural effusion    COPD (chronic obstructive pulmonary disease) (HCC)    Critical aortic valve stenosis    Persistent atrial fibrillation (HCC)    Non-rheumatic mitral valve disease    Moderate to severe pulmonary hypertension (HCC)    Severe aortic stenosis    Pre-op testing     1. AS/Post TAVR:  S/p TAVR with 26 mm Evolut PRO+. 2. HFpEF: Observe volume. 3. Atrial fibrillation: Eliquis. 4. HTN: Observe. 5. CKD: Follow labs. 6. Severe COPD: Home oxygen 3L    7. H/o DVT/PE: Eliquis. 8. Severe kyphosis    9. Chronic immunosuppression    10. Mod-severe pHTN: Medically manage. 11. Bilateral pleural effusions: Post thoracentesis. Move to discharge. Romana Schwalbe, D.O.   Cardiologist  Cardiology, [facility]

## 2021-05-22 NOTE — CONSULTS
Met with patient and discussed our outpatient Phase II Cardiac Rehabilitation program. Reviewed the benefits of cardiac rehabilitation based on their diagnosis and personal risk factors. Patient demonstrates moderate interest in Cardiac Rehabilitation at this time. Cardiac Rehabilitation brochure provided to patient/family. The patient may call [facility] at [phone] for additional information or questions. Contact information for [facility] and other choices close to the patient's residence have been provided in the discharge instructions so that the patient may call and schedule an appointment when cleared by their physician.

## 2021-05-22 NOTE — PROGRESS NOTES
BRIAN PROGRESS NOTE      Chief complaint: Follow-up of asymptomatic bacteriuria    The patient is a 80 y.o. female with history of COPD, atrial fibrillation, pulmonary embolism, hypertension, critical aortic stenosis, admitted on 05/19 for elective TAVR. She reports no fever, no chills, no abdominal pain, no diarrhea, no dysuria, no hematuria. She had perioperative work-up including urinalysis on 05/13 showing pyuria with packed WBCs and corresponding urine culture showing E. coli (non-ESBL, resistant to ampicillin, piperacillin-tazobactam, co-trimoxazole). On admission, she was afebrile and hemodynamically stable with no leukocytosis. She received a dose of gentamicin on admission. Cefazolin was started on admission for perioperative prophylaxis. Subjective: Patient was seen and examined. No chills, no abdominal pain, no diarrhea, no rash, no itching, no dysuria. Objective:    Vitals:    05/22/21 0846   BP: 122/60   Pulse: 88   Resp: 18   Temp: 97.3 °F (36.3 °C)   SpO2: 94%     Constitutional: Alert, not in distress  Respiratory: Clear breath sounds, no crackles, no wheezes  Cardiovascular: Regular rate and rhythm, no murmurs  Gastrointestinal: Bowel sounds present, soft, nontender  Skin: Warm and dry, no active dermatoses  Musculoskeletal: No joint swelling, no joint erythema    Labs, imaging, and medical records/notes were personally reviewed. Assessment:  Asymptomatic bacteriuria  Critical aortic stenosis, s/p TAVR on 05/19    Recommendations:  Monitor clinically off antibiotics. Continue supportive care. Thank you for involving me in the care of [facility]. I will sign off for now. Please do not hesitate to call for any questions, concerns, or new findings.     Electronically signed by Kody Rojo MD on 5/22/2021 at 10:58 AM

## 2021-05-24 NOTE — DISCHARGE SUMMARY
Patient ID:  Vu Jones  [id number]  80 y.o.  12/4/1932    Admit date: 5/19/2021    Discharge date and time: 5/22/2021  9:30 PM     Admitting Physician: Estelle Hobbs MD     Discharge Physician: Malik Conte DO    Admission Diagnoses: Pre-op testing [Z01.818]  Severe aortic stenosis [I35.0]    Discharge Diagnoses:   1. Severe symptomatic AS. 2. Successful transcutaneous aortic valve replacement (TAVR) with 26mm Medtronic Evolut PRO+ valve. Admission Condition: fair    Discharged Condition: good    Indication for Admission: 80 y.o. admitted post TAVR for severe symptomatic AS. Hospital Course: 80 y.o.  female admitted after elective TAVR procedure for critical AS. TAVR was performed with a 26mm Medtronic Evolut PRO+ valve on 5/19/2021 using b/l groin approach. TTE post procedure: MG 7, DVI 0.63, EOA 1.9, no PVL. She was observed in CVICU for few hours and was transferred to CIU on POD # 0. She complained of dyspnea and was found to have bilateral pleural effusions. She was evaluated by pulmonary and underwent left sided thoracentesis for 800 cc on POD #2. B/l groin sites were stable and distal pulses were normal. She otherwise had an uneventful post procedure course and was discharged on POD # 3. Discharge Exam:  Unchanged. Disposition: [facility]    Patient Instructions: Activity: as per discharge instructions  Diet: regular diet  Wound Care: as directed    Follow Up:   1. Structural heart clinic in 2 weeks for access site check.         Signed:  Nader Arguello PA-C  5/24/2021  10:27 AM

## 2021-05-28 NOTE — TELEPHONE ENCOUNTER
Reduce her metoprolol to 25 mg 1/2 pill BID. Jillian Tenorio D.O.   Cardiologist  Cardiology, [facility]

## 2021-06-01 NOTE — TELEPHONE ENCOUNTER
----- Message from Santos Pham DO sent at 5/29/2021 10:19 AM EDT -----  BMP showed K and Cr okay but bicarb up. I reduced lasix to once a day over phone. Dig level was okay.

## 2021-06-08 NOTE — PROGRESS NOTES
Structural Heart Clinic Note      Patient name: Vu Jones    Reason for visit: TAVR follow up     Primary Care Physician: Nano Lafleur MD    Date of service: 6/8/2021    Chief Complaint: TAVR follow up - incision check    HPI: Maribel Sanchez presents for follow up s/p TAVR on 5/19/2021. She is doing well overall. She notes improvement in her dyspnea, although not as much as she had hoped. She denies chest pain, orthopnea, PND, LE edema, palpitations or syncope. She remains on 3L oxygen via nasal canula, which was her home dose prior to TAVR. She has not resumed physical therapy since hospital discharge, but has attempted some walking on her own. She feels weak and deconditioned. Allergies:    Allergies   Allergen Reactions    Cardizem [Diltiazem Hcl] Rash    Nsaids Other (See Comments)     Kidney insufficiency    Tape [Adhesive Tape] Rash       Home medications:    Current Outpatient Medications   Medication Sig Dispense Refill    leflunomide (ARAVA) 20 MG tablet TAKE 1 TABLET BY MOUTH DAILY 30 tablet 3    acetaminophen (MAPAP ARTHRITIS PAIN) 650 MG extended release tablet TAKE 2 TABLETS BY MOUTH EVERY EIGHT HOURS. 180 tablet 3    furosemide (LASIX) 40 MG tablet Take 1 tablet by mouth daily 30 tablet 5    apixaban (ELIQUIS) 2.5 MG TABS tablet Take 1 tablet by mouth 2 times daily 60 tablet 3    metoprolol tartrate (LOPRESSOR) 25 MG tablet Take 0.5 tablets by mouth 2 times daily 30 tablet 5    aspirin 81 MG EC tablet Take 1 tablet by mouth daily 30 tablet 3    melatonin 1 MG tablet Take 1 mg by mouth nightly as needed for Sleep      digoxin (LANOXIN) 125 MCG tablet Take 1 tablet by mouth daily 30 tablet 5    senna (SENOKOT) 8.6 MG tablet One at bedtime 30 tablet 11    isosorbide mononitrate (IMDUR) 30 MG extended release tablet TAKE 1 TABLET BY MOUTH DAILY 30 tablet 11    spironolactone (ALDACTONE) 25 MG tablet Take 1 tablet by mouth daily 30 tablet 3    fluticasone-vilanterol (BREO FRACTURE SURGERY      FRACTURE SURGERY  1995    Rt. Wrist    KYPHOSIS SURGERY      LOBECTOMY  rt upper    OTHER SURGICAL HISTORY Left 9-15-15    ORIF left wrist    TRANSESOPHAGEAL ECHOCARDIOGRAM N/A 5/19/2021    TRANSESOPHAGEAL ECHOCARDIOGRAM performed by Laura Neri MD at [address] [facility]  2014    endovenous laser for leaking    VERTEBROPLASTY         Social History:  Social History     Socioeconomic History    Marital status: Single     Spouse name: Not on file    Number of children: Not on file    Years of education: Not on file    Highest education level: Not on file   Occupational History    Not on file   Tobacco Use    Smoking status: Never Smoker    Smokeless tobacco: Never Used   Substance and Sexual Activity    Alcohol use: No    Drug use: No    Sexual activity: Not on file   Other Topics Concern    Not on file   Social History Narrative    Not on file     Social Determinants of Health     Financial Resource Strain:     Difficulty of Paying Living Expenses:    Food Insecurity:     Worried About Running Out of Food in the Last Year:     Mra of Food in the Last Year:    Transportation Needs:     Lack of Transportation (Medical):      Lack of Transportation (Non-Medical):    Physical Activity:     Days of Exercise per Week:     Minutes of Exercise per Session:    Stress:     Feeling of Stress :    Social Connections:     Frequency of Communication with Friends and Family:     Frequency of Social Gatherings with Friends and Family:     Attends Taoist Services:     Active Member of Clubs or Organizations:     Attends Club or Organization Meetings:     Marital Status:    Intimate Partner Violence:     Fear of Current or Ex-Partner:     Emotionally Abused:     Physically Abused:     Sexually Abused:        Family History:  Family History   Problem Relation Age of Onset    Heart Disease Mother     Kidney Disease Father     Heart Disease Sister     Cancer Brother        Review of Systems:  Constitutional: Denies fevers, chills, or weight loss. HEENT: Denies visual changes or hearing loss. Heart: As per HPI. Lungs: Denies shortness of breath, cough, or wheezing. Gastrointestinal: Denies nausea, vomiting, constipation, or diarrhea. Genitourinary: dysuria or hematuria. Psychiatric: Patient denies anxiety or depression. Neurologic: Patient denies weakness of the extremities, dizziness, or headaches. All other ROS checked and found to be negative. Objective:  Vitals BP (!) 120/50 (Site: Right Upper Arm, Position: Sitting, Cuff Size: Medium Adult)   Pulse 60   Temp 97.7 °F (36.5 °C)   Ht 5' 1\" (1.549 m)   Wt 124 lb (56.2 kg)   BMI 23.43 kg/m²   General Appearance: Pleasant 80y.o. year old female who appears stated age. Communicates well, no acute distress. HEENT: Head is normocephalic, atraumatic. EOMs intact, PERRL. Trachea midline. Lungs: Normal respiratory rate and normal effort. She is not in respiratory distress. Breath sounds diminished at left base, otherwise clear to auscultation. No wheezes. Heart: Normal rate. Regular rhythm. S1 normal and S2 normal. No murmur. Chest: Symmetric chest wall expansion. Extremities: Normal range of motion. Neurological: Patient is alert and oriented to person, place and time. Skin: Warm and dry. Abdomen: Abdomen is soft and non-distended. Bowel sounds are normal.    Pulses: Distal pulses are intact. Skin: Warm and dry without lesions. Groins: bilateral groin access sites soft, non tender without erythema, ecchymosis, mass, hematoma or drainage      Assessment:   1. Severe, symptomatic AS s/p TAVR with #26 Evolut PRO+ - doing well  2. HFpEF - NYHA class II currently  3. AFib - on apixaban  4. HTN - controlled  5. Bilateral pleural effusions - s/p   6. Severe COPD - home oxygen  7. Moderate-severe pHTN  8. Severe kyphosis  9. Chronic immunosuppression      Plan:   1.  Follow up for 30 day

## 2021-06-08 NOTE — PATIENT INSTRUCTIONS
Follow up in structural heart clinic on 6/22/2021 at 12:30 pm for echocardiogram    Call for follow up with Dr. Taryn Paredes (pulmonologist) and Dr. Melvina Banegas (cardiologist)    Larissa Connolly to resume PT and to leave bandaids off of groin access sites    Call sooner if concerns arise in the meantime

## 2021-06-09 NOTE — TELEPHONE ENCOUNTER
Nurse at [facility] LUK[facility] is asking if patient still needs to be weighed daily, she is taking lasix 40mg daily and her weight for the past week is as follows:  124.4   125.8  125.2  126.2  122.2  123.8  124.4  125.4  124.4  125.0  124.2

## 2021-06-22 NOTE — PATIENT INSTRUCTIONS
Follow up in structural heart clinic on 5/17/2022 at 12:30pm for echocardiogram    Follow up with Anthony Leija and Jeanmarie Bruno as scheduled    Reminder: antibiotic required prior to dental appointment     Call sooner if concerns arise in the meantime [phone]

## 2021-06-22 NOTE — PROGRESS NOTES
Structural Heart Clinic Note      Patient name: Valeria Do    Reason for visit: TAVR follow up     Primary Care Physician: Tashia Chakraborty MD    Date of service: 6/22/2021    Chief Complaint: TAVR follow up - 30 day    HPI: Maulik Sanders presents for follow up s/p TAVR on 5/19/2021. She is doing well overall, but still notes only minimal improvement in her dyspnea since the procedure. She denies chest pain, orthopnea, PND, LE edema, palpitations or syncope. She remains on 3L oxygen via nasal canula, which is her baseline requirement. She has resumed physical therapy since last visit, but still feels weak and deconditioned. She has not followed up with Dr. Derik Lester yet. Allergies:    Allergies   Allergen Reactions    Cardizem [Diltiazem Hcl] Rash    Nsaids Other (See Comments)     Kidney insufficiency    Tape [Adhesive Tape] Rash       Home medications:    Current Outpatient Medications   Medication Sig Dispense Refill    spironolactone (ALDACTONE) 25 MG tablet TAKE 1 TABLET BY MOUTH DAILY 30 tablet 11    magnesium hydroxide (MILK OF MAGNESIA) 400 MG/5ML suspension Take 30 mLs by mouth Every 3rd day      OXYGEN Inhale 3 L into the lungs continuous      polyethylene glycol (GLYCOLAX) 17 GM/SCOOP powder Take 17 g by mouth daily      leflunomide (ARAVA) 20 MG tablet TAKE 1 TABLET BY MOUTH DAILY 30 tablet 3    acetaminophen (MAPAP ARTHRITIS PAIN) 650 MG extended release tablet TAKE 2 TABLETS BY MOUTH EVERY EIGHT HOURS. 180 tablet 3    furosemide (LASIX) 40 MG tablet Take 1 tablet by mouth daily 30 tablet 5    apixaban (ELIQUIS) 2.5 MG TABS tablet Take 1 tablet by mouth 2 times daily 60 tablet 3    metoprolol tartrate (LOPRESSOR) 25 MG tablet Take 0.5 tablets by mouth 2 times daily 30 tablet 5    aspirin 81 MG EC tablet Take 1 tablet by mouth daily 30 tablet 3    melatonin 1 MG tablet Take 1 mg by mouth nightly as needed for Sleep      digoxin (LANOXIN) 125 MCG tablet Take 1 tablet by mouth daily 30 tablet 5    senna (SENOKOT) 8.6 MG tablet One at bedtime 30 tablet 11    isosorbide mononitrate (IMDUR) 30 MG extended release tablet TAKE 1 TABLET BY MOUTH DAILY 30 tablet 11    fluticasone-vilanterol (BREO ELLIPTA) 100-25 MCG/INH AEPB inhaler Inhale into the lungs daily      Multiple Vitamins-Minerals (OCUVITE ADULT 50+) CAPS Take 1 capsule by mouth daily 30 capsule 11    predniSONE (DANNA) 5 MG TBEC One pill daily at bedtime with food 30 tablet 5    loratadine (CLARITIN) 10 MG tablet One daily at bedtime 30 tablet 11    calcium carbonate (CALCIUM 600) 600 MG TABS tablet Take 1 tablet by mouth 2 times daily 30 tablet 11    vitamin D (CHOLECALCIFEROL) 50 MCG (2000 UT) TABS tablet Take 1 tablet by mouth daily 30 tablet 11    Flaxseed, Linseed, (FLAXSEED OIL PO) Take 1,000 mg by mouth daily       zoledronic acid (RECLAST) 5 MG/100ML SOLN Infuse 5 mg intravenously once Yearly      albuterol (PROVENTIL) (2.5 MG/3ML) 0.083% nebulizer solution Take 2.5 mg by nebulization every 6 hours as needed for Wheezing (Patient not taking: Reported on 6/22/2021)      Wheat Dextrin (BENEFIBER) POWD One-half teaspoon with breakfast daily (Patient not taking: Reported on 6/22/2021) 1 Can 11     No current facility-administered medications for this visit.      Facility-Administered Medications Ordered in Other Visits   Medication Dose Route Frequency Provider Last Rate Last Admin    perflutren lipid microspheres (DEFINITY) injection 1.65 mg  1.5 mL Intravenous ONCE PRN DENNIS Nice           Past Medical History:  Past Medical History:   Diagnosis Date    Arthritis     Asthma     Calf DVT (deep venous thrombosis) ([facility]) rt leg    1998    COPD (chronic obstructive pulmonary disease) ([facility])     Hemorrhoids, internal     Hx of blood clots     Hypertension     Polymyalgia ([facility])     PONV (postoperative nausea and vomiting)     Renal insufficiency     Rosacea keratitis     Shingles     Sleep apnea 05/01/2014 Communication with Friends and Family:     Frequency of Social Gatherings with Friends and Family:     Attends Pentecostal Services:     Active Member of Clubs or Organizations:     Attends Club or Organization Meetings:     Marital Status:    Intimate Partner Violence:     Fear of Current or Ex-Partner:     Emotionally Abused:     Physically Abused:     Sexually Abused:        Family History:  Family History   Problem Relation Age of Onset    Heart Disease Mother     Kidney Disease Father     Heart Disease Sister     Cancer Brother        Review of Systems:  Constitutional: Denies fevers, chills, or weight loss. HEENT: Denies visual changes or hearing loss. Heart: As per HPI. Lungs: Denies shortness of breath, cough, or wheezing. Gastrointestinal: Denies nausea, vomiting, constipation, or diarrhea. Genitourinary: dysuria or hematuria. Psychiatric: Patient denies anxiety or depression. Neurologic: Patient denies weakness of the extremities, dizziness, or headaches. All other ROS checked and found to be negative. Objective:  Vitals /60 (Site: Right Upper Arm, Position: Sitting, Cuff Size: Medium Adult)   Pulse 94   Temp 98.4 °F (36.9 °C) (Oral)   Ht 5' 1\" (1.549 m)   Wt 127 lb (57.6 kg)   BMI 24.00 kg/m²   General Appearance: Pleasant 80y.o. year old female who appears stated age. Communicates well, no acute distress. HEENT: Head is normocephalic, atraumatic. EOMs intact, PERRL. Trachea midline. Lungs: Normal respiratory rate and normal effort. She is not in respiratory distress. Breath sounds diminished diffusely, but clear to auscultation. No wheezes. Heart: Normal rate. Regular rhythm. S1 normal and S2 normal. No murmur. Chest: Symmetric chest wall expansion. Extremities: Normal range of motion. Neurological: Patient is alert and oriented to person, place and time. Skin: Warm and dry. Abdomen: Abdomen is soft and non-distended.  Bowel sounds are normal. Pulses: Distal pulses are intact. Skin: Warm and dry without lesions. Assessment:   1. Severe, symptomatic AS s/p TAVR with #26 Evolut PRO+ - doing well. Mean gradient 8 mmHg by echo today; full report to follow  2. HFpEF - NYHA class II currently  3. AFib - on apixaban  4. HTN - controlled  5. Bilateral pleural effusions - s/p thoracentesis    6. Severe COPD - home oxygen  7. Moderate-severe pHTN  8. Severe kyphosis  9. Chronic immunosuppression      Plan:   1. Follow up for one year echo  2. Follow up with PCP, pulmonary and cardiology as scheduled  3. Ok to continue PT from TAVR standpoint  4. Call sooner if concerns arise in the meantime  5.  SBE prophylaxis       Electronically signed by Marly Pastor PA-C on 6/22/2021 at 4:44 PM

## 2021-06-24 NOTE — PROGRESS NOTES
CHIEF COMPLAINT: CHF/SOB/COPD/HTN/PVD/AS    HISTORY OF PRESENT ILLNESS: Patient is a 80 y.o. female seen at the request of Sam Degroot MD.      Patient presenting in follow up. Recently had TAVR. On oxygen. Baseline SOB.      Past Medical History:   Diagnosis Date    Arthritis     Asthma     Calf DVT (deep venous thrombosis) ([facility]) rt leg    1998    COPD (chronic obstructive pulmonary disease) ([facility])     Hemorrhoids, internal     Hx of blood clots     Hypertension     Polymyalgia ([facility])     PONV (postoperative nausea and vomiting)     Renal insufficiency     Rosacea keratitis     Shingles     Sleep apnea 05/01/2014    bi-pap @ night    UTI (lower urinary tract infection) 06/12/2013    Volume overload        Patient Active Problem List   Diagnosis    Cataract, nuclear    Senile osteoporosis    Closed fracture of distal end of radius    Left wrist pain    Post-traumatic osteoarthritis of both hips    DDD (degenerative disc disease), lumbar    History of compression fracture of spine    Decreased bone density    Thrombus- left lower lobe    Pleural effusion    COPD (chronic obstructive pulmonary disease) ([facility])    Critical aortic valve stenosis    Persistent atrial fibrillation ([facility])    Non-rheumatic mitral valve disease    Moderate to severe pulmonary hypertension ([facility])    Severe aortic stenosis       Allergies   Allergen Reactions    Cardizem [Diltiazem Hcl] Rash    Nsaids Other (See Comments)     Kidney insufficiency    Tape [Adhesive Tape] Rash       Current Outpatient Medications   Medication Sig Dispense Refill    spironolactone (ALDACTONE) 25 MG tablet TAKE 1 TABLET BY MOUTH DAILY 30 tablet 11    magnesium hydroxide (MILK OF MAGNESIA) 400 MG/5ML suspension Take 30 mLs by mouth Every 3rd day      OXYGEN Inhale 3 L into the lungs continuous      polyethylene glycol (GLYCOLAX) 17 GM/SCOOP powder Take 17 g by mouth daily      leflunomide (ARAVA) 20 MG tablet TAKE 1 eMERGENCY dEPARTMENT eNCOUnter      CHIEF COMPLAINT    Chief Complaint   Patient presents with   • Eye Pain       HPI    Piotr Menchaca is a 62 year old male who presents with bilateral eye redness and occasional pain ongoing for last 4 days. Patient states she's been waking up daily with yellowish discharge. He denies any change in his vision. States he usually wears glasses and does not have with him today. Patient denies any injury to bilateral eyes. He has taken no medication for symptoms. Denies any known sick contacts or anyone with pinkeye. He has no other concerns or complaints.    ALLERGIES    ALLERGIES:  No Known Allergies    CURRENT MEDICATIONS    No current facility-administered medications for this encounter.      Current Outpatient Prescriptions   Medication Sig Dispense Refill   • amoxicillin-clavulanate (AUGMENTIN) 875-125 MG per tablet Take 1 tablet by mouth every 12 hours. 6 tablet 0   • guaiFENesin-codeine (GUAIFENESIN AC) 100-10 MG/5ML liquid Take 5 mLs by mouth every 4 hours as needed for Cough. 120 mL 0   • warfarin (COUMADIN) 5 MG tablet Take 1 tablet by mouth daily. 30 tablet 0   • enoxaparin (LOVENOX) 80 MG/0.8ML injectable solution Expel 0.1 mL from syringe and then inject 0.7 mLs into the skin every 12 hours. 5.6 mL 1   • acetaminophen (TYLENOL) 325 MG tablet Take 2 tablets by mouth every 8 hours. 30 tablet 0       PAST MEDICAL HISTORY    Past Medical History:   Diagnosis Date   • Blood clot associated with vein wall inflammation    • Fracture    • Negative History of CA, HTN, DM, CAD, CVA, DVT, Asthma    • Pneumonia        SURGICAL HISTORY    Past Surgical History:   Procedure Laterality Date   • Eye surgery     • Fracture surgery     • Hernia repair     • Past surgical history      laser surgery right eye, left index finger, orbit fx       SOCIAL HISTORY    Social History     Social History   • Marital status: Single     Spouse name: N/A   • Number of children: 1   • Years of education:  N/A     Occupational History   • unemployed      Social History Main Topics   • Smoking status: Current Every Day Smoker     Packs/day: 0.50     Years: 40.00     Types: Cigarettes   • Smokeless tobacco: Never Used   • Alcohol use 1.2 oz/week     2 Cans of beer per week      Comment: Last drank today - 3 shots   • Drug use: Yes     Types: Cannabinols, Cocaine, Marijuana      Comment: used today 7/30/18   • Sexual activity: Not on file     Other Topics Concern   • Not on file     Social History Narrative   • No narrative on file       FAMILY HISTORY    Family History   Problem Relation Age of Onset   • NEGATIVE FAMILY HX OF Other    • Patient is unaware of any medical problems Mother    • Patient is unaware of any medical problems Sister    • Heart disease Brother    • Patient is unaware of any medical problems Son    • Patient is unaware of any medical problems Brother    • Patient is unaware of any medical problems Brother    • Patient is unaware of any medical problems Brother    • Patient is unaware of any medical problems Brother    • Patient is unaware of any medical problems Sister        REVIEW OF SYSTEMS      Constitutional:  Denies fever or chills.   Eyes:  See HPI.   HENT:  Denies nasal congestion or sore throat.   Respiratory:  Denies cough or shortness of breath.   Cardiovascular:  Denies chest pain or edema.   Neurologic:  Denies headache, focal weakness or sensory changes.     PHYSICAL EXAM    ED Triage Vitals [08/07/18 0437]   /86   Pulse 87   Resp 16   Temp 97.6 °F (36.4 °C)   SpO2 96 %     Constitutional:  Well developed, well nourished, no acute distress, non-toxic appearance.  Eyes:  PERRLA intact, EOM intact, bilateral conjunctiva with erythema. There is no dendritic lesions, ulcerations or abrasions noted with stain. Patient's eye pressures are Right (17,14,17).  Left (11, 14, 14).  HENT:  Oropharynx is moist without erythema or exudate. Uvula is midline.  Respiratory:  No respiratory  TABLET BY MOUTH DAILY 30 tablet 3    acetaminophen (MAPAP ARTHRITIS PAIN) 650 MG extended release tablet TAKE 2 TABLETS BY MOUTH EVERY EIGHT HOURS. 180 tablet 3    furosemide (LASIX) 40 MG tablet Take 1 tablet by mouth daily 30 tablet 5    apixaban (ELIQUIS) 2.5 MG TABS tablet Take 1 tablet by mouth 2 times daily 60 tablet 3    metoprolol tartrate (LOPRESSOR) 25 MG tablet Take 0.5 tablets by mouth 2 times daily 30 tablet 5    aspirin 81 MG EC tablet Take 1 tablet by mouth daily 30 tablet 3    melatonin 1 MG tablet Take 1 mg by mouth nightly as needed for Sleep      digoxin (LANOXIN) 125 MCG tablet Take 1 tablet by mouth daily 30 tablet 5    senna (SENOKOT) 8.6 MG tablet One at bedtime 30 tablet 11    isosorbide mononitrate (IMDUR) 30 MG extended release tablet TAKE 1 TABLET BY MOUTH DAILY 30 tablet 11    fluticasone-vilanterol (BREO ELLIPTA) 100-25 MCG/INH AEPB inhaler Inhale into the lungs daily      Multiple Vitamins-Minerals (OCUVITE ADULT 50+) CAPS Take 1 capsule by mouth daily 30 capsule 11    predniSONE (DANNA) 5 MG TBEC One pill daily at bedtime with food 30 tablet 5    loratadine (CLARITIN) 10 MG tablet One daily at bedtime 30 tablet 11    calcium carbonate (CALCIUM 600) 600 MG TABS tablet Take 1 tablet by mouth 2 times daily 30 tablet 11    vitamin D (CHOLECALCIFEROL) 50 MCG (2000 UT) TABS tablet Take 1 tablet by mouth daily 30 tablet 11    Wheat Dextrin (BENEFIBER) POWD One-half teaspoon with breakfast daily 1 Can 11    Flaxseed, Linseed, (FLAXSEED OIL PO) Take 1,000 mg by mouth daily       zoledronic acid (RECLAST) 5 MG/100ML SOLN Infuse 5 mg intravenously once Yearly      albuterol (PROVENTIL) (2.5 MG/3ML) 0.083% nebulizer solution Take 2.5 mg by nebulization every 6 hours as needed for Wheezing (Patient not taking: Reported on 6/24/2021)       No current facility-administered medications for this visit.        Social History     Socioeconomic History    Marital status: Single distress, normal breath sounds. No rales. No wheezing.   Cardiovascular:  Normal rate, normal rhythm. No murmurs, gallops, or rubs..  Integument:  Well hydrated, no rash.   Neurologic:  Alert & oriented     ED COURSE & MEDICAL DECISION MAKING    62-year-old male presents with bilateral eye redness with some pain and blurred vision. There is no evidence of lesions on the corneas with stain. Patient's pressures are equal bilateral. He refuses to do a visual acuity due to blurred vision. Patient does have significant erythema to bilateral eyes with some yellow crusting. Possible conjunctivitis. Will be on erythromycin.    1018 spoke with Dr. Dawkins, ophthalmologist, he recommends patient be seen this afternoon.      I discussed the possible diagnoses with the patient as well as the warning signs and symptoms. The patient understands that this is a provisional diagnosis based on current symptoms which can and do change. If any new symptoms occur or if symptoms worsen, the patient will seek immediate attention for re-evaluation. Patient was also provided with discharge instructions and follow up information. Patient understands and agrees to treatment plan.        FINAL IMPRESSION    Bilateral eye erythema       GINA Bhandari  08/07/18 1019     Exam   /64   Pulse 69   Resp 16   Ht 5' 1\" (1.549 m)   Wt 126 lb 12.8 oz (57.5 kg)   BMI 23.96 kg/m²   Constitutional: Oriented to person, place, and time. Well-developed and well-nourished. No distress. Head: Normocephalic and atraumatic. Eyes: EOM are normal. Pupils are equal, round, and reactive to light. Neck: Normal range of motion. Neck supple. No hepatojugular reflux and no JVD present. Carotid bruit is not present. No tracheal deviation present. No thyromegaly present. Cardiovascular: Normal rate, regular rhythm, VANDA 2/6. Pulmonary/Chest: Effort normal and breath sounds normal. No respiratory distress. No wheezes. No rales. No tenderness. Abdominal: Soft. Bowel sounds are normal. No distension and no mass. No tenderness. No rebound and no guarding. Musculoskeletal: Normal range of motion. No edema and no tenderness. Lymphadenopathy:   No cervical adenopathy. No groin adenopathy. Neurological: Alert and oriented to person, place, and time. Skin: Skin is warm and dry. No rash noted. Not diaphoretic. No erythema. Psychiatric: Normal mood and affect. Behavior is normal.     EKG personally reviewed 06/24/21:  Afib. Echo Summary 3/25/2021:   Ejection fraction is visually estimated at 70%.   No regional wall motion abnormalities seen.   Mild left ventricular concentric hypertrophy noted.   Normal right ventricle size with reduced function. TAPSE is 1.4 cm.   Left atrial volume index of 58 ml per meters squared BSA.   Individual aortic valve leaflets are not clearly visualized.   Severe aortic stenosis is present. The peak velocity is 4.8 m/s. The aortic valve area is 0.5 cm2 with a maximum gradient of 92 mmHg and a mean gradient of 67 mmHg.   Severe mitral annular calcification.   Moderate mitral stenosis is present. Mean transmitral gradient 7.8 mmHg.   Mild mitral regurgitation is present.   Moderate tricuspid regurgitation.   Pulmonary hypertension is moderate.  RVSP is 55 mmHg.    Echo Summary 6/22/2021:   Normal left ventricular systolic function. Ejection fraction is visually estimated at > 60%. Normal right ventricular size and function (TAPSE 2.1 cm). Indeterminate diastolic function. Severely dilated left atrium by volume index. Severe mitral annular calcification. Mild mitral regurgitation. Moderate mitral stenosis. History of TAVR (26 mm Evolut PRO+). Trace paravalvular aortic regurgitation. AV peak velocity 2.0 m/s. AV mean gradient 8 mmHg. Aortic valve area 1.9 cm2. Mild tricuspid regurgitation. PASP is estimated at 87 mmHg. Severe pulmonary hypertension. ASSESSMENT AND PLAN:  Patient Active Problem List   Diagnosis    Cataract, nuclear    Senile osteoporosis    Closed fracture of distal end of radius    Left wrist pain    Post-traumatic osteoarthritis of both hips    DDD (degenerative disc disease), lumbar    History of compression fracture of spine    Decreased bone density    Thrombus- left lower lobe    Pleural effusion    COPD (chronic obstructive pulmonary disease) ([facility])    Critical aortic valve stenosis    Persistent atrial fibrillation ([facility])    Non-rheumatic mitral valve disease    Moderate to severe pulmonary hypertension ([facility])    Severe aortic stenosis     1. VHD/Post TAVR: Post TAVR with 26 mm Evolut Pro+ on 5/19/2021. Stable echo 6/22/2021.    2. Atrial fibrillation:     Rate control.     Eliquis (dose adjusted due to age/weight less than 60 kg) from an afib standpoint.       3. Acute diastolic CHF: Echo as above.     Continue lasix/aldactone/BB.     4. PE: CTA chest suggest PE.     Eliquis. 5. COPD: On home oxygen.      Pepe Lopez D.O.   Cardiologist  Cardiology, [address] [facility]

## 2021-07-07 NOTE — TELEPHONE ENCOUNTER
Call returned to health care center at [facility] re: needed follow up for Omar Cassidy. Spoke with staff. Advised Dr. Bernabe Florian saw pt as consult for procedure in May to remove fluid from her lungs and that Hospital follow up in our office with Dr. Bernabe Florian is not needed unless pt has issues with her breathing. No issues noted and advised to have primary care doctor consult Dr. Pb Kang if needs in future.

## 2022-01-18 ENCOUNTER — TELEPHONE (OUTPATIENT)
Dept: FAMILY MEDICINE CLINIC | Age: 87
End: 2022-01-18

## 2022-01-18 NOTE — TELEPHONE ENCOUNTER
----- Message from Phyllis Nadiya sent at 2022  9:48 AM EST -----  Subject: Message to Provider    QUESTIONS  Information for Provider? Jay Ferro with [employer] stated he faxed over   paperwork to be signed by physician for  pt. [phone] Ext [phone] is   call back number. ---------------------------------------------------------------------------  --------------  Marita MARRERO  What is the best way for the office to contact you? OK to leave message on   voicemail  Preferred Call Back Phone Number? [phone]  ---------------------------------------------------------------------------  --------------  SCRIPT ANSWERS  Relationship to Patient? Third Party  Representative Name?  Jay Ferro with [employer]

## (undated) DEVICE — LABEL MED CARD SURG 4 IN PANEL STRL

## (undated) DEVICE — CATH ANGIO 5FR .045IN AL1 100CM EXPO

## (undated) DEVICE — SYRINGE MED 50ML LUERLOCK TIP

## (undated) DEVICE — LOOP VES W25MM THK1MM MAXI RED SIL FLD REPELLENT 100 PER

## (undated) DEVICE — GLOVE ORANGE PI 8   MSG9080

## (undated) DEVICE — PATIENT RETURN ELECTRODE, SINGLE-USE, CONTACT QUALITY MONITORING, ADULT, WITH 9FT CORD, FOR PATIENTS WEIGING OVER 33LBS. (15KG): Brand: MEGADYNE

## (undated) DEVICE — PICO 7 10CM X 20CM: Brand: PICO™ 7

## (undated) DEVICE — GUIDEWIRE ANGIO L150CM OD0.035IN STR FIX PTFE SLD SUPP

## (undated) DEVICE — SET INTRO SHTH 5FR L45CM GRY INTEGR SIDE PRT HAEMOSTASIS

## (undated) DEVICE — COVER PRB W14XL147CM TELESCOPICALLY FLD EXT LEN CIV-FLEX

## (undated) DEVICE — SURGICAL PROCEDURE PACK BASIC

## (undated) DEVICE — DRAPE EQUIP BANDED BG 36X28 IN W/ROUNDED CORNER SNAPKOVER

## (undated) DEVICE — HOLDER NDL WEBST SNAG FREE

## (undated) DEVICE — INTENDED FOR TISSUE SEPARATION, AND OTHER PROCEDURES THAT REQUIRE A SHARP SURGICAL BLADE TO PUNCTURE OR CUT.: Brand: BARD-PARKER ® STAINLESS STEEL BLADES

## (undated) DEVICE — CLOTH SURG PREP PREOPERATIVE CHLORHEXIDINE GLUC 2% READYPREP

## (undated) DEVICE — INTRODUCER SHTH 6FR L12CM DIA0.038IN HEMSTAS CLOSE TOL

## (undated) DEVICE — COVER,TABLE,60X90,STERILE: Brand: MEDLINE

## (undated) DEVICE — 3M™ IOBAN™ 2 ANTIMICROBIAL INCISE DRAPE 6650EZ: Brand: IOBAN™ 2

## (undated) DEVICE — GLOVE ORANGE PI 7 1/2   MSG9075

## (undated) DEVICE — GOWN,SIRUS,FABRNF,XL,20/CS: Brand: MEDLINE

## (undated) DEVICE — TAPE ADH W2INXL10YD PLAS TRNSPAR H2O RESIST HYPOALRG CURAD

## (undated) DEVICE — ALCOHOL 70% RUBBING 16OZ

## (undated) DEVICE — MEDIA CONTRAST RX ISOVUE-300 61% 30ML VIALS

## (undated) DEVICE — DRESSING FOAM W22XL25CM FILVE LAYR FOAM DP DEF SAFETAC

## (undated) DEVICE — SOLUTION IV IRRIG WATER 1000ML POUR BRL 2F7114

## (undated) DEVICE — LARGE BORE STOPCOCK WITH ROTATING MALE LUER LOCK

## (undated) DEVICE — GUIDEWIRE VASC L145CM 0.035IN J TIP L3MM PTFE FIX COR NAMIC

## (undated) DEVICE — BOWL MED L 32OZ PLAS W/ MOLD GRAD EZ OPN PEEL PCH

## (undated) DEVICE — DRAPE SHEET: Brand: UNBRANDED

## (undated) DEVICE — DRAPE, MULTI FENESTRATED, HYBRID OR, STE: Brand: MEDLINE

## (undated) DEVICE — ANGIOPLASTY ADD-ON PACK: Brand: MEDLINE INDUSTRIES, INC.

## (undated) DEVICE — SOLUTION IV 1000ML 0.9% SOD CHL PH 5 INJ USP VIAFLX PLAS

## (undated) DEVICE — SYRINGE 20ML LL S/C 50

## (undated) DEVICE — SYRINGE MED 10ML LUERLOCK TIP W/O SFTY DISP

## (undated) DEVICE — BLADE CLIPPER GEN PURP NS

## (undated) DEVICE — DRESSING TRNSPAR W4XL55IN ACRYL SUP FLM W ADH WTRPRF OPSITE

## (undated) DEVICE — CATHETER ANGIO PIG 0.045 IN 5 FRX110 CM VENTRICULAR EXPO

## (undated) DEVICE — APPLICATOR MEDICATED 26 CC SOLUTION HI LT ORNG CHLORAPREP

## (undated) DEVICE — TUBING PRSS MON L12IN PVC RIG NONEXPANDING M TO FEM CONN

## (undated) DEVICE — MEDI-TRACE CADENCE ADULT, PRE-CONNECT  DEFIBRILLATION ELECTRODE (10 PR/PK) - PHYSIO-CONTROL: Brand: MEDI-TRACE CADENCE

## (undated) DEVICE — KIT HND CTRL 3 W STPCOCK ROT END 54IN PREM HI PRSS TBNG AT

## (undated) DEVICE — NEEDLE SPNL 20GA L3.5IN YEL HUB S STL REG WALL FIT STYL W/

## (undated) DEVICE — PERCUTANEOUS ENTRY THINWALL NEEDLE  ONE-PART: Brand: COOK

## (undated) DEVICE — ANGIO-SEAL VIP VASCULAR CLOSURE DEVICE: Brand: ANGIO-SEAL

## (undated) DEVICE — STAPLER SKIN L39MM DIA0.53MM CRWN 5.7MM S STL FIX HD PROX

## (undated) DEVICE — KIT MED IMAG CNTRST AGNT W/ IOPAMIDOL REUSE

## (undated) DEVICE — GUIDEWIRE VASC L260CM 0.035IN J TIP L3MM PTFE FIX COR NAMIC

## (undated) DEVICE — CATHETER DIAG AD 5FR L100CM COR GRY HYDRPHLC NYL MPA 2 W/ 2

## (undated) DEVICE — Z INACTIVE USE 2641837 CLIP LIG M BLU TI HRT SHP WIRE HORZ 600 PER BX

## (undated) DEVICE — INTRO SHEATH W/6.0FRX10CMX.0385IN

## (undated) DEVICE — GLOVE SURG SZ 6 THK91MIL LTX FREE SYN POLYISOPRENE ANTI

## (undated) DEVICE — GOWN,SIRUS,FABRNF,L,20/CS: Brand: MEDLINE

## (undated) DEVICE — AMPLATZ EXTRA STIFF WIRE GUIDE: Brand: AMPLATZ

## (undated) DEVICE — KIT MFLD ISOLATN NACL CNTRST PRT TBNG SPIK W/ PRSS TRNSDUC

## (undated) DEVICE — CATHETER DIAG 5FR L110CM ID0.045IN VENT VASC PGTL 145 EXPO

## (undated) DEVICE — ADHESIVE SKIN CLOSURE TOP 36 CC HI VISC DERMBND MINI

## (undated) DEVICE — GLOVE ORANGE PI 7   MSG9070

## (undated) DEVICE — DRAPE,REIN 53X77,STERILE: Brand: MEDLINE

## (undated) DEVICE — SET CARDIAC II

## (undated) DEVICE — SET CARDIAC I

## (undated) DEVICE — PACK SURG CARDIAC CATH DYNJ38860] MEDLINE INDUSTRIES INC]

## (undated) DEVICE — AGENT HEMSTAT W4XL8IN OXIDIZED REGENERATED CELOS ABSRB

## (undated) DEVICE — GLOVE SURG SZ 65 THK91MIL LTX FREE SYN POLYISOPRENE

## (undated) DEVICE — SET TRNQT W/ STD 7IN 12FR 5 TB 1 SNR DLP

## (undated) DEVICE — TOWEL,OR,DSP,ST,BLUE,STD,6/PK,12PK/CS: Brand: MEDLINE

## (undated) DEVICE — GUIDEWIRE VASC L260CM DIA0.035IN TAPR L11CM FLPY TIP L4CM

## (undated) DEVICE — GLOVE ORTHO 8   MSG9480

## (undated) DEVICE — PADDLE INTERN DEFIB CHILD